# Patient Record
Sex: FEMALE | Race: BLACK OR AFRICAN AMERICAN | NOT HISPANIC OR LATINO | Employment: PART TIME | URBAN - METROPOLITAN AREA
[De-identification: names, ages, dates, MRNs, and addresses within clinical notes are randomized per-mention and may not be internally consistent; named-entity substitution may affect disease eponyms.]

---

## 2017-02-03 ENCOUNTER — HISTORICAL (OUTPATIENT)
Dept: INTERNAL MEDICINE | Facility: CLINIC | Age: 57
End: 2017-02-03

## 2017-02-13 ENCOUNTER — HISTORICAL (OUTPATIENT)
Dept: CARDIOLOGY | Facility: HOSPITAL | Age: 57
End: 2017-02-13

## 2017-02-14 ENCOUNTER — HISTORICAL (OUTPATIENT)
Dept: RADIOLOGY | Facility: HOSPITAL | Age: 57
End: 2017-02-14

## 2017-04-17 ENCOUNTER — HISTORICAL (OUTPATIENT)
Dept: INTERNAL MEDICINE | Facility: CLINIC | Age: 57
End: 2017-04-17

## 2017-12-01 ENCOUNTER — HISTORICAL (OUTPATIENT)
Dept: LAB | Facility: HOSPITAL | Age: 57
End: 2017-12-01

## 2017-12-01 LAB
ABS NEUT (OLG): 1.43 X10(3)/MCL (ref 2.1–9.2)
ALBUMIN SERPL-MCNC: 4 GM/DL (ref 3.4–5)
ALBUMIN/GLOB SERPL: 1 RATIO (ref 1–2)
ALP SERPL-CCNC: 77 UNIT/L (ref 45–117)
ALT SERPL-CCNC: 32 UNIT/L (ref 12–78)
APPEARANCE, UA: CLEAR
AST SERPL-CCNC: 31 UNIT/L (ref 15–37)
BACTERIA #/AREA URNS AUTO: ABNORMAL /[HPF]
BASOPHILS # BLD AUTO: 0.02 X10(3)/MCL
BASOPHILS NFR BLD AUTO: 1 % (ref 0–1)
BILIRUB SERPL-MCNC: 0.3 MG/DL (ref 0.2–1)
BILIRUB UR QL STRIP: NEGATIVE
BILIRUBIN DIRECT+TOT PNL SERPL-MCNC: 0.1 MG/DL
BILIRUBIN DIRECT+TOT PNL SERPL-MCNC: 0.2 MG/DL
BUN SERPL-MCNC: 18 MG/DL (ref 7–18)
CALCIUM SERPL-MCNC: 8.7 MG/DL (ref 8.5–10.1)
CHLORIDE SERPL-SCNC: 106 MMOL/L (ref 98–107)
CHOLEST SERPL-MCNC: 216 MG/DL
CHOLEST/HDLC SERPL: 2 {RATIO} (ref 0–4.4)
CO2 SERPL-SCNC: 26 MMOL/L (ref 21–32)
COLOR UR: ABNORMAL
CREAT SERPL-MCNC: 0.6 MG/DL (ref 0.6–1.3)
EOSINOPHIL # BLD AUTO: 0.1 X10(3)/MCL
EOSINOPHIL NFR BLD AUTO: 3 % (ref 0–5)
ERYTHROCYTE [DISTWIDTH] IN BLOOD BY AUTOMATED COUNT: 13.4 % (ref 11.5–14.5)
EST. AVERAGE GLUCOSE BLD GHB EST-MCNC: 114 MG/DL
GLOBULIN SER-MCNC: 3.8 GM/ML (ref 2.3–3.5)
GLUCOSE (UA): NORMAL
GLUCOSE SERPL-MCNC: 76 MG/DL (ref 74–106)
HAV IGM SERPL QL IA: NONREACTIVE
HBA1C MFR BLD: 5.6 % (ref 4.2–6.3)
HBV CORE IGM SERPL QL IA: NONREACTIVE
HBV SURFACE AG SERPL QL IA: NEGATIVE
HCT VFR BLD AUTO: 34.9 % (ref 35–46)
HCV AB SERPL QL IA: NONREACTIVE
HDLC SERPL-MCNC: 108 MG/DL
HGB BLD-MCNC: 11.4 GM/DL (ref 12–16)
HGB UR QL STRIP: 0.03 MG/DL
HIV 1+2 AB+HIV1 P24 AG SERPL QL IA: NONREACTIVE
HYALINE CASTS #/AREA URNS LPF: ABNORMAL /[LPF]
KETONES UR QL STRIP: NEGATIVE
LDLC SERPL CALC-MCNC: 102 MG/DL (ref 0–130)
LEUKOCYTE ESTERASE UR QL STRIP: NEGATIVE
LYMPHOCYTES # BLD AUTO: 1.04 X10(3)/MCL
LYMPHOCYTES NFR BLD AUTO: 36 % (ref 15–40)
MCH RBC QN AUTO: 29.3 PG (ref 26–34)
MCHC RBC AUTO-ENTMCNC: 32.7 GM/DL (ref 31–37)
MCV RBC AUTO: 89.7 FL (ref 80–100)
MONOCYTES # BLD AUTO: 0.34 X10(3)/MCL
MONOCYTES NFR BLD AUTO: 12 % (ref 4–12)
MUCOUS THREADS URNS QL MICRO: SLIGHT
NEUTROPHILS # BLD AUTO: 1.43 X10(3)/MCL
NEUTROPHILS NFR BLD AUTO: 49 X10(3)/MCL
NITRITE UR QL STRIP: NEGATIVE
PH UR STRIP: 6.5 [PH] (ref 4.5–8)
PLATELET # BLD AUTO: 227 X10(3)/MCL (ref 130–400)
PMV BLD AUTO: 9.4 FL (ref 7.4–10.4)
POTASSIUM SERPL-SCNC: 3.5 MMOL/L (ref 3.5–5.1)
PROT SERPL-MCNC: 7.8 GM/DL (ref 6.4–8.2)
PROT UR QL STRIP: NEGATIVE
RBC # BLD AUTO: 3.89 X10(6)/MCL (ref 4–5.2)
RBC #/AREA URNS AUTO: ABNORMAL /[HPF]
SODIUM SERPL-SCNC: 142 MMOL/L (ref 136–145)
SP GR UR STRIP: 1.02 (ref 1–1.03)
SQUAMOUS #/AREA URNS LPF: >100 /[LPF]
T4 FREE SERPL-MCNC: 0.88 NG/DL (ref 0.76–1.46)
TRIGL SERPL-MCNC: 28 MG/DL
TSH SERPL-ACNC: 3.88 MIU/L (ref 0.36–3.74)
UROBILINOGEN UR STRIP-ACNC: NORMAL MG/DL
VLDLC SERPL CALC-MCNC: 6 MG/DL
WBC # SPEC AUTO: 2.9 X10(3)/MCL (ref 4.5–11)
WBC #/AREA URNS AUTO: ABNORMAL /HPF

## 2017-12-05 ENCOUNTER — HISTORICAL (OUTPATIENT)
Dept: INTERNAL MEDICINE | Facility: CLINIC | Age: 57
End: 2017-12-05

## 2017-12-05 LAB
ABS NEUT (OLG): 1.94 X10(3)/MCL (ref 2.1–9.2)
BASOPHILS # BLD AUTO: 0.02 X10(3)/MCL
BASOPHILS NFR BLD AUTO: 1 % (ref 0–1)
EOSINOPHIL # BLD AUTO: 0.1 X10(3)/MCL
EOSINOPHIL NFR BLD AUTO: 3 % (ref 0–5)
ERYTHROCYTE [DISTWIDTH] IN BLOOD BY AUTOMATED COUNT: 13.6 % (ref 11.5–14.5)
ERYTHROCYTE [SEDIMENTATION RATE] IN BLOOD: 14 MM/HR (ref 0–20)
FERRITIN SERPL-MCNC: 108.3 NG/ML (ref 8–388)
HCT VFR BLD AUTO: 34.7 % (ref 35–46)
HGB BLD-MCNC: 11.2 GM/DL (ref 12–16)
IRON SATN MFR SERPL: 25.4 % (ref 15–50)
IRON SERPL-MCNC: 91 MCG/DL (ref 50–170)
LYMPHOCYTES # BLD AUTO: 0.81 X10(3)/MCL
LYMPHOCYTES NFR BLD AUTO: 26 % (ref 15–40)
MCH RBC QN AUTO: 29 PG (ref 26–34)
MCHC RBC AUTO-ENTMCNC: 32.3 GM/DL (ref 31–37)
MCV RBC AUTO: 89.9 FL (ref 80–100)
MONOCYTES # BLD AUTO: 0.26 X10(3)/MCL
MONOCYTES NFR BLD AUTO: 8 % (ref 4–12)
NEUTROPHILS # BLD AUTO: 1.94 X10(3)/MCL
NEUTROPHILS NFR BLD AUTO: 62 X10(3)/MCL
PLATELET # BLD AUTO: 195 X10(3)/MCL (ref 130–400)
PMV BLD AUTO: 9.2 FL (ref 7.4–10.4)
PROT SERPL-MCNC: 6.8 GM/DL
RBC # BLD AUTO: 3.86 X10(6)/MCL (ref 4–5.2)
TIBC SERPL-MCNC: 358 MCG/DL (ref 250–450)
TRANSFERRIN SERPL-MCNC: 235 MG/DL (ref 200–360)
WBC # SPEC AUTO: 3.1 X10(3)/MCL (ref 4.5–11)

## 2018-04-18 ENCOUNTER — HISTORICAL (OUTPATIENT)
Dept: RADIOLOGY | Facility: HOSPITAL | Age: 58
End: 2018-04-18

## 2018-05-23 ENCOUNTER — HISTORICAL (OUTPATIENT)
Dept: ADMINISTRATIVE | Facility: HOSPITAL | Age: 58
End: 2018-05-23

## 2018-05-23 LAB
ALBUMIN SERPL-MCNC: 3.8 GM/DL (ref 3.4–5)
ALBUMIN/GLOB SERPL: 1 RATIO (ref 1–2)
ALP SERPL-CCNC: 72 UNIT/L (ref 45–117)
ALT SERPL-CCNC: 35 UNIT/L (ref 12–78)
AST SERPL-CCNC: 40 UNIT/L (ref 15–37)
BILIRUB SERPL-MCNC: 0.4 MG/DL (ref 0.2–1)
BUN SERPL-MCNC: 15 MG/DL (ref 7–18)
CALCIUM SERPL-MCNC: 9 MG/DL (ref 8.5–10.1)
CHLORIDE SERPL-SCNC: 106 MMOL/L (ref 98–107)
CO2 SERPL-SCNC: 29 MMOL/L (ref 21–32)
CREAT SERPL-MCNC: 0.7 MG/DL (ref 0.6–1.3)
GLOBULIN SER-MCNC: 3.8 GM/ML (ref 2.3–3.5)
GLUCOSE SERPL-MCNC: 84 MG/DL (ref 74–106)
HIV 1+2 AB+HIV1 P24 AG SERPL QL IA: NONREACTIVE
POTASSIUM SERPL-SCNC: 5.2 MMOL/L (ref 3.5–5.1)
PROT SERPL-MCNC: 7.6 GM/DL (ref 6.4–8.2)
SODIUM SERPL-SCNC: 137 MMOL/L (ref 136–145)
TSH SERPL-ACNC: 2.7 MIU/L (ref 0.36–3.74)

## 2018-08-21 ENCOUNTER — HISTORICAL (OUTPATIENT)
Dept: RADIOLOGY | Facility: HOSPITAL | Age: 58
End: 2018-08-21

## 2018-08-21 LAB
BUN SERPL-MCNC: 17 MG/DL (ref 7–18)
CALCIUM SERPL-MCNC: 8.4 MG/DL (ref 8.5–10.1)
CHLORIDE SERPL-SCNC: 108 MMOL/L (ref 98–107)
CO2 SERPL-SCNC: 27 MMOL/L (ref 21–32)
CREAT SERPL-MCNC: 0.7 MG/DL (ref 0.6–1.3)
CREAT/UREA NIT SERPL: 24
GLUCOSE SERPL-MCNC: 78 MG/DL (ref 74–106)
POTASSIUM SERPL-SCNC: 5.1 MMOL/L (ref 3.5–5.1)
SODIUM SERPL-SCNC: 139 MMOL/L (ref 136–145)

## 2018-08-30 ENCOUNTER — HISTORICAL (OUTPATIENT)
Dept: RADIOLOGY | Facility: HOSPITAL | Age: 58
End: 2018-08-30

## 2019-02-26 ENCOUNTER — HISTORICAL (OUTPATIENT)
Dept: ADMINISTRATIVE | Facility: HOSPITAL | Age: 59
End: 2019-02-26

## 2019-02-26 LAB
ABS NEUT (OLG): 1.76 X10(3)/MCL (ref 2.1–9.2)
ALBUMIN SERPL-MCNC: 4 GM/DL (ref 3.4–5)
ALBUMIN/GLOB SERPL: 1 RATIO (ref 1.1–2)
ALP SERPL-CCNC: 76 UNIT/L (ref 45–117)
ALT SERPL-CCNC: 32 UNIT/L (ref 12–78)
APPEARANCE, UA: CLEAR
AST SERPL-CCNC: 32 UNIT/L (ref 15–37)
BACTERIA #/AREA URNS AUTO: ABNORMAL /[HPF]
BASOPHILS # BLD AUTO: 0.03 X10(3)/MCL
BASOPHILS NFR BLD AUTO: 1 %
BILIRUB SERPL-MCNC: 0.3 MG/DL (ref 0.2–1)
BILIRUB UR QL STRIP: NEGATIVE
BILIRUBIN DIRECT+TOT PNL SERPL-MCNC: 0.1 MG/DL
BILIRUBIN DIRECT+TOT PNL SERPL-MCNC: 0.2 MG/DL
BUN SERPL-MCNC: 13 MG/DL (ref 7–18)
CALCIUM SERPL-MCNC: 9 MG/DL (ref 8.5–10.1)
CHLORIDE SERPL-SCNC: 105 MMOL/L (ref 98–107)
CHOLEST SERPL-MCNC: 203 MG/DL
CHOLEST/HDLC SERPL: 2.1 {RATIO} (ref 0–4.4)
CO2 SERPL-SCNC: 30 MMOL/L (ref 21–32)
COLOR UR: YELLOW
CREAT SERPL-MCNC: 0.6 MG/DL (ref 0.6–1.3)
EOSINOPHIL # BLD AUTO: 0.08 10*3/UL
EOSINOPHIL NFR BLD AUTO: 3 %
ERYTHROCYTE [DISTWIDTH] IN BLOOD BY AUTOMATED COUNT: 13 % (ref 11.5–14.5)
EST. AVERAGE GLUCOSE BLD GHB EST-MCNC: 120 MG/DL
GLOBULIN SER-MCNC: 3.9 GM/ML (ref 2.3–3.5)
GLUCOSE (UA): NORMAL
GLUCOSE SERPL-MCNC: 87 MG/DL (ref 74–106)
HBA1C MFR BLD: 5.8 % (ref 4.2–6.3)
HCT VFR BLD AUTO: 36.4 % (ref 35–46)
HDLC SERPL-MCNC: 99 MG/DL
HGB BLD-MCNC: 11.7 GM/DL (ref 12–16)
HGB UR QL STRIP: 0.06 MG/DL
HYALINE CASTS #/AREA URNS LPF: ABNORMAL /[LPF]
KETONES UR QL STRIP: NEGATIVE
LDLC SERPL CALC-MCNC: 94 MG/DL (ref 0–130)
LEUKOCYTE ESTERASE UR QL STRIP: 25 LEU/UL
LYMPHOCYTES # BLD AUTO: 0.86 X10(3)/MCL
LYMPHOCYTES NFR BLD AUTO: 28 % (ref 13–40)
MCH RBC QN AUTO: 29 PG (ref 26–34)
MCHC RBC AUTO-ENTMCNC: 32.1 GM/DL (ref 31–37)
MCV RBC AUTO: 90.3 FL (ref 80–100)
MONOCYTES # BLD AUTO: 0.3 X10(3)/MCL
MONOCYTES NFR BLD AUTO: 10 % (ref 4–12)
NEUTROPHILS # BLD AUTO: 1.76 X10(3)/MCL
NEUTROPHILS NFR BLD AUTO: 58 X10(3)/MCL
NITRITE UR QL STRIP: NEGATIVE
PH UR STRIP: 5.5 [PH] (ref 4.5–8)
PLATELET # BLD AUTO: 242 X10(3)/MCL (ref 130–400)
PMV BLD AUTO: 8.6 FL (ref 7.4–10.4)
POTASSIUM SERPL-SCNC: 4.3 MMOL/L (ref 3.5–5.1)
PROT SERPL-MCNC: 7.9 GM/DL (ref 6.4–8.2)
PROT UR QL STRIP: NEGATIVE
RBC # BLD AUTO: 4.03 X10(6)/MCL (ref 4–5.2)
RBC #/AREA URNS AUTO: ABNORMAL /[HPF]
SODIUM SERPL-SCNC: 139 MMOL/L (ref 136–145)
SP GR UR STRIP: 1.02 (ref 1–1.03)
SQUAMOUS #/AREA URNS LPF: >100 /[LPF]
T4 FREE SERPL-MCNC: 0.98 NG/DL (ref 0.76–1.46)
TRIGL SERPL-MCNC: 49 MG/DL
TSH SERPL-ACNC: 1.57 MIU/L (ref 0.36–3.74)
UROBILINOGEN UR STRIP-ACNC: NORMAL
VLDLC SERPL CALC-MCNC: 10 MG/DL
WBC # SPEC AUTO: 3 X10(3)/MCL (ref 4.5–11)
WBC #/AREA URNS AUTO: ABNORMAL /HPF

## 2019-04-23 ENCOUNTER — HISTORICAL (OUTPATIENT)
Dept: WOUND CARE | Facility: HOSPITAL | Age: 59
End: 2019-04-23

## 2020-01-28 ENCOUNTER — HISTORICAL (OUTPATIENT)
Dept: ENDOSCOPY | Facility: HOSPITAL | Age: 60
End: 2020-01-28

## 2020-06-04 ENCOUNTER — HISTORICAL (OUTPATIENT)
Dept: LAB | Facility: HOSPITAL | Age: 60
End: 2020-06-04

## 2020-06-04 LAB
BUN SERPL-MCNC: 12 MG/DL (ref 7–18)
CALCIUM SERPL-MCNC: 9.1 MG/DL (ref 8.5–10.1)
CHLORIDE SERPL-SCNC: 109 MMOL/L (ref 98–107)
CO2 SERPL-SCNC: 27 MMOL/L (ref 21–32)
CREAT SERPL-MCNC: 0.7 MG/DL (ref 0.6–1.3)
CREAT/UREA NIT SERPL: 17
GLUCOSE SERPL-MCNC: 84 MG/DL (ref 74–106)
POTASSIUM SERPL-SCNC: 4.1 MMOL/L (ref 3.5–5.1)
SODIUM SERPL-SCNC: 141 MMOL/L (ref 136–145)
T4 FREE SERPL-MCNC: 0.9 NG/DL (ref 0.76–1.46)
TSH SERPL-ACNC: 5.89 MIU/L (ref 0.36–3.74)

## 2020-07-13 ENCOUNTER — HISTORICAL (OUTPATIENT)
Dept: RADIOLOGY | Facility: HOSPITAL | Age: 60
End: 2020-07-13

## 2020-07-13 LAB
ABS NEUT (OLG): 1.52 X10(3)/MCL (ref 2.1–9.2)
ALBUMIN SERPL-MCNC: 3.8 GM/DL (ref 3.4–5)
ALBUMIN/GLOB SERPL: 1 RATIO (ref 1.1–2)
ALP SERPL-CCNC: 82 UNIT/L (ref 45–117)
ALT SERPL-CCNC: 22 UNIT/L (ref 12–78)
APPEARANCE, UA: CLEAR
AST SERPL-CCNC: 21 UNIT/L (ref 15–37)
BACTERIA #/AREA URNS AUTO: ABNORMAL /HPF
BASOPHILS # BLD AUTO: 0 X10(3)/MCL (ref 0–0.2)
BASOPHILS NFR BLD AUTO: 1 %
BILIRUB SERPL-MCNC: 0.5 MG/DL (ref 0.2–1)
BILIRUB UR QL STRIP: NEGATIVE
BILIRUBIN DIRECT+TOT PNL SERPL-MCNC: 0.1 MG/DL (ref 0–0.2)
BILIRUBIN DIRECT+TOT PNL SERPL-MCNC: 0.4 MG/DL
BUN SERPL-MCNC: 13 MG/DL (ref 7–18)
CALCIUM SERPL-MCNC: 8.8 MG/DL (ref 8.5–10.1)
CHLORIDE SERPL-SCNC: 108 MMOL/L (ref 98–107)
CHOLEST SERPL-MCNC: 211 MG/DL
CHOLEST/HDLC SERPL: 2.5 {RATIO} (ref 0–4.4)
CO2 SERPL-SCNC: 28 MMOL/L (ref 21–32)
COLOR UR: ABNORMAL
CREAT SERPL-MCNC: 0.7 MG/DL (ref 0.6–1.3)
EOSINOPHIL # BLD AUTO: 0.1 X10(3)/MCL (ref 0–0.9)
EOSINOPHIL NFR BLD AUTO: 4 %
ERYTHROCYTE [DISTWIDTH] IN BLOOD BY AUTOMATED COUNT: 13.2 % (ref 11.5–14.5)
EST. AVERAGE GLUCOSE BLD GHB EST-MCNC: 123 MG/DL
GLOBULIN SER-MCNC: 3.8 GM/ML (ref 2.3–3.5)
GLUCOSE (UA): NEGATIVE
GLUCOSE SERPL-MCNC: 82 MG/DL (ref 74–106)
HBA1C MFR BLD: 5.9 % (ref 4.2–6.3)
HCT VFR BLD AUTO: 35 % (ref 35–46)
HDLC SERPL-MCNC: 85 MG/DL (ref 40–59)
HGB BLD-MCNC: 11.3 GM/DL (ref 12–16)
HGB UR QL STRIP: 0.06 MG/DL
HIV 1+2 AB+HIV1 P24 AG SERPL QL IA: NONREACTIVE
HYALINE CASTS #/AREA URNS LPF: ABNORMAL /LPF
KETONES UR QL STRIP: NEGATIVE
LDLC SERPL CALC-MCNC: 118 MG/DL
LEUKOCYTE ESTERASE UR QL STRIP: NEGATIVE
LYMPHOCYTES # BLD AUTO: 1.3 X10(3)/MCL (ref 0.6–4.6)
LYMPHOCYTES NFR BLD AUTO: 40 %
MCH RBC QN AUTO: 29 PG (ref 26–34)
MCHC RBC AUTO-ENTMCNC: 32.3 GM/DL (ref 31–37)
MCV RBC AUTO: 89.7 FL (ref 80–100)
MONOCYTES # BLD AUTO: 0.3 X10(3)/MCL (ref 0.1–1.3)
MONOCYTES NFR BLD AUTO: 10 %
NEUTROPHILS # BLD AUTO: 1.52 X10(3)/MCL (ref 2.1–9.2)
NEUTROPHILS NFR BLD AUTO: 46 %
NITRITE UR QL STRIP: NEGATIVE
PH UR STRIP: 5.5 [PH] (ref 4.5–8)
PLATELET # BLD AUTO: 237 X10(3)/MCL (ref 130–400)
PMV BLD AUTO: 9 FL (ref 7.4–10.4)
POTASSIUM SERPL-SCNC: 3.9 MMOL/L (ref 3.5–5.1)
PROT SERPL-MCNC: 7.6 GM/DL (ref 6.4–8.2)
PROT UR QL STRIP: NEGATIVE
RBC # BLD AUTO: 3.9 X10(6)/MCL (ref 4–5.2)
RBC #/AREA URNS AUTO: ABNORMAL /HPF
SODIUM SERPL-SCNC: 140 MMOL/L (ref 136–145)
SP GR UR STRIP: 1.01 (ref 1–1.03)
SQUAMOUS #/AREA URNS LPF: ABNORMAL /LPF
T4 FREE SERPL-MCNC: 0.91 NG/DL (ref 0.76–1.46)
TRIGL SERPL-MCNC: 42 MG/DL
TSH SERPL-ACNC: 4.47 MIU/L (ref 0.36–3.74)
UROBILINOGEN UR STRIP-ACNC: NORMAL
VLDLC SERPL CALC-MCNC: 8 MG/DL
WBC # SPEC AUTO: 3.3 X10(3)/MCL (ref 4.5–11)
WBC #/AREA URNS AUTO: ABNORMAL /HPF

## 2020-11-06 ENCOUNTER — HISTORICAL (OUTPATIENT)
Dept: RADIOLOGY | Facility: HOSPITAL | Age: 60
End: 2020-11-06

## 2020-12-28 ENCOUNTER — HISTORICAL (OUTPATIENT)
Dept: INTERNAL MEDICINE | Facility: CLINIC | Age: 60
End: 2020-12-28

## 2020-12-28 LAB
ABS NEUT (OLG): 1.31 X10(3)/MCL (ref 2.1–9.2)
ALBUMIN SERPL-MCNC: 4.3 GM/DL (ref 3.4–4.8)
ALBUMIN/GLOB SERPL: 1.1 RATIO (ref 1.1–2)
ALP SERPL-CCNC: 66 UNIT/L (ref 40–150)
ALT SERPL-CCNC: 13 UNIT/L (ref 0–55)
AST SERPL-CCNC: 18 UNIT/L (ref 5–34)
BASOPHILS # BLD AUTO: 0 X10(3)/MCL (ref 0–0.2)
BASOPHILS NFR BLD AUTO: 1 %
BILIRUB SERPL-MCNC: 0.4 MG/DL
BILIRUBIN DIRECT+TOT PNL SERPL-MCNC: 0.2 MG/DL (ref 0–0.5)
BILIRUBIN DIRECT+TOT PNL SERPL-MCNC: 0.2 MG/DL (ref 0–0.8)
BUN SERPL-MCNC: 12 MG/DL (ref 9.8–20.1)
CALCIUM SERPL-MCNC: 9.6 MG/DL (ref 8.4–10.2)
CHLORIDE SERPL-SCNC: 106 MMOL/L (ref 98–107)
CO2 SERPL-SCNC: 24 MMOL/L (ref 23–31)
CREAT SERPL-MCNC: 0.68 MG/DL (ref 0.55–1.02)
EOSINOPHIL # BLD AUTO: 0.1 X10(3)/MCL (ref 0–0.9)
EOSINOPHIL NFR BLD AUTO: 4 %
ERYTHROCYTE [DISTWIDTH] IN BLOOD BY AUTOMATED COUNT: 14.1 % (ref 11.5–14.5)
GLOBULIN SER-MCNC: 3.8 GM/DL (ref 2.4–3.5)
GLUCOSE SERPL-MCNC: 81 MG/DL (ref 82–115)
HCT VFR BLD AUTO: 37.1 % (ref 35–46)
HGB BLD-MCNC: 11.7 GM/DL (ref 12–16)
LYMPHOCYTES # BLD AUTO: 1.1 X10(3)/MCL (ref 0.6–4.6)
LYMPHOCYTES NFR BLD AUTO: 39 %
MCH RBC QN AUTO: 29 PG (ref 26–34)
MCHC RBC AUTO-ENTMCNC: 31.5 GM/DL (ref 31–37)
MCV RBC AUTO: 91.8 FL (ref 80–100)
MONOCYTES # BLD AUTO: 0.3 X10(3)/MCL (ref 0.1–1.3)
MONOCYTES NFR BLD AUTO: 11 %
NEUTROPHILS # BLD AUTO: 1.31 X10(3)/MCL (ref 2.1–9.2)
NEUTROPHILS NFR BLD AUTO: 46 %
PLATELET # BLD AUTO: 211 X10(3)/MCL (ref 130–400)
PMV BLD AUTO: 9.1 FL (ref 7.4–10.4)
POTASSIUM SERPL-SCNC: 4.1 MMOL/L (ref 3.5–5.1)
PROT SERPL-MCNC: 8.1 GM/DL (ref 5.8–7.6)
RBC # BLD AUTO: 4.04 X10(6)/MCL (ref 4–5.2)
SODIUM SERPL-SCNC: 141 MMOL/L (ref 136–145)
T4 FREE SERPL-MCNC: 1.21 NG/DL (ref 0.7–1.48)
TSH SERPL-ACNC: 0.08 UIU/ML (ref 0.35–4.94)
WBC # SPEC AUTO: 2.9 X10(3)/MCL (ref 4.5–11)

## 2021-06-25 ENCOUNTER — HISTORICAL (OUTPATIENT)
Dept: INTERNAL MEDICINE | Facility: CLINIC | Age: 61
End: 2021-06-25

## 2021-06-25 LAB
ABS NEUT (OLG): 2.18 X10(3)/MCL (ref 2.1–9.2)
ALBUMIN SERPL-MCNC: 3.9 GM/DL (ref 3.4–4.8)
ALBUMIN/GLOB SERPL: 1.1 RATIO (ref 1.1–2)
ALP SERPL-CCNC: 75 UNIT/L (ref 40–150)
ALT SERPL-CCNC: 21 UNIT/L (ref 0–55)
APPEARANCE, UA: ABNORMAL
AST SERPL-CCNC: 34 UNIT/L (ref 5–34)
BACTERIA #/AREA URNS AUTO: ABNORMAL /HPF
BASOPHILS # BLD AUTO: 0 X10(3)/MCL (ref 0–0.2)
BASOPHILS NFR BLD AUTO: 0 %
BILIRUB SERPL-MCNC: 0.2 MG/DL
BILIRUB UR QL STRIP: NEGATIVE
BILIRUBIN DIRECT+TOT PNL SERPL-MCNC: <0.1 MG/DL (ref 0–0.5)
BILIRUBIN DIRECT+TOT PNL SERPL-MCNC: >0.1 MG/DL (ref 0–0.8)
BUN SERPL-MCNC: 10.6 MG/DL (ref 9.8–20.1)
CALCIUM SERPL-MCNC: 9.5 MG/DL (ref 8.4–10.2)
CHLORIDE SERPL-SCNC: 106 MMOL/L (ref 98–107)
CHOLEST SERPL-MCNC: 229 MG/DL
CHOLEST/HDLC SERPL: 3 {RATIO} (ref 0–5)
CO2 SERPL-SCNC: 26 MMOL/L (ref 23–31)
COLOR UR: YELLOW
CREAT SERPL-MCNC: 0.55 MG/DL (ref 0.55–1.02)
DEPRECATED CALCIDIOL+CALCIFEROL SERPL-MC: 28.2 NG/ML (ref 30–80)
EOSINOPHIL # BLD AUTO: 0.1 X10(3)/MCL (ref 0–0.9)
EOSINOPHIL NFR BLD AUTO: 3 %
ERYTHROCYTE [DISTWIDTH] IN BLOOD BY AUTOMATED COUNT: 14.1 % (ref 11.5–14.5)
EST. AVERAGE GLUCOSE BLD GHB EST-MCNC: 114 MG/DL
GLOBULIN SER-MCNC: 3.6 GM/DL (ref 2.4–3.5)
GLUCOSE (UA): NEGATIVE
GLUCOSE SERPL-MCNC: 84 MG/DL (ref 82–115)
HBA1C MFR BLD: 5.6 %
HCT VFR BLD AUTO: 35.5 % (ref 35–46)
HDLC SERPL-MCNC: 77 MG/DL (ref 35–60)
HGB BLD-MCNC: 11.3 GM/DL (ref 12–16)
HGB UR QL STRIP: 0.1
HYALINE CASTS #/AREA URNS LPF: ABNORMAL /LPF
IMM GRANULOCYTES # BLD AUTO: 0.01 10*3/UL
IMM GRANULOCYTES NFR BLD AUTO: 0 %
KETONES UR QL STRIP: NEGATIVE
LDLC SERPL CALC-MCNC: 143 MG/DL (ref 50–140)
LEUKOCYTE ESTERASE UR QL STRIP: 250 LEU/UL
LYMPHOCYTES # BLD AUTO: 1.1 X10(3)/MCL (ref 0.6–4.6)
LYMPHOCYTES NFR BLD AUTO: 28 %
MCH RBC QN AUTO: 28.7 PG (ref 26–34)
MCHC RBC AUTO-ENTMCNC: 31.8 GM/DL (ref 31–37)
MCV RBC AUTO: 90.1 FL (ref 80–100)
MONOCYTES # BLD AUTO: 0.4 X10(3)/MCL (ref 0.1–1.3)
MONOCYTES NFR BLD AUTO: 10 %
MUCOUS THREADS URNS QL MICRO: ABNORMAL
NEUTROPHILS # BLD AUTO: 2.18 X10(3)/MCL (ref 2.1–9.2)
NEUTROPHILS NFR BLD AUTO: 58 %
NITRITE UR QL STRIP: NEGATIVE
NRBC BLD AUTO-RTO: 0 % (ref 0–0.2)
PH UR STRIP: 5.5 [PH] (ref 4.5–8)
PLATELET # BLD AUTO: 227 X10(3)/MCL (ref 130–400)
PMV BLD AUTO: 8.9 FL (ref 7.4–10.4)
POTASSIUM SERPL-SCNC: 4.1 MMOL/L (ref 3.5–5.1)
PROT SERPL-MCNC: 7.5 GM/DL (ref 5.8–7.6)
PROT UR QL STRIP: 10 MG/DL
RBC # BLD AUTO: 3.94 X10(6)/MCL (ref 4–5.2)
RBC #/AREA URNS AUTO: ABNORMAL /HPF
SODIUM SERPL-SCNC: 140 MMOL/L (ref 136–145)
SP GR UR STRIP: 1.02 (ref 1–1.03)
SQUAMOUS #/AREA URNS LPF: >100 /LPF
TRIGL SERPL-MCNC: 44 MG/DL (ref 37–140)
TSH SERPL-ACNC: 26.24 UIU/ML (ref 0.35–4.94)
UROBILINOGEN UR STRIP-ACNC: NORMAL
VLDLC SERPL CALC-MCNC: 9 MG/DL
WBC # SPEC AUTO: 3.8 X10(3)/MCL (ref 4.5–11)
WBC #/AREA URNS AUTO: ABNORMAL /HPF

## 2021-06-27 LAB — FINAL CULTURE: NORMAL

## 2021-07-19 ENCOUNTER — HISTORICAL (OUTPATIENT)
Dept: RADIOLOGY | Facility: HOSPITAL | Age: 61
End: 2021-07-19

## 2021-12-03 ENCOUNTER — HISTORICAL (OUTPATIENT)
Dept: INTERNAL MEDICINE | Facility: CLINIC | Age: 61
End: 2021-12-03

## 2021-12-03 LAB
ABS NEUT (OLG): 2.06 X10(3)/MCL (ref 2.1–9.2)
ALBUMIN SERPL-MCNC: 3.9 GM/DL (ref 3.4–4.8)
ALBUMIN/GLOB SERPL: 1.1 RATIO (ref 1.1–2)
ALP SERPL-CCNC: 73 UNIT/L (ref 40–150)
ALT SERPL-CCNC: 13 UNIT/L (ref 0–55)
AST SERPL-CCNC: 21 UNIT/L (ref 5–34)
BASOPHILS # BLD AUTO: 0 X10(3)/MCL (ref 0–0.2)
BASOPHILS NFR BLD AUTO: 0 %
BILIRUB SERPL-MCNC: 0.4 MG/DL
BILIRUBIN DIRECT+TOT PNL SERPL-MCNC: 0.2 MG/DL (ref 0–0.5)
BILIRUBIN DIRECT+TOT PNL SERPL-MCNC: 0.2 MG/DL (ref 0–0.8)
BUN SERPL-MCNC: 15.7 MG/DL (ref 9.8–20.1)
CALCIUM SERPL-MCNC: 9.4 MG/DL (ref 8.7–10.5)
CHLORIDE SERPL-SCNC: 108 MMOL/L (ref 98–107)
CHOLEST SERPL-MCNC: 209 MG/DL
CHOLEST/HDLC SERPL: 3 {RATIO} (ref 0–5)
CO2 SERPL-SCNC: 25 MMOL/L (ref 23–31)
CREAT SERPL-MCNC: 0.71 MG/DL (ref 0.55–1.02)
EOSINOPHIL # BLD AUTO: 0.1 X10(3)/MCL (ref 0–0.9)
EOSINOPHIL NFR BLD AUTO: 2 %
ERYTHROCYTE [DISTWIDTH] IN BLOOD BY AUTOMATED COUNT: 13.1 % (ref 11.5–14.5)
GLOBULIN SER-MCNC: 3.7 GM/DL (ref 2.4–3.5)
GLUCOSE SERPL-MCNC: 82 MG/DL (ref 82–115)
HCT VFR BLD AUTO: 36.4 % (ref 35–46)
HDLC SERPL-MCNC: 70 MG/DL (ref 35–60)
HGB BLD-MCNC: 11.8 GM/DL (ref 12–16)
IMM GRANULOCYTES # BLD AUTO: 0.01 10*3/UL
IMM GRANULOCYTES NFR BLD AUTO: 0 %
LDLC SERPL CALC-MCNC: 131 MG/DL (ref 50–140)
LYMPHOCYTES # BLD AUTO: 1.2 X10(3)/MCL (ref 0.6–4.6)
LYMPHOCYTES NFR BLD AUTO: 32 %
MCH RBC QN AUTO: 29.1 PG (ref 26–34)
MCHC RBC AUTO-ENTMCNC: 32.4 GM/DL (ref 31–37)
MCV RBC AUTO: 89.7 FL (ref 80–100)
MONOCYTES # BLD AUTO: 0.4 X10(3)/MCL (ref 0.1–1.3)
MONOCYTES NFR BLD AUTO: 11 %
NEUTROPHILS # BLD AUTO: 2.06 X10(3)/MCL (ref 2.1–9.2)
NEUTROPHILS NFR BLD AUTO: 54 %
NRBC BLD AUTO-RTO: 0 % (ref 0–0.2)
PLATELET # BLD AUTO: 262 X10(3)/MCL (ref 130–400)
PLATELET # BLD EST: ADEQUATE 10*3/UL
PMV BLD AUTO: 8.6 FL (ref 7.4–10.4)
POTASSIUM SERPL-SCNC: 3.8 MMOL/L (ref 3.5–5.1)
PROT SERPL-MCNC: 7.6 GM/DL (ref 5.8–7.6)
RBC # BLD AUTO: 4.06 X10(6)/MCL (ref 4–5.2)
RBC MORPH BLD: NORMAL
SODIUM SERPL-SCNC: 140 MMOL/L (ref 136–145)
TRIGL SERPL-MCNC: 42 MG/DL (ref 37–140)
TSH SERPL-ACNC: 4.56 UIU/ML (ref 0.35–4.94)
VLDLC SERPL CALC-MCNC: 8 MG/DL
WBC # SPEC AUTO: 3.8 X10(3)/MCL (ref 4.5–11)

## 2022-03-11 ENCOUNTER — HISTORICAL (OUTPATIENT)
Dept: RADIOLOGY | Facility: HOSPITAL | Age: 62
End: 2022-03-11

## 2022-03-11 ENCOUNTER — HISTORICAL (OUTPATIENT)
Dept: CARDIOLOGY | Facility: HOSPITAL | Age: 62
End: 2022-03-11

## 2022-03-11 ENCOUNTER — HISTORICAL (OUTPATIENT)
Dept: ADMINISTRATIVE | Facility: HOSPITAL | Age: 62
End: 2022-03-11

## 2022-04-11 ENCOUNTER — HISTORICAL (OUTPATIENT)
Dept: ADMINISTRATIVE | Facility: HOSPITAL | Age: 62
End: 2022-04-11
Payer: MEDICARE

## 2022-04-26 VITALS
DIASTOLIC BLOOD PRESSURE: 68 MMHG | BODY MASS INDEX: 20.98 KG/M2 | OXYGEN SATURATION: 100 % | HEIGHT: 68 IN | SYSTOLIC BLOOD PRESSURE: 128 MMHG | WEIGHT: 138.44 LBS

## 2022-04-30 NOTE — OP NOTE
DATE OF SURGERY:    01/28/2020    SURGEON:  Mervin Goodman MD    attending physician:  Dr. Mervin Goodman MD    PREOPERATIVE DIAGNOSIS:  Microscopic hematuria.    POSTOPERATIVE DIAGNOSIS:  Microscopic hematuria.    PROCEDURE:  Cystoscopy.    COMPLICATIONS:  None.    BLOOD LOSS:  None.    FINDINGS:  Mild trabeculation, essential hematuria.    INDICATIONS:  A 59-year-old female with persistent microscopic hematuria.  Smoked previously.  She comes in today to complete her workup with cystoscopy.  CT scan was unremarkable.    DESCRIPTION OF PROCEDURE:  She was taken the cysto suite, placed in dorsal lithotomy, prepped with Betadine.  Viscous lidocaine instilled into the urethra.  Flexible scope was inserted.  Urethra was unremarkable.  She did have some senile atrophy of the vaginal mucosa.  Looking in the bladder, ureteral orifices were normal size, shape, position, clear efflux bilaterally.  No tumor, stones, foreign bodies seen.  Dome and bladder neck reviewed and free of lesions.  She did have a grade 1 trabeculation, but otherwise unremarkable.    ASSESSMENT:  Essential hematuria.       She will follow up [facility] in 6 months, sooner for problems.      ______________________________  Mervin Goodman MD    TAB/MODL  DD:  01/28/2020  Time:  12:02PM  DT:  01/28/2020  Time:  12:34PM  Job #:  [id number]

## 2022-08-16 ENCOUNTER — LAB VISIT (OUTPATIENT)
Dept: LAB | Facility: HOSPITAL | Age: 62
End: 2022-08-16
Attending: NURSE PRACTITIONER
Payer: MEDICARE

## 2022-08-16 DIAGNOSIS — E55.9 VITAMIN D DEFICIENCY: ICD-10-CM

## 2022-08-16 DIAGNOSIS — I10 HYPERTENSION, UNSPECIFIED TYPE: ICD-10-CM

## 2022-08-16 DIAGNOSIS — E78.00 HIGH CHOLESTEROL: ICD-10-CM

## 2022-08-16 DIAGNOSIS — R00.1 BRADYCARDIA: ICD-10-CM

## 2022-08-16 DIAGNOSIS — D72.819 LEUKOPENIA, UNSPECIFIED TYPE: ICD-10-CM

## 2022-08-16 DIAGNOSIS — Z12.11 SCREENING FOR COLON CANCER: Primary | ICD-10-CM

## 2022-08-16 DIAGNOSIS — E04.1 THYROID NODULE: ICD-10-CM

## 2022-08-16 DIAGNOSIS — E03.9 HYPOTHYROIDISM, UNSPECIFIED TYPE: ICD-10-CM

## 2022-08-16 PROBLEM — Z00.00 WELLNESS EXAMINATION: Status: ACTIVE | Noted: 2022-08-16

## 2022-08-16 PROBLEM — D70.8 OTHER NEUTROPENIA: Status: ACTIVE | Noted: 2022-08-16

## 2022-08-16 PROBLEM — R31.29 MICROSCOPIC HEMATURIA: Status: ACTIVE | Noted: 2022-08-16

## 2022-08-16 LAB
ALBUMIN SERPL-MCNC: 3.7 GM/DL (ref 3.4–4.8)
ALBUMIN/GLOB SERPL: 1.2 RATIO (ref 1.1–2)
ALP SERPL-CCNC: 69 UNIT/L (ref 40–150)
ALT SERPL-CCNC: 22 UNIT/L (ref 0–55)
APPEARANCE UR: CLEAR
AST SERPL-CCNC: 28 UNIT/L (ref 5–34)
BACTERIA #/AREA URNS AUTO: ABNORMAL /HPF
BASOPHILS # BLD AUTO: 0.02 X10(3)/MCL (ref 0–0.2)
BASOPHILS NFR BLD AUTO: 0.6 %
BILIRUB UR QL STRIP.AUTO: NEGATIVE MG/DL
BILIRUBIN DIRECT+TOT PNL SERPL-MCNC: 0.4 MG/DL
BUN SERPL-MCNC: 13.7 MG/DL (ref 9.8–20.1)
CALCIUM SERPL-MCNC: 9.2 MG/DL (ref 8.4–10.2)
CHLORIDE SERPL-SCNC: 109 MMOL/L (ref 98–107)
CHOLEST SERPL-MCNC: 204 MG/DL
CHOLEST/HDLC SERPL: 3 {RATIO} (ref 0–5)
CO2 SERPL-SCNC: 26 MMOL/L (ref 23–31)
COLOR UR AUTO: ABNORMAL
CREAT SERPL-MCNC: 0.72 MG/DL (ref 0.55–1.02)
DEPRECATED CALCIDIOL+CALCIFEROL SERPL-MC: 23.8 NG/ML (ref 30–80)
EOSINOPHIL # BLD AUTO: 0.18 X10(3)/MCL (ref 0–0.9)
EOSINOPHIL NFR BLD AUTO: 5.6 %
ERYTHROCYTE [DISTWIDTH] IN BLOOD BY AUTOMATED COUNT: 13.7 % (ref 11.5–17)
GFR SERPLBLD CREATININE-BSD FMLA CKD-EPI: >60 MLS/MIN/1.73/M2
GLOBULIN SER-MCNC: 3.2 GM/DL (ref 2.4–3.5)
GLUCOSE SERPL-MCNC: 80 MG/DL (ref 82–115)
GLUCOSE UR QL STRIP.AUTO: NORMAL MG/DL
HCT VFR BLD AUTO: 35.7 % (ref 37–47)
HDLC SERPL-MCNC: 71 MG/DL (ref 35–60)
HGB BLD-MCNC: 11.3 GM/DL (ref 12–16)
HYALINE CASTS #/AREA URNS LPF: ABNORMAL /LPF
IMM GRANULOCYTES # BLD AUTO: 0 X10(3)/MCL (ref 0–0.04)
IMM GRANULOCYTES NFR BLD AUTO: 0 %
KETONES UR QL STRIP.AUTO: NEGATIVE MG/DL
LDLC SERPL CALC-MCNC: 124 MG/DL (ref 50–140)
LEUKOCYTE ESTERASE UR QL STRIP.AUTO: NEGATIVE UNIT/L
LYMPHOCYTES # BLD AUTO: 1.02 X10(3)/MCL (ref 0.6–4.6)
LYMPHOCYTES NFR BLD AUTO: 31.8 %
MCH RBC QN AUTO: 28.5 PG (ref 27–31)
MCHC RBC AUTO-ENTMCNC: 31.7 MG/DL (ref 33–36)
MCV RBC AUTO: 89.9 FL (ref 80–94)
MONOCYTES # BLD AUTO: 0.29 X10(3)/MCL (ref 0.1–1.3)
MONOCYTES NFR BLD AUTO: 9 %
MUCOUS THREADS URNS QL MICRO: ABNORMAL /LPF
NEUTROPHILS # BLD AUTO: 1.7 X10(3)/MCL (ref 2.1–9.2)
NEUTROPHILS NFR BLD AUTO: 53 %
NITRITE UR QL STRIP.AUTO: NEGATIVE
NRBC BLD AUTO-RTO: 0 %
PH UR STRIP.AUTO: 6.5 [PH]
PLATELET # BLD AUTO: 216 X10(3)/MCL (ref 130–400)
PMV BLD AUTO: 9.3 FL (ref 7.4–10.4)
POTASSIUM SERPL-SCNC: 3.8 MMOL/L (ref 3.5–5.1)
PROT SERPL-MCNC: 6.9 GM/DL (ref 5.8–7.6)
PROT UR QL STRIP.AUTO: NEGATIVE MG/DL
RBC # BLD AUTO: 3.97 X10(6)/MCL (ref 4.2–5.4)
RBC #/AREA URNS AUTO: ABNORMAL /HPF
RBC UR QL AUTO: ABNORMAL UNIT/L
SODIUM SERPL-SCNC: 142 MMOL/L (ref 136–145)
SP GR UR STRIP.AUTO: 1.02
SQUAMOUS #/AREA URNS LPF: ABNORMAL /HPF
TRIGL SERPL-MCNC: 43 MG/DL (ref 37–140)
TSH SERPL-ACNC: 4.76 UIU/ML (ref 0.35–4.94)
UROBILINOGEN UR STRIP-ACNC: NORMAL MG/DL
VLDLC SERPL CALC-MCNC: 9 MG/DL
WBC # SPEC AUTO: 3.2 X10(3)/MCL (ref 4.5–11.5)
WBC #/AREA URNS AUTO: ABNORMAL /HPF

## 2022-08-16 PROCEDURE — 85025 COMPLETE CBC W/AUTO DIFF WBC: CPT

## 2022-08-16 PROCEDURE — 36415 COLL VENOUS BLD VENIPUNCTURE: CPT

## 2022-08-16 PROCEDURE — 80053 COMPREHEN METABOLIC PANEL: CPT

## 2022-08-16 PROCEDURE — 80061 LIPID PANEL: CPT

## 2022-08-16 PROCEDURE — 82306 VITAMIN D 25 HYDROXY: CPT

## 2022-08-16 PROCEDURE — 84443 ASSAY THYROID STIM HORMONE: CPT

## 2022-08-16 PROCEDURE — 81001 URINALYSIS AUTO W/SCOPE: CPT

## 2022-08-16 NOTE — PROGRESS NOTES
BERTHA Quispe   [facility]  [address] [address]      PATIENT NAME: Julianne Parker  : 1960  DATE: 22  MRN: [id number]      Billing Provider: BERTHA Quispe  Level of Service:   Patient PCP Information     Provider PCP Type    BERTHA Quispe General          Reason for Visit / Chief Complaint: Follow-up (Lab review/)       History of Present Illness / Problem Focused Workflow     Julianne Parker presents to the clinic with Follow-up (Lab review/)     Initial Visit 18: 58 y.o. AAF presenting to the clinic to re-establish primary care. Previous PCP SOLIS Baez NP. Last OV 2018. PMHx significant for HTN, Hypothyroidism, and Sinus Bradycardia. Bp at goal today. Currently taking Amlodipine 10 mg po daily and Lisinopril 5 mg po daily. TSH 2.700 (2018). Currently taking Levothyroxine 25 mcg po daily. Denies s/s of hypo/hyperthyroidism. Seen by Cardio 2018. Work-up mostly negative for bradycardia. Pt remains asymptomatic. She is to f/u with Cardio PRN. Following Urology for microscopic hematuria. Plan is for cystoscopy in the future. CT Abd/Pelvis was unremarkable. Pt presented to ED 2018 w/ c/o right breast lump. Diagnostic MMG completed. Recommendations were to f/u with right breast US. US needed. She was a NS for US in September. She is requesting appt be rescheduled. Pt does states that she can no longer feel the lump. Denies fever, chills, night sweats, CP, SOB, Abd pain, or any other concerns today.    4/3/19: 58 y.o. AAF with PMHx significant for HTN, Hypothyroidism, and Sinus Bradycardia, presenting for f/u. Bp at goal today. Currently taking Amlodipine 10 mg po daily and Lisinopril 5 mg po daily. TSH 1.570 (WNL). Currently taking Levothyroxine 25 mcg po daily. Denies s/s of hypo/hyperthyroidism. Seen by Cardio 19. HR stable. Remains asymptomatic. F/u with Cardio PRN. Pt was  "contacted by GI lab to scheduled cysto in 12/2018 but she never followed-up. Number to lab provided. Pt states that she will call today to try and schedule procedure. Reports that she had a Sleep Study conducted in 2015. Reviewed results with pt which revealed primary snoring. She did not meet criteria for ILENE. Pt replied "I'm good," and expressed that she's not interested in doing any repeat studies at this time as her symptoms have not changed/worsened. MMG scheduled Tuesday, April 23rd, 2019. Reports that she will return FIT as soon as she can. Denies fever, chills, HA, CP, SOB, abd pain, peripheral swelling, B/B dysfunction, or any other concerns today.    (10/3/19): Pt presenting for 6-mth f/u, Hypothyroidism, and Sinus Bradycardia. Bp at goal today. Currently taking Amlodipine 10 mg po daily and Lisinopril 5 mg po daily. TSH 1.570 (WNL) 2/26/19. Currently taking Levothyroxine 25 mcg po daily. Denies s/s of hypo/hyperthyroidism. New TSH level pending. Pt did not complete labs. She is not fasting so she plans to come to lab on Monday. She is amenable to returning FIT (has refused in the past). Doesn't appear that she's called to schedule cysto for hx of MSH. She was a NS for procedure 7/2019. Will repeat urine studies. She is following Dr. Deleon for hx of chronic back pain. States that she needs an MRI but insurance won't approve it. Wondering if MRI can be ordered here. Informed pt that specific details must be completed/documented before insurance would approve MRI. Will need to request medical records. Denies fever, chills, HA, weakness, dizziness, CP, SOB, abd pain, peripheral swelling, B/B dysfunction, or any other concerns today.     Mammo 4/2019 benign    (6/12/2020): 60 y.o. AAF with a PmHx of HTN, Hypothyroidism, MSH (following Urology), back pain, and Sinus Bradycardia, presenting for routine f/u. Bp at goal today. Currently taking Amlodipine 10 mg po daily and Lisinopril 5 mg po daily. TSH 5.890. " "Currently taking Levothyroxine 25 mcg po daily. Pt endorses cold intolerance (wearing a knit jacket today) and occasional fatigue. Cysto 1/2020 revealed essential hematuria. Discharged from [facility] 2/2019 due to unremarkable work-up. Bradycardia remains but pt is asymptomatic. She denies weakness, dizziness, vision changes, or syncope. No other problems stated.    Health Maintenance:   Colon Ca Screening-FIT negative, 6/3/2020   Breast Ca Screening-Mammo negative, 4/2019  Cervical Ca Screening-5/24/18-  NEGATIVE FOR INTRAEPITHELIAL LESION OR MALIGNANCY. NIL    (12/30/2020): 60 y.o. AAF with a PmHx of HTN, Hypothyroidism, right-sided thyroid nodules, MSH (following Urology), back pain, and Sinus Bradycardia, presenting for routine f/u. Bp at goal today. Currently taking Amlodipine 10 mg po daily and Lisinopril 5 mg po daily. HR remains in 50s. Asymptomatic. Pt referred to ENT earlier this year for thyroid nodules. She was seen in clinic and FNA was planned. After re-review, it was concluded that nodules were likely subcm and plans are to follow with annual US, due 7/2021. She underwent mammo 11/6/2020 that was negative. FIT negative 6/2020. Denies any bloody stools or diarrhea. States bowels move well with drinking coffee. If not drinking coffee, states bowels move "a little bit." This is not new. Asks, "What can I take for that?"     Lab review:   COVID + 11/27/2020   Total WBC count and abs neutrophils decreased. Past peripheral smear revealed WBC morphology WNL.   Hgb slightly decreased   TSH decreased 0.0834, Free T4 upper normal. Denies overt s/s of hyperthyroidism.     She is amenable to receiving the Tdap but not sure about flu or shingles. States will call if she changes her mind.   No other problems stated.    (6/30/2021): 61 y.o. AAF with a PmHx of HTN, Hypothyroidism, right-sided thyroid nodules, MSH (following Urology), back pain, and Sinus Bradycardia, presenting for routine f/u. Bp at goal today. " Currently taking Amlodipine 10 mg po daily and Lisinopril 5 mg po daily. HR 50s. Remains asymptomatic. Previously evaluated by Cards. She is following ENT for multinodular thyroid. Repeat US 7/19/21. She has an appt with ENT 7/8, but plans to re-schedule to after US as she won't be available. Mammo scheduled 11/2021.  Lab review:   Total WBC count decreased. Past peripheral smear revealed WBC morphology WNL.   Total RBC count and Hgb slightly decreased   Vitamin D 28.2   Tota cholesterol 229,    TSH 26.2402; previously 0.0834   HgA1c 5.6%   Abnl UA. No significant growth on urine culture. Previously evaluated by Uro for MSH. S/p Cysto 1/2020.   She has no acute concerns.    (2/17/2022): 62 y.o. AAF with a PmHx of HTN, Hypothyroidism, right-sided thyroid nodules, MSH (following Urology), back pain, and Sinus Bradycardia, presenting for routine f/u. HR remains decreased; currently 40s. Pt denies dizziness, weakness, fatigue, syncope. She was evaluated by Cards in 2019. She had a negative work-up. Was told to f/u PRN. Today, patient has no concerns. Chol 209 (improved from 229 in 6/21) and  (improved from 143 in 6/21). TSH 4.56 (improved from previous 26 in 6/21). Reports compliance with medications. No other concerns today.    Health Maintenance:   Colon Ca Screening-FIT negative, 6/3/2020   Breast Ca Screening-Mammo negative, 11/6/2020   Cervical Ca Screening-1/25/21-  NEGATIVE FOR INTRAEPITHELIAL LESION OR MALIGNANCY. NIL; high risk HPV negative    Today's Visit (8/17/2022): 62 y.o. AAF with a PmHx of HTN, high cholesterol, Hypothyroidism, right-sided thyroid nodules, MSH (following Urology), back pain, and Sinus Bradycardia, presenting for routine f/u. Previously referred to cards for bradycardia. Now following Dr. Almaraz with [facility]. Reports she was taken off of Amlodipine and Lisinopril was titrated to 20 mg po daily. Bp at goal. States scheduled for cardiac testing next month and will f/u with   Sung one week later for results. Labs reviewed: Total WBC count and abs neutrophil remain chronically subnormal. Last peripheral smear normal. Patho cancelled recently ordered peripheral smear due to not meeting criteria; TSH WNL; Vitamin D slightly decreased. Patient is due for colon cancer screening. She denies any acute concerns.      Review of Systems     Review of Systems   Constitutional: Negative.  Negative for chills, diaphoresis, fatigue, fever and unexpected weight change.   HENT: Negative.    Eyes: Negative.    Respiratory: Negative.    Cardiovascular: Negative.    Gastrointestinal: Negative.    Endocrine: Negative.    Genitourinary: Negative.    Musculoskeletal: Negative.    Skin: Negative.    Allergic/Immunologic: Negative.    Neurological: Negative.    Hematological: Negative.    Psychiatric/Behavioral: Negative.        Medical / Social / Family History     Past Medical History:   Diagnosis Date    Bradycardia     Chronic back pain     Essential (primary) hypertension     Hypothyroidism, unspecified     Menopause     Microscopic hematuria     Nontoxic single thyroid nodule     Sleep disorder not due to a substance or known physiological condition, unspecified        History reviewed. No pertinent surgical history.    Social History  Ms.  reports that she has never smoked. She has never used smokeless tobacco. She reports that she does not drink alcohol and does not use drugs.    Family History  Ms.'s family history includes Diabetes type II in her mother; Hypertension in her father and mother.    Medications and Allergies     Medications  Medication List with Changes/Refills   New Medications    ERGOCALCIFEROL (ERGOCALCIFEROL) 50,000 UNIT CAP    Take 1 capsule (50,000 Units total) by mouth every 7 days.   Current Medications    LISINOPRIL (PRINIVIL,ZESTRIL) 20 MG TABLET    Take 20 mg by mouth once daily.   Changed and/or Refilled Medications    Modified Medication Previous Medication     LEVOTHYROXINE (SYNTHROID) 75 MCG TABLET levothyroxine (SYNTHROID) 75 MCG tablet       Take 1 tablet (75 mcg total) by mouth before breakfast.    75 mcg.       Allergies  Review of patient's allergies indicates:   Allergen Reactions    Hydrochlorothiazide      Other reaction(s): rash  Rash    Hydrobenzthiazide Rash    Sulfa (sulfonamide antibiotics) Rash    Sulfur Rash       Physical Examination     Vitals:    08/17/22 0722   BP: 132/71   Pulse: (!) 46   Resp: 20   Temp: 98.2 °F (36.8 °C)     Physical Exam  Constitutional:       Appearance: Normal appearance.   HENT:      Head: Normocephalic and atraumatic.      Right Ear: Tympanic membrane, ear canal and external ear normal.      Left Ear: Tympanic membrane, ear canal and external ear normal.      Nose: Nose normal.      Mouth/Throat:      Mouth: Mucous membranes are moist.      Pharynx: Oropharynx is clear.   Eyes:      Extraocular Movements: Extraocular movements intact.      Conjunctiva/sclera: Conjunctivae normal.      Pupils: Pupils are equal, round, and reactive to light.   Cardiovascular:      Rate and Rhythm: Normal rate and regular rhythm.      Pulses: Normal pulses.      Heart sounds: Normal heart sounds.   Pulmonary:      Effort: Pulmonary effort is normal.      Breath sounds: Normal breath sounds.   Abdominal:      General: Abdomen is flat. Bowel sounds are normal.      Palpations: Abdomen is soft.   Musculoskeletal:         General: Normal range of motion.      Cervical back: Normal range of motion.   Skin:     General: Skin is warm and dry.   Neurological:      General: No focal deficit present.      Mental Status: She is alert and oriented to person, place, and time.   Psychiatric:         Mood and Affect: Mood normal.         Behavior: Behavior normal.         Thought Content: Thought content normal.         Judgment: Judgment normal.           Results     Lab Results   Component Value Date    WBC 3.2 (L) 08/16/2022    RBC 3.97 (L) 08/16/2022     HGB 11.3 (L) 08/16/2022    HCT 35.7 (L) 08/16/2022    MCV 89.9 08/16/2022    MCH 28.5 08/16/2022    MCHC 31.7 (L) 08/16/2022    RDW 13.7 08/16/2022     08/16/2022    MPV 9.3 08/16/2022     CMP  Sodium Level   Date Value Ref Range Status   08/16/2022 142 136 - 145 mmol/L Final     Potassium Level   Date Value Ref Range Status   08/16/2022 3.8 3.5 - 5.1 mmol/L Final     Carbon Dioxide   Date Value Ref Range Status   08/16/2022 26 23 - 31 mmol/L Final     Blood Urea Nitrogen   Date Value Ref Range Status   08/16/2022 13.7 9.8 - 20.1 mg/dL Final     Creatinine   Date Value Ref Range Status   08/16/2022 0.72 0.55 - 1.02 mg/dL Final     Calcium Level Total   Date Value Ref Range Status   08/16/2022 9.2 8.4 - 10.2 mg/dL Final     Albumin Level   Date Value Ref Range Status   08/16/2022 3.7 3.4 - 4.8 gm/dL Final     Bilirubin Total   Date Value Ref Range Status   08/16/2022 0.4 <=1.5 mg/dL Final     Alkaline Phosphatase   Date Value Ref Range Status   08/16/2022 69 40 - 150 unit/L Final     Aspartate Aminotransferase   Date Value Ref Range Status   08/16/2022 28 5 - 34 unit/L Final     Alanine Aminotransferase   Date Value Ref Range Status   08/16/2022 22 0 - 55 unit/L Final     Estimated GFR-Non    Date Value Ref Range Status   12/03/2021 88 (L) >>=90 mL/min/1.73 m2 Final     Lab Results   Component Value Date    CHOL 204 (H) 08/16/2022     Lab Results   Component Value Date    HDL 71 (H) 08/16/2022     No results found for: LDLCALC  Lab Results   Component Value Date    TRIG 43 08/16/2022     No results found for: CHOLHDL  Lab Results   Component Value Date    TSH 4.7580 08/16/2022     Lab Results   Component Value Date    PHUR 5.5 06/25/2021    PROTEINUA Negative 08/16/2022    GLUCUA Negative 06/25/2021    KETONESU Negative 06/25/2021    OCCULTUA 0.1 06/25/2021    NITRITE Negative 06/25/2021    LEUKOCYTESUR Negative 08/16/2022           Assessment and Plan (including Health Maintenance)     Plan:          Health Maintenance Due   Topic Date Due    TETANUS VACCINE  Never done    Shingles Vaccine (1 of 2) Never done    Colorectal Cancer Screening  Never done    COVID-19 Vaccine (4 - Booster for Moderna series) 03/17/2022       Problem List Items Addressed This Visit        Cardiac/Vascular    Hypertension    Current Assessment & Plan     At goal  Continue Lisinopril 20 mg po daily  Educated on aerobic exercise (3-5 days/week) and a low-fat, low-sodium diet  Avoid excess ETOH consumption. Smoking cessation if applicable  ED precautions (s/s of CVA, etc)               Relevant Orders    CBC Auto Differential    Comprehensive Metabolic Panel    Urinalysis, Reflex to Urine Culture Urine, Clean Catch    High cholesterol    Current Assessment & Plan     Lipid panel improved from previous  Educated on a low-fat, low-cholesterol diet and aerobic exercise (20-30 mins/day x 5 days a week). Encouraged healthy fruits and veggie intake. Increase water intake. Avoid excess ETOH intake and smoking             Relevant Orders    Lipid Panel    Bradycardia    Current Assessment & Plan     Asymptomatic  F/u with Cards              Renal/    Microscopic hematuria    Overview     Cysto 1/2020 revealed essential hematuria              Oncology    Other neutropenia    Overview     * Final Report *  RBC, WBC and platelet morphology within normal limits.  Tri Gonzalez MD.  Result type: Perif Smear Eval  Result date: December 28, 2020 8:50 CST  Result status: Auth (Verified)  Performed by: Concha Isaac on January 01, 2021 17:15 CST  Verified by: Tri Gonzalez MD on January 04, 2021 10:58 CST  Encounter info: [id number], [facility], Outpatient, 12/28/2020 - 12/28/2020             Current Assessment & Plan     CBC stable, peripheral smear cancelled by pathologist 8/16/22 d/t not meeting criteria              Endocrine    Hypothyroidism - Primary    Current Assessment & Plan     TSH WNL  Continue LT4 75  mcg po daily           Relevant Medications    levothyroxine (SYNTHROID) 75 MCG tablet    Other Relevant Orders    TSH    T4, Free    Thyroid nodule    Overview     Last seen in ENT 7/2021. US reported stable. [place] [facility] [place]  [place] 7/19/21:  IMPRESSION: 2 subcentimeter thyroid nodules show no significant change  since 2020. No new findings appreciated.               Current Assessment & Plan     Thyroid US           Relevant Orders    US Thyroid    Vitamin D deficiency    Current Assessment & Plan     Vitamin D is low. I am sending a prescription of Vitamin D (Ergocalciferol) to the pharmacy). Pt is to take as prescribed. Once the prescription is completed, pt should obtain Vitamin D3 1,000 IU once daily.             Relevant Medications    ergocalciferol (ERGOCALCIFEROL) 50,000 unit Cap       GI    Screening for colon cancer    Relevant Orders    OCCULT BLOOD FECAL IMMUNOASSAY       Other    Wellness examination    Overview     Colon cancer screening: FIT negative, 6/3/2020  Breast cancer screening: Mammogram BI-RADS: 1 Negative, 3/11/22  Cervical Ca Screening-5/24/18- NEGATIVE FOR INTRAEPITHELIAL LESION OR MALIGNANCY. NIL, 1/25/21 NIL, high-risk HPV negative               Other Visit Diagnoses     Screening for diabetes mellitus              Health Maintenance Topics with due status: Not Due       Topic Last Completion Date    Mammogram 03/11/2022    Lipid Panel 08/16/2022    Influenza Vaccine Not Due    Cervical Cancer Screening Not Due       Future Appointments   Date Time Provider Department Center   2/17/2023  7:15 AM BERTHA Quispe [facility]            Signature:  BERTHA Quispe  [facility]  [address] [id number]    Date of encounter: 8/17/22

## 2022-08-17 ENCOUNTER — OFFICE VISIT (OUTPATIENT)
Dept: INTERNAL MEDICINE | Facility: CLINIC | Age: 62
End: 2022-08-17
Payer: MEDICARE

## 2022-08-17 VITALS
HEART RATE: 46 BPM | SYSTOLIC BLOOD PRESSURE: 132 MMHG | RESPIRATION RATE: 20 BRPM | HEIGHT: 69 IN | BODY MASS INDEX: 20.71 KG/M2 | TEMPERATURE: 98 F | WEIGHT: 139.81 LBS | DIASTOLIC BLOOD PRESSURE: 71 MMHG

## 2022-08-17 DIAGNOSIS — R31.29 MICROSCOPIC HEMATURIA: ICD-10-CM

## 2022-08-17 DIAGNOSIS — Z12.11 SCREENING FOR COLON CANCER: ICD-10-CM

## 2022-08-17 DIAGNOSIS — E55.9 VITAMIN D DEFICIENCY: ICD-10-CM

## 2022-08-17 DIAGNOSIS — E03.9 HYPOTHYROIDISM, UNSPECIFIED TYPE: Primary | ICD-10-CM

## 2022-08-17 DIAGNOSIS — E04.1 THYROID NODULE: ICD-10-CM

## 2022-08-17 DIAGNOSIS — D70.8 OTHER NEUTROPENIA: ICD-10-CM

## 2022-08-17 DIAGNOSIS — Z13.1 SCREENING FOR DIABETES MELLITUS: ICD-10-CM

## 2022-08-17 DIAGNOSIS — E78.00 HIGH CHOLESTEROL: ICD-10-CM

## 2022-08-17 DIAGNOSIS — R00.1 BRADYCARDIA: ICD-10-CM

## 2022-08-17 DIAGNOSIS — Z00.00 WELLNESS EXAMINATION: ICD-10-CM

## 2022-08-17 DIAGNOSIS — I10 HYPERTENSION, UNSPECIFIED TYPE: ICD-10-CM

## 2022-08-17 PROCEDURE — 3075F PR MOST RECENT SYSTOLIC BLOOD PRESS GE 130-139MM HG: ICD-10-PCS | Mod: CPTII,,, | Performed by: NURSE PRACTITIONER

## 2022-08-17 PROCEDURE — 3008F PR BODY MASS INDEX (BMI) DOCUMENTED: ICD-10-PCS | Mod: CPTII,,, | Performed by: NURSE PRACTITIONER

## 2022-08-17 PROCEDURE — 3075F SYST BP GE 130 - 139MM HG: CPT | Mod: CPTII,,, | Performed by: NURSE PRACTITIONER

## 2022-08-17 PROCEDURE — 99214 PR OFFICE/OUTPT VISIT, EST, LEVL IV, 30-39 MIN: ICD-10-PCS | Mod: S$PBB,,, | Performed by: NURSE PRACTITIONER

## 2022-08-17 PROCEDURE — 3078F PR MOST RECENT DIASTOLIC BLOOD PRESSURE < 80 MM HG: ICD-10-PCS | Mod: CPTII,,, | Performed by: NURSE PRACTITIONER

## 2022-08-17 PROCEDURE — 99214 OFFICE O/P EST MOD 30 MIN: CPT | Mod: PBBFAC | Performed by: NURSE PRACTITIONER

## 2022-08-17 PROCEDURE — 3078F DIAST BP <80 MM HG: CPT | Mod: CPTII,,, | Performed by: NURSE PRACTITIONER

## 2022-08-17 PROCEDURE — 4010F PR ACE/ARB THEARPY RXD/TAKEN: ICD-10-PCS | Mod: CPTII,,, | Performed by: NURSE PRACTITIONER

## 2022-08-17 PROCEDURE — 99214 OFFICE O/P EST MOD 30 MIN: CPT | Mod: S$PBB,,, | Performed by: NURSE PRACTITIONER

## 2022-08-17 PROCEDURE — 4010F ACE/ARB THERAPY RXD/TAKEN: CPT | Mod: CPTII,,, | Performed by: NURSE PRACTITIONER

## 2022-08-17 PROCEDURE — 1159F MED LIST DOCD IN RCRD: CPT | Mod: CPTII,,, | Performed by: NURSE PRACTITIONER

## 2022-08-17 PROCEDURE — 1160F RVW MEDS BY RX/DR IN RCRD: CPT | Mod: CPTII,,, | Performed by: NURSE PRACTITIONER

## 2022-08-17 PROCEDURE — 3008F BODY MASS INDEX DOCD: CPT | Mod: CPTII,,, | Performed by: NURSE PRACTITIONER

## 2022-08-17 PROCEDURE — 1159F PR MEDICATION LIST DOCUMENTED IN MEDICAL RECORD: ICD-10-PCS | Mod: CPTII,,, | Performed by: NURSE PRACTITIONER

## 2022-08-17 PROCEDURE — 1160F PR REVIEW ALL MEDS BY PRESCRIBER/CLIN PHARMACIST DOCUMENTED: ICD-10-PCS | Mod: CPTII,,, | Performed by: NURSE PRACTITIONER

## 2022-08-17 RX ORDER — ERGOCALCIFEROL 1.25 MG/1
50000 CAPSULE ORAL
Qty: 4 CAPSULE | Refills: 0 | Status: SHIPPED | OUTPATIENT
Start: 2022-08-17 | End: 2022-09-16

## 2022-08-17 RX ORDER — LEVOTHYROXINE SODIUM 75 UG/1
75 TABLET ORAL
Qty: 90 TABLET | Refills: 1 | Status: SHIPPED | OUTPATIENT
Start: 2022-08-17 | End: 2023-02-08 | Stop reason: SDUPTHER

## 2022-08-17 RX ORDER — LEVOTHYROXINE SODIUM 75 UG/1
75 TABLET ORAL
COMMUNITY
Start: 2022-08-05 | End: 2022-08-17 | Stop reason: SDUPTHER

## 2022-08-17 RX ORDER — LISINOPRIL 20 MG/1
20 TABLET ORAL DAILY
COMMUNITY
Start: 2022-08-08 | End: 2023-02-08 | Stop reason: SDUPTHER

## 2022-08-17 NOTE — ASSESSMENT & PLAN NOTE
Vitamin D is low. I am sending a prescription of Vitamin D (Ergocalciferol) to the pharmacy). Pt is to take as prescribed. Once the prescription is completed, pt should obtain Vitamin D3 1,000 IU once daily.

## 2022-08-17 NOTE — ASSESSMENT & PLAN NOTE
At goal  Continue Lisinopril 20 mg po daily  Educated on aerobic exercise (3-5 days/week) and a low-fat, low-sodium diet  Avoid excess ETOH consumption. Smoking cessation if applicable  ED precautions (s/s of CVA, etc)

## 2022-08-17 NOTE — ASSESSMENT & PLAN NOTE
Lipid panel improved from previous  Educated on a low-fat, low-cholesterol diet and aerobic exercise (20-30 mins/day x 5 days a week). Encouraged healthy fruits and veggie intake. Increase water intake. Avoid excess ETOH intake and smoking

## 2022-09-02 ENCOUNTER — HOSPITAL ENCOUNTER (EMERGENCY)
Facility: HOSPITAL | Age: 62
Discharge: HOME | End: 2022-09-02
Attending: EMERGENCY MEDICINE
Payer: MEDICARE

## 2022-09-02 VITALS
RESPIRATION RATE: 18 BRPM | WEIGHT: 138.88 LBS | SYSTOLIC BLOOD PRESSURE: 144 MMHG | TEMPERATURE: 98 F | BODY MASS INDEX: 21.05 KG/M2 | OXYGEN SATURATION: 100 % | HEART RATE: 65 BPM | DIASTOLIC BLOOD PRESSURE: 57 MMHG | HEIGHT: 68 IN

## 2022-09-02 DIAGNOSIS — M54.50 LOW BACK PAIN, UNSPECIFIED BACK PAIN LATERALITY, UNSPECIFIED CHRONICITY, UNSPECIFIED WHETHER SCIATICA PRESENT: Primary | ICD-10-CM

## 2022-09-02 DIAGNOSIS — R51.9 GENERALIZED HEADACHE: ICD-10-CM

## 2022-09-02 LAB
APPEARANCE UR: CLEAR
BACTERIA #/AREA URNS AUTO: ABNORMAL /HPF
BILIRUB UR QL STRIP.AUTO: NEGATIVE MG/DL
COLOR UR AUTO: YELLOW
GLUCOSE UR QL STRIP.AUTO: NORMAL MG/DL
HYALINE CASTS #/AREA URNS LPF: ABNORMAL /LPF
KETONES UR QL STRIP.AUTO: NEGATIVE MG/DL
LEUKOCYTE ESTERASE UR QL STRIP.AUTO: 75 UNIT/L
MUCOUS THREADS URNS QL MICRO: ABNORMAL /LPF
NITRITE UR QL STRIP.AUTO: NEGATIVE
PH UR STRIP.AUTO: 5 [PH]
PROT UR QL STRIP.AUTO: NEGATIVE MG/DL
RBC #/AREA URNS AUTO: ABNORMAL /HPF
RBC UR QL AUTO: ABNORMAL UNIT/L
SARS-COV-2 RDRP RESP QL NAA+PROBE: NEGATIVE
SP GR UR STRIP.AUTO: 1.03
SQUAMOUS #/AREA URNS LPF: ABNORMAL /HPF
UROBILINOGEN UR STRIP-ACNC: NORMAL MG/DL
WBC #/AREA URNS AUTO: ABNORMAL /HPF

## 2022-09-02 PROCEDURE — 99284 EMERGENCY DEPT VISIT MOD MDM: CPT | Mod: 25

## 2022-09-02 PROCEDURE — 63600175 PHARM REV CODE 636 W HCPCS: Performed by: PHYSICIAN ASSISTANT

## 2022-09-02 PROCEDURE — 81001 URINALYSIS AUTO W/SCOPE: CPT | Performed by: PHYSICIAN ASSISTANT

## 2022-09-02 PROCEDURE — 96372 THER/PROPH/DIAG INJ SC/IM: CPT | Performed by: PHYSICIAN ASSISTANT

## 2022-09-02 PROCEDURE — 87635 SARS-COV-2 COVID-19 AMP PRB: CPT | Performed by: PHYSICIAN ASSISTANT

## 2022-09-02 RX ORDER — BUTALBITAL, ACETAMINOPHEN AND CAFFEINE 50; 325; 40 MG/1; MG/1; MG/1
1 TABLET ORAL EVERY 6 HOURS PRN
Qty: 30 TABLET | Refills: 0 | Status: SHIPPED | OUTPATIENT
Start: 2022-09-02 | End: 2023-02-08

## 2022-09-02 RX ORDER — METHOCARBAMOL 100 MG/ML
500 INJECTION, SOLUTION INTRAMUSCULAR; INTRAVENOUS
Status: COMPLETED | OUTPATIENT
Start: 2022-09-02 | End: 2022-09-02

## 2022-09-02 RX ORDER — IBUPROFEN 800 MG/1
800 TABLET ORAL EVERY 8 HOURS PRN
Qty: 21 TABLET | Refills: 0 | Status: SHIPPED | OUTPATIENT
Start: 2022-09-02 | End: 2022-09-02 | Stop reason: SDUPTHER

## 2022-09-02 RX ORDER — KETOROLAC TROMETHAMINE 30 MG/ML
15 INJECTION, SOLUTION INTRAMUSCULAR; INTRAVENOUS
Status: COMPLETED | OUTPATIENT
Start: 2022-09-02 | End: 2022-09-02

## 2022-09-02 RX ORDER — TIZANIDINE 4 MG/1
4 TABLET ORAL EVERY 8 HOURS PRN
Qty: 21 TABLET | Refills: 0 | Status: SHIPPED | OUTPATIENT
Start: 2022-09-02 | End: 2022-09-14

## 2022-09-02 RX ORDER — IBUPROFEN 800 MG/1
800 TABLET ORAL EVERY 8 HOURS PRN
Qty: 21 TABLET | Refills: 0 | Status: SHIPPED | OUTPATIENT
Start: 2022-09-02 | End: 2022-09-14

## 2022-09-02 RX ORDER — BUTALBITAL, ACETAMINOPHEN AND CAFFEINE 50; 325; 40 MG/1; MG/1; MG/1
1 TABLET ORAL EVERY 6 HOURS PRN
Qty: 30 TABLET | Refills: 0 | Status: SHIPPED | OUTPATIENT
Start: 2022-09-02 | End: 2022-09-02 | Stop reason: SDUPTHER

## 2022-09-02 RX ADMIN — METHOCARBAMOL 500 MG: 100 INJECTION, SOLUTION INTRAMUSCULAR; INTRAVENOUS at 06:09

## 2022-09-02 RX ADMIN — KETOROLAC TROMETHAMINE 15 MG: 30 INJECTION, SOLUTION INTRAMUSCULAR; INTRAVENOUS at 06:09

## 2022-09-02 NOTE — ED PROVIDER NOTES
Encounter Date: 9/2/2022       History     Chief Complaint   Patient presents with    Headache     States her pc took her off one of her b/p meds and has been having freq ha 's , also co of low back pain , nad      Patient with pmhx of HTN presents today c/o almost daily generalized headaches for 3 weeks. She denies any other associated symptoms. No exacerbating or relieving factors. She has tried taking tylenol with no improvement. She says she recently saw a cardiologist; he took her off of amlodipine and increased her dose of lisinopril so she is wondering if that has something to do with her frequent headaches. She also reports low back pain that started yesterday. Pain is worse with movement. Denies head injury or trauma, nausea, vomiting, dizziness, vision changes, focal weakness, paresthesia, neck pain, syncope, abdominal pain, cp, sob, back injury or trauma, dysuria, hematuria.     The history is provided by the patient. No  was used.   Review of patient's allergies indicates:   Allergen Reactions    Hydrochlorothiazide      Other reaction(s): rash  Rash    Hydrobenzthiazide Rash    Sulfa (sulfonamide antibiotics) Rash    Sulfur Rash     Past Medical History:   Diagnosis Date    Bradycardia     Chronic back pain     Essential (primary) hypertension     Hypothyroidism, unspecified     Menopause     Microscopic hematuria     Nontoxic single thyroid nodule     Sleep disorder not due to a substance or known physiological condition, unspecified      No past surgical history on file.  Family History   Problem Relation Age of Onset    Diabetes type II Mother     Hypertension Mother     Hypertension Father      Social History     Tobacco Use    Smoking status: Never    Smokeless tobacco: Never   Substance Use Topics    Alcohol use: Never    Drug use: Never     Review of Systems   Constitutional:  Negative for chills and fever.   HENT:  Negative for congestion, ear pain, postnasal drip,  rhinorrhea, sinus pressure, sinus pain and sore throat.    Eyes:  Negative for photophobia, pain and visual disturbance.   Respiratory:  Negative for cough, chest tightness and shortness of breath.    Cardiovascular:  Negative for chest pain, palpitations and leg swelling.   Gastrointestinal:  Negative for abdominal pain, constipation, diarrhea, nausea and vomiting.   Genitourinary:  Negative for dysuria, flank pain and hematuria.   Musculoskeletal:  Positive for back pain. Negative for arthralgias, gait problem, joint swelling, myalgias, neck pain and neck stiffness.   Neurological:  Positive for headaches. Negative for dizziness, tremors, seizures, syncope, facial asymmetry, speech difficulty, weakness, light-headedness and numbness.     Physical Exam     Initial Vitals [09/02/22 1630]   BP Pulse Resp Temp SpO2   (!) 144/57 65 18 98.1 °F (36.7 °C) 100 %      MAP       --         Physical Exam    Vitals reviewed.  Constitutional: She appears well-developed and well-nourished. She is not diaphoretic. No distress.   HENT:   Head: Normocephalic and atraumatic.   Mouth/Throat: Oropharynx is clear and moist. No oropharyngeal exudate.   Eyes: Conjunctivae and EOM are normal. Pupils are equal, round, and reactive to light. No scleral icterus.   Neck: Neck supple.   Normal range of motion.  Cardiovascular:  Normal rate, regular rhythm, normal heart sounds and intact distal pulses.           Pulmonary/Chest: Breath sounds normal. No respiratory distress.   Abdominal: Abdomen is soft. Bowel sounds are normal. She exhibits no distension. There is no abdominal tenderness. There is no rebound and no guarding.   Musculoskeletal:         General: No edema.      Cervical back: Normal range of motion and neck supple.      Comments: Mild ttp over bilateral lumbar paraspinal muscles. No VPT. No step offs. Full ROM of lumbar spine, but increased pain with movement.      Neurological: She is alert and oriented to person, place, and  time. She has normal strength. No sensory deficit. GCS score is 15. GCS eye subscore is 4. GCS verbal subscore is 5. GCS motor subscore is 6.   Skin: Skin is warm and dry. Capillary refill takes less than 2 seconds.   Psychiatric: She has a normal mood and affect.       ED Course   Procedures  Labs Reviewed   URINALYSIS, REFLEX TO URINE CULTURE - Abnormal; Notable for the following components:       Result Value    Blood, UA 2+ (*)     Squamous Epithelial Cells, UA Few (*)     Mucous, UA Occ (*)     All other components within normal limits   SARS-COV-2 RNA AMPLIFICATION, QUAL - Normal          Imaging Results    None          Medications   ketorolac injection 15 mg (15 mg Intramuscular Given 9/2/22 1833)   methocarbamoL injection 500 mg (500 mg Intramuscular Given 9/2/22 1833)                        Clinical Impression:   Final diagnoses:  [M54.50] Low back pain, unspecified back pain laterality, unspecified chronicity, unspecified whether sciatica present (Primary)  [R51.9] Generalized headache      ED Disposition Condition    Discharge Stable          ED Prescriptions       Medication Sig Dispense Start Date End Date Auth. Provider    ibuprofen (ADVIL,MOTRIN) 800 MG tablet  (Status: Discontinued) Take 1 tablet (800 mg total) by mouth every 8 (eight) hours as needed for Pain. 21 tablet 9/2/2022 9/2/2022 BALDEMAR Garces    butalbital-acetaminophen-caffeine -40 mg (FIORICET, ESGIC) -40 mg per tablet  (Status: Discontinued) Take 1 tablet by mouth every 6 (six) hours as needed for Headaches. 30 tablet 9/2/2022 9/2/2022 BALDEMAR Garces    tiZANidine (ZANAFLEX) 4 MG tablet Take 1 tablet (4 mg total) by mouth every 8 (eight) hours as needed (for pain or muscle spasm). 21 tablet 9/2/2022 -- BALDEMAR Garces    butalbital-acetaminophen-caffeine -40 mg (FIORICET, ESGIC) -40 mg per tablet Take 1 tablet by mouth every 6 (six) hours as needed for Headaches. 30 tablet 9/2/2022  -- BALDEMAR Garces    ibuprofen (ADVIL,MOTRIN) 800 MG tablet Take 1 tablet (800 mg total) by mouth every 8 (eight) hours as needed for Pain. 21 tablet 9/2/2022 -- BALDEMAR Garces          Follow-up Information       Follow up With Specialties Details Why Contact Info    [facility] Emergency Medicine  If symptoms worsen return to ED immediately [address] [facility] [address] [phone]    Primary Care Provider within 1-2 days  Go in 1 day               BALDEMAR Garces  09/02/22 4854

## 2022-09-02 NOTE — Clinical Note
"Julianne Huddlestonchapin Parker was seen and treated in our emergency department on 9/2/2022.  She may return to work on 09/05/2022.       If you have any questions or concerns, please don't hesitate to call.      BALDEMAR Garces"

## 2022-09-06 ENCOUNTER — TELEPHONE (OUTPATIENT)
Dept: INTERNAL MEDICINE | Facility: CLINIC | Age: 62
End: 2022-09-06

## 2022-09-06 NOTE — TELEPHONE ENCOUNTER
Pt called stating that she went to the ER on Friday she had been having headaches and was taken off her BP medication and thinking that maybe the cause of the headaches. Pt is requesting a call from you to discuss

## 2022-09-12 ENCOUNTER — TELEPHONE (OUTPATIENT)
Dept: ADMINISTRATIVE | Facility: HOSPITAL | Age: 62
End: 2022-09-12
Payer: MEDICARE

## 2022-09-14 ENCOUNTER — OFFICE VISIT (OUTPATIENT)
Dept: INTERNAL MEDICINE | Facility: CLINIC | Age: 62
End: 2022-09-14
Payer: MEDICARE

## 2022-09-14 DIAGNOSIS — M54.50 ACUTE BILATERAL LOW BACK PAIN WITHOUT SCIATICA: ICD-10-CM

## 2022-09-14 DIAGNOSIS — R51.9 NONINTRACTABLE HEADACHE, UNSPECIFIED CHRONICITY PATTERN, UNSPECIFIED HEADACHE TYPE: ICD-10-CM

## 2022-09-14 DIAGNOSIS — I10 HYPERTENSION, UNSPECIFIED TYPE: Primary | ICD-10-CM

## 2022-09-14 PROCEDURE — 1160F PR REVIEW ALL MEDS BY PRESCRIBER/CLIN PHARMACIST DOCUMENTED: ICD-10-PCS | Mod: CPTII,95,, | Performed by: NURSE PRACTITIONER

## 2022-09-14 PROCEDURE — 99442 PR PHYSICIAN TELEPHONE EVALUATION 11-20 MIN: ICD-10-PCS | Mod: 95,,, | Performed by: NURSE PRACTITIONER

## 2022-09-14 PROCEDURE — 1159F PR MEDICATION LIST DOCUMENTED IN MEDICAL RECORD: ICD-10-PCS | Mod: CPTII,95,, | Performed by: NURSE PRACTITIONER

## 2022-09-14 PROCEDURE — 99442 PR PHYSICIAN TELEPHONE EVALUATION 11-20 MIN: CPT | Mod: 95,,, | Performed by: NURSE PRACTITIONER

## 2022-09-14 PROCEDURE — 1160F RVW MEDS BY RX/DR IN RCRD: CPT | Mod: CPTII,95,, | Performed by: NURSE PRACTITIONER

## 2022-09-14 PROCEDURE — 4010F ACE/ARB THERAPY RXD/TAKEN: CPT | Mod: CPTII,95,, | Performed by: NURSE PRACTITIONER

## 2022-09-14 PROCEDURE — 1159F MED LIST DOCD IN RCRD: CPT | Mod: CPTII,95,, | Performed by: NURSE PRACTITIONER

## 2022-09-14 PROCEDURE — 4010F PR ACE/ARB THEARPY RXD/TAKEN: ICD-10-PCS | Mod: CPTII,95,, | Performed by: NURSE PRACTITIONER

## 2022-09-14 RX ORDER — ACETAMINOPHEN 500 MG
TABLET ORAL
Qty: 1 EACH | Refills: 0 | OUTPATIENT
Start: 2022-09-14

## 2022-09-14 RX ORDER — ACETAMINOPHEN 500 MG
TABLET ORAL
Qty: 1 EACH | Refills: 0 | OUTPATIENT
Start: 2022-09-14 | End: 2022-09-14 | Stop reason: SDUPTHER

## 2022-09-14 NOTE — PROGRESS NOTES
BERTHA Quispe   [facility]  [address] [facility] [address]      PATIENT NAME: Julianne Parker  : 1960  DATE: 22  MRN: [id number]      Billing Provider: BERTHA Quispe  Level of Service:   Patient PCP Information       Provider PCP Type    BERTHA Quispe General            Reason for Visit / Chief Complaint: Follow-up and Headache       History of Present Illness / Problem Focused Workflow     Julianne Parker presents to the clinic with Follow-up and Headache     Initial Visit 18: 58 y.o. AAF presenting to the clinic to re-establish primary care. Previous PCP SOLIS Baez NP. Last OV 2018. PMHx significant for HTN, Hypothyroidism, and Sinus Bradycardia. Bp at goal today. Currently taking Amlodipine 10 mg po daily and Lisinopril 5 mg po daily. TSH 2.700 (2018). Currently taking Levothyroxine 25 mcg po daily. Denies s/s of hypo/hyperthyroidism. Seen by Cardio 2018. Work-up mostly negative for bradycardia. Pt remains asymptomatic. She is to f/u with Cardio PRN. Following Urology for microscopic hematuria. Plan is for cystoscopy in the future. CT Abd/Pelvis was unremarkable. Pt presented to ED 2018 w/ c/o right breast lump. Diagnostic MMG completed. Recommendations were to f/u with right breast US. US needed. She was a NS for US in September. She is requesting appt be rescheduled. Pt does states that she can no longer feel the lump. Denies fever, chills, night sweats, CP, SOB, Abd pain, or any other concerns today.    4/3/19: 58 y.o. AAF with PMHx significant for HTN, Hypothyroidism, and Sinus Bradycardia, presenting for f/u. Bp at goal today. Currently taking Amlodipine 10 mg po daily and Lisinopril 5 mg po daily. TSH 1.570 (WNL). Currently taking Levothyroxine 25 mcg po daily. Denies s/s of hypo/hyperthyroidism. Seen by Cardio 19. HR stable. Remains asymptomatic. F/u with Cardio PRN. Pt was  "contacted by GI lab to scheduled cysto in 12/2018 but she never followed-up. Number to lab provided. Pt states that she will call today to try and schedule procedure. Reports that she had a Sleep Study conducted in 2015. Reviewed results with pt which revealed primary snoring. She did not meet criteria for ILENE. Pt replied "I'm good," and expressed that she's not interested in doing any repeat studies at this time as her symptoms have not changed/worsened. MMG scheduled Tuesday, April 23rd, 2019. Reports that she will return FIT as soon as she can. Denies fever, chills, HA, CP, SOB, abd pain, peripheral swelling, B/B dysfunction, or any other concerns today.    (10/3/19): Pt presenting for 6-mth f/u, Hypothyroidism, and Sinus Bradycardia. Bp at goal today. Currently taking Amlodipine 10 mg po daily and Lisinopril 5 mg po daily. TSH 1.570 (WNL) 2/26/19. Currently taking Levothyroxine 25 mcg po daily. Denies s/s of hypo/hyperthyroidism. New TSH level pending. Pt did not complete labs. She is not fasting so she plans to come to lab on Monday. She is amenable to returning FIT (has refused in the past). Doesn't appear that she's called to schedule cysto for hx of MSH. She was a NS for procedure 7/2019. Will repeat urine studies. She is following Dr. Deleon for hx of chronic back pain. States that she needs an MRI but insurance won't approve it. Wondering if MRI can be ordered here. Informed pt that specific details must be completed/documented before insurance would approve MRI. Will need to request medical records. Denies fever, chills, HA, weakness, dizziness, CP, SOB, abd pain, peripheral swelling, B/B dysfunction, or any other concerns today.     Mammo 4/2019 benign    (6/12/2020): 60 y.o. AAF with a PmHx of HTN, Hypothyroidism, MSH (following Urology), back pain, and Sinus Bradycardia, presenting for routine f/u. Bp at goal today. Currently taking Amlodipine 10 mg po daily and Lisinopril 5 mg po daily. TSH 5.890. " "Currently taking Levothyroxine 25 mcg po daily. Pt endorses cold intolerance (wearing a knit jacket today) and occasional fatigue. Cysto 1/2020 revealed essential hematuria. Discharged from [facility] 2/2019 due to unremarkable work-up. Bradycardia remains but pt is asymptomatic. She denies weakness, dizziness, vision changes, or syncope. No other problems stated.    Health Maintenance:   Colon Ca Screening-FIT negative, 6/3/2020   Breast Ca Screening-Mammo negative, 4/2019  Cervical Ca Screening-5/24/18-  NEGATIVE FOR INTRAEPITHELIAL LESION OR MALIGNANCY. NIL    (12/30/2020): 60 y.o. AAF with a PmHx of HTN, Hypothyroidism, right-sided thyroid nodules, MSH (following Urology), back pain, and Sinus Bradycardia, presenting for routine f/u. Bp at goal today. Currently taking Amlodipine 10 mg po daily and Lisinopril 5 mg po daily. HR remains in 50s. Asymptomatic. Pt referred to ENT earlier this year for thyroid nodules. She was seen in clinic and FNA was planned. After re-review, it was concluded that nodules were likely subcm and plans are to follow with annual US, due 7/2021. She underwent mammo 11/6/2020 that was negative. FIT negative 6/2020. Denies any bloody stools or diarrhea. States bowels move well with drinking coffee. If not drinking coffee, states bowels move "a little bit." This is not new. Asks, "What can I take for that?"     Lab review:   COVID + 11/27/2020   Total WBC count and abs neutrophils decreased. Past peripheral smear revealed WBC morphology WNL.   Hgb slightly decreased   TSH decreased 0.0834, Free T4 upper normal. Denies overt s/s of hyperthyroidism.     She is amenable to receiving the Tdap but not sure about flu or shingles. States will call if she changes her mind.   No other problems stated.    (6/30/2021): 61 y.o. AAF with a PmHx of HTN, Hypothyroidism, right-sided thyroid nodules, MSH (following Urology), back pain, and Sinus Bradycardia, presenting for routine f/u. Bp at goal today. " Currently taking Amlodipine 10 mg po daily and Lisinopril 5 mg po daily. HR 50s. Remains asymptomatic. Previously evaluated by Cards. She is following ENT for multinodular thyroid. Repeat US 7/19/21. She has an appt with ENT 7/8, but plans to re-schedule to after US as she won't be available. Mammo scheduled 11/2021.  Lab review:   Total WBC count decreased. Past peripheral smear revealed WBC morphology WNL.   Total RBC count and Hgb slightly decreased   Vitamin D 28.2   Tota cholesterol 229,    TSH 26.2402; previously 0.0834   HgA1c 5.6%   Abnl UA. No significant growth on urine culture. Previously evaluated by Uro for MSH. S/p Cysto 1/2020.   She has no acute concerns.    (2/17/2022): 62 y.o. AAF with a PmHx of HTN, Hypothyroidism, right-sided thyroid nodules, MSH (following Urology), back pain, and Sinus Bradycardia, presenting for routine f/u. HR remains decreased; currently 40s. Pt denies dizziness, weakness, fatigue, syncope. She was evaluated by Cards in 2019. She had a negative work-up. Was told to f/u PRN. Today, patient has no concerns. Chol 209 (improved from 229 in 6/21) and  (improved from 143 in 6/21). TSH 4.56 (improved from previous 26 in 6/21). Reports compliance with medications. No other concerns today.    Health Maintenance:   Colon Ca Screening-FIT negative, 6/3/2020   Breast Ca Screening-Mammo negative, 11/6/2020   Cervical Ca Screening-1/25/21-  NEGATIVE FOR INTRAEPITHELIAL LESION OR MALIGNANCY. NIL; high risk HPV negative    (8/17/2022): 62 y.o. AAF with a PmHx of HTN, high cholesterol, Hypothyroidism, right-sided thyroid nodules, MSH (following Urology), back pain, and Sinus Bradycardia, presenting for routine f/u. Previously referred to cards for bradycardia. Now following Dr. Almaraz with [facility]. Reports she was taken off of Amlodipine and Lisinopril was titrated to 20 mg po daily. Bp at goal. States scheduled for cardiac testing next month and will f/u with Dr. Almaraz one week  "later for results. Labs reviewed: Total WBC count and abs neutrophil remain chronically subnormal. Last peripheral smear normal. Patho cancelled recently ordered peripheral smear due to not meeting criteria; TSH WNL; Vitamin D slightly decreased. Patient is due for colon cancer screening. She denies any acute concerns.    Today's Visit (9/14/2022): 62 y.o. AAF with a PmHx of HTN, high cholesterol, Hypothyroidism, right-sided thyroid nodules, MSH (following Urology), back pain, and Sinus Bradycardia, presenting for routine ED f/u. She presented to ED on 9/2/22 with c/o generalized headaches and lower back pain. She'd mentioned recently being taken off of Amlodipine per Cards, thinking it was likely contributing. Bp 144/57 in ED. She is taking Lisinopril 30 mg po daily, and Bp was at goal at August appt. She was given Toradol and Methocarbamol in ED. Discharged with Rx for IBU, Fioricet, and Zanaflex. Patient admits that she didn't fill any prescriptions because she's feeling "a lot better." States Toradol was helpful in ED. Back pain is resolved and Headache much improved.     Requesting letter to join aerobics class.    No other concerns.      Review of Systems     Review of Systems   Constitutional: Negative.  Negative for chills, diaphoresis, fatigue, fever and unexpected weight change.   HENT: Negative.     Eyes: Negative.    Respiratory: Negative.     Cardiovascular: Negative.    Gastrointestinal: Negative.    Endocrine: Negative.    Genitourinary: Negative.    Musculoskeletal: Negative.    Skin: Negative.    Allergic/Immunologic: Negative.    Neurological:  Negative for dizziness and numbness.   Hematological: Negative.    Psychiatric/Behavioral: Negative.       Medical / Social / Family History     Past Medical History:   Diagnosis Date    Bradycardia     Chronic back pain     Essential (primary) hypertension     Hypothyroidism, unspecified     Menopause     Microscopic hematuria     Nontoxic single thyroid " nodule     Sleep disorder not due to a substance or known physiological condition, unspecified        History reviewed. No pertinent surgical history.    Social History  Ms.  reports that she has never smoked. She has never used smokeless tobacco. She reports that she does not drink alcohol and does not use drugs.    Family History  Ms.'s family history includes Diabetes type II in her mother; Hypertension in her father and mother.    Medications and Allergies     Medications  Medication List with Changes/Refills   Current Medications    BUTALBITAL-ACETAMINOPHEN-CAFFEINE -40 MG (FIORICET, ESGIC) -40 MG PER TABLET    Take 1 tablet by mouth every 6 (six) hours as needed for Headaches.    ERGOCALCIFEROL (ERGOCALCIFEROL) 50,000 UNIT CAP    Take 1 capsule (50,000 Units total) by mouth every 7 days.    IBUPROFEN (ADVIL,MOTRIN) 800 MG TABLET    Take 1 tablet (800 mg total) by mouth every 8 (eight) hours as needed for Pain.    LEVOTHYROXINE (SYNTHROID) 75 MCG TABLET    Take 1 tablet (75 mcg total) by mouth before breakfast.    LISINOPRIL (PRINIVIL,ZESTRIL) 20 MG TABLET    Take 20 mg by mouth once daily.    TIZANIDINE (ZANAFLEX) 4 MG TABLET    Take 1 tablet (4 mg total) by mouth every 8 (eight) hours as needed (for pain or muscle spasm).       Allergies  Review of patient's allergies indicates:   Allergen Reactions    Hydrochlorothiazide      Other reaction(s): rash  Rash    Hydrobenzthiazide Rash    Sulfa (sulfonamide antibiotics) Rash    Sulfur Rash       Physical Examination     There were no vitals filed for this visit.    Physical Exam  Neurological:      Mental Status: She is alert and oriented to person, place, and time.   Psychiatric:         Mood and Affect: Mood normal.         Behavior: Behavior normal.         Thought Content: Thought content normal.         Judgment: Judgment normal.         Results     Lab Results   Component Value Date    WBC 3.2 (L) 08/16/2022    RBC 3.97 (L) 08/16/2022    HGB  11.3 (L) 08/16/2022    HCT 35.7 (L) 08/16/2022    MCV 89.9 08/16/2022    MCH 28.5 08/16/2022    MCHC 31.7 (L) 08/16/2022    RDW 13.7 08/16/2022     08/16/2022    MPV 9.3 08/16/2022     CMP  Sodium Level   Date Value Ref Range Status   08/16/2022 142 136 - 145 mmol/L Final     Potassium Level   Date Value Ref Range Status   08/16/2022 3.8 3.5 - 5.1 mmol/L Final     Carbon Dioxide   Date Value Ref Range Status   08/16/2022 26 23 - 31 mmol/L Final     Blood Urea Nitrogen   Date Value Ref Range Status   08/16/2022 13.7 9.8 - 20.1 mg/dL Final     Creatinine   Date Value Ref Range Status   08/16/2022 0.72 0.55 - 1.02 mg/dL Final     Calcium Level Total   Date Value Ref Range Status   08/16/2022 9.2 8.4 - 10.2 mg/dL Final     Albumin Level   Date Value Ref Range Status   08/16/2022 3.7 3.4 - 4.8 gm/dL Final     Bilirubin Total   Date Value Ref Range Status   08/16/2022 0.4 <=1.5 mg/dL Final     Alkaline Phosphatase   Date Value Ref Range Status   08/16/2022 69 40 - 150 unit/L Final     Aspartate Aminotransferase   Date Value Ref Range Status   08/16/2022 28 5 - 34 unit/L Final     Alanine Aminotransferase   Date Value Ref Range Status   08/16/2022 22 0 - 55 unit/L Final     Estimated GFR-Non    Date Value Ref Range Status   12/03/2021 88 (L) >>=90 mL/min/1.73 m2 Final     Lab Results   Component Value Date    CHOL 204 (H) 08/16/2022     Lab Results   Component Value Date    HDL 71 (H) 08/16/2022     No results found for: LDLCALC  Lab Results   Component Value Date    TRIG 43 08/16/2022     No results found for: CHOLHDL  Lab Results   Component Value Date    TSH 4.7580 08/16/2022     Lab Results   Component Value Date    PHUR 5.5 06/25/2021    PROTEINUA Negative 09/02/2022    GLUCUA Negative 06/25/2021    KETONESU Negative 06/25/2021    OCCULTUA 0.1 06/25/2021    NITRITE Negative 06/25/2021    LEUKOCYTESUR 75 09/02/2022           Assessment and Plan (including Health Maintenance)     Plan:          Health Maintenance Due   Topic Date Due    TETANUS VACCINE  Never done    Shingles Vaccine (1 of 2) Never done    Colorectal Cancer Screening  Never done    COVID-19 Vaccine (4 - Booster for Moderna series) 03/17/2022    Influenza Vaccine (1) Never done       Problem List Items Addressed This Visit          Neuro    Headache    Current Assessment & Plan     Feeling better. States never filled Fioricet d/t HA improved  Monitor BP            Cardiac/Vascular    Hypertension - Primary    Current Assessment & Plan     Previously at goal, but mildly elevated during recent ED visit. Discussed titrating Lisinopril to 30 mg po daily, but she is hesitant to increase noting feeling better  RX for BP monitor sent. Enc to check BP check daily and report any readings > SBP > 140 or DBP > 90            Orthopedic    Acute bilateral low back pain without sciatica    Current Assessment & Plan     Resolved            Health Maintenance Topics with due status: Not Due       Topic Last Completion Date    Mammogram 03/11/2022    Lipid Panel 08/16/2022    Cervical Cancer Screening Not Due       Future Appointments   Date Time Provider Department Center   2/17/2023  7:15 AM BERTHA Quispe [facility]      Audio Only Telehealth Visit     The patient location is: home  The chief complaint leading to consultation is: ED f/u  Visit type: Virtual visit with audio only (telephone)  Total time spent with patient: 13 minutes     The reason for the audio only service rather than synchronous audio and video virtual visit was related to technical difficulties or patient preference/necessity.     Each patient to whom I provide medical services by telemedicine is:  (1) informed of the relationship between the physician and patient and the respective role of any other health care provider with respect to management of the patient; and (2) notified that they may decline to receive medical services by telemedicine and may  withdraw from such care at any time. Patient verbally consented to receive this service via voice-only telephone call.     This service was not originating from a related E/M service provided within the previous 7 days nor will  to an E/M service or procedure within the next 24 hours or my soonest available appointment.  Prevailing standard of care was able to be met in this audio-only visit.           Signature:  BERTHA Quispe  [facility]  [address] [facility] [address]    Date of encounter: 9/14/22

## 2022-09-14 NOTE — ASSESSMENT & PLAN NOTE
Previously at goal, but mildly elevated during recent ED visit. Discussed titrating Lisinopril to 30 mg po daily, but she is hesitant to increase noting feeling better  RX for BP monitor sent. Enc to check BP check daily and report any readings > SBP > 140 or DBP > 90  F/u 1 mth for HTN

## 2022-09-14 NOTE — LETTER
September 14, 2022      [facility]  [facility] [facility] [address]  Phone: [phone]       Patient: Julianne Parker   YOB: 1960  Date of Visit: 09/14/2022    To Whom It May Concern:    Tashia Parker  was at [facility] on 09/14/2022. The patient may participate in light to moderate aerobic activity with no restrictions. If you have any questions or concerns, or if I can be of further assistance, please do not hesitate to contact me.    Sincerely,       BERTHA Quispe

## 2022-10-31 ENCOUNTER — TELEPHONE (OUTPATIENT)
Dept: ADMINISTRATIVE | Facility: HOSPITAL | Age: 62
End: 2022-10-31
Payer: MEDICARE

## 2022-10-31 ENCOUNTER — HOSPITAL ENCOUNTER (OUTPATIENT)
Dept: RADIOLOGY | Facility: HOSPITAL | Age: 62
Discharge: HOME | End: 2022-10-31
Attending: NURSE PRACTITIONER
Payer: MEDICARE

## 2022-10-31 DIAGNOSIS — E04.1 THYROID NODULE: ICD-10-CM

## 2022-10-31 PROCEDURE — 76536 US EXAM OF HEAD AND NECK: CPT | Mod: TC

## 2022-11-01 NOTE — TELEPHONE ENCOUNTER
Please inform patient that thyroid US revealed small nodules on either side; none of these nodules meet criteria for biopsy or surveillance.

## 2022-11-21 PROBLEM — Z00.00 WELLNESS EXAMINATION: Status: RESOLVED | Noted: 2022-08-16 | Resolved: 2022-11-21

## 2022-12-10 ENCOUNTER — HOSPITAL ENCOUNTER (EMERGENCY)
Facility: HOSPITAL | Age: 62
Discharge: HOME | End: 2022-12-10
Attending: EMERGENCY MEDICINE
Payer: MEDICARE

## 2022-12-10 VITALS
RESPIRATION RATE: 16 BRPM | HEIGHT: 67 IN | WEIGHT: 137.88 LBS | BODY MASS INDEX: 21.64 KG/M2 | DIASTOLIC BLOOD PRESSURE: 76 MMHG | TEMPERATURE: 100 F | HEART RATE: 86 BPM | SYSTOLIC BLOOD PRESSURE: 116 MMHG | OXYGEN SATURATION: 99 %

## 2022-12-10 DIAGNOSIS — J10.1 INFLUENZA A: Primary | ICD-10-CM

## 2022-12-10 LAB
FLUAV AG UPPER RESP QL IA.RAPID: DETECTED
FLUBV AG UPPER RESP QL IA.RAPID: NOT DETECTED
SARS-COV-2 RNA RESP QL NAA+PROBE: NOT DETECTED

## 2022-12-10 PROCEDURE — 99284 EMERGENCY DEPT VISIT MOD MDM: CPT

## 2022-12-10 PROCEDURE — 25000003 PHARM REV CODE 250: Performed by: EMERGENCY MEDICINE

## 2022-12-10 PROCEDURE — 0240U COVID/FLU A&B PCR: CPT | Performed by: EMERGENCY MEDICINE

## 2022-12-10 RX ORDER — OSELTAMIVIR PHOSPHATE 75 MG/1
75 CAPSULE ORAL 2 TIMES DAILY
Qty: 9 CAPSULE | Refills: 0 | Status: SHIPPED | OUTPATIENT
Start: 2022-12-10 | End: 2022-12-15

## 2022-12-10 RX ORDER — OSELTAMIVIR PHOSPHATE 75 MG/1
75 CAPSULE ORAL
Status: COMPLETED | OUTPATIENT
Start: 2022-12-10 | End: 2022-12-10

## 2022-12-10 RX ORDER — IBUPROFEN 600 MG/1
600 TABLET ORAL
Status: COMPLETED | OUTPATIENT
Start: 2022-12-10 | End: 2022-12-10

## 2022-12-10 RX ORDER — OSELTAMIVIR PHOSPHATE 75 MG/1
75 CAPSULE ORAL 2 TIMES DAILY
Qty: 9 CAPSULE | Refills: 0 | Status: SHIPPED | OUTPATIENT
Start: 2022-12-10 | End: 2022-12-10 | Stop reason: SDUPTHER

## 2022-12-10 RX ORDER — PROMETHAZINE HYDROCHLORIDE AND DEXTROMETHORPHAN HYDROBROMIDE 6.25; 15 MG/5ML; MG/5ML
5 SYRUP ORAL 4 TIMES DAILY PRN
Qty: 120 ML | Refills: 1 | Status: SHIPPED | OUTPATIENT
Start: 2022-12-10 | End: 2022-12-20

## 2022-12-10 RX ORDER — ACETAMINOPHEN 500 MG
1000 TABLET ORAL
Status: COMPLETED | OUTPATIENT
Start: 2022-12-10 | End: 2022-12-10

## 2022-12-10 RX ORDER — PROMETHAZINE HYDROCHLORIDE AND DEXTROMETHORPHAN HYDROBROMIDE 6.25; 15 MG/5ML; MG/5ML
5 SYRUP ORAL 4 TIMES DAILY PRN
Qty: 120 ML | Refills: 1 | Status: SHIPPED | OUTPATIENT
Start: 2022-12-10 | End: 2022-12-10 | Stop reason: SDUPTHER

## 2022-12-10 RX ADMIN — ACETAMINOPHEN 1000 MG: 500 TABLET ORAL at 05:12

## 2022-12-10 RX ADMIN — OSELTAMIVIR PHOSPHATE 75 MG: 75 CAPSULE ORAL at 07:12

## 2022-12-10 RX ADMIN — IBUPROFEN 600 MG: 600 TABLET, FILM COATED ORAL at 05:12

## 2022-12-10 NOTE — ED PROVIDER NOTES
Encounter Date: 12/10/2022       History     Chief Complaint   Patient presents with    Cough    Fever    Headache     States cough, headache, fever starting 2 days ago.       Symptoms for 2 days mild fever, chills, runny nose, cough, body aches, headache.  She has had COVID in the past and the vaccine but has not had influenza vaccination this season.  Will be need a note for work.  No dyspnea, vomiting, or other complaints.    The history is provided by the patient. No  was used.   Review of patient's allergies indicates:   Allergen Reactions    Hydrochlorothiazide      Other reaction(s): rash  Rash    Hydrobenzthiazide Rash    Sulfa (sulfonamide antibiotics) Rash    Sulfur Rash     Past Medical History:   Diagnosis Date    Bradycardia     Chronic back pain     Essential (primary) hypertension     Hypothyroidism, unspecified     Menopause     Microscopic hematuria     Nontoxic single thyroid nodule     Sleep disorder not due to a substance or known physiological condition, unspecified      History reviewed. No pertinent surgical history.  Family History   Problem Relation Age of Onset    Diabetes type II Mother     Hypertension Mother     Hypertension Father      Social History     Tobacco Use    Smoking status: Never    Smokeless tobacco: Never   Substance Use Topics    Alcohol use: Never    Drug use: Never     Review of Systems   Constitutional:  Positive for chills and fever. Negative for activity change and fatigue.   HENT:  Positive for ear pain and rhinorrhea. Negative for congestion, facial swelling, nosebleeds, sinus pressure and sore throat.    Eyes:  Negative for pain, discharge, redness and visual disturbance.   Respiratory:  Positive for cough. Negative for choking, chest tightness, shortness of breath and wheezing.    Cardiovascular:  Negative for chest pain, palpitations and leg swelling.   Gastrointestinal:  Negative for abdominal distention, abdominal pain, nausea and vomiting.    Endocrine: Negative for heat intolerance, polydipsia and polyuria.   Genitourinary:  Negative for difficulty urinating, dysuria, flank pain, hematuria and urgency.   Musculoskeletal:  Negative for back pain, gait problem, joint swelling and myalgias.   Skin:  Negative for color change and rash.   Allergic/Immunologic: Negative for environmental allergies and food allergies.   Neurological:  Negative for dizziness, weakness, numbness and headaches.   Hematological:  Negative for adenopathy. Does not bruise/bleed easily.   Psychiatric/Behavioral:  Negative for agitation and behavioral problems. The patient is not nervous/anxious.    All other systems reviewed and are negative.    Physical Exam     Initial Vitals [12/10/22 0520]   BP Pulse Resp Temp SpO2   116/76 86 17 100 °F (37.8 °C) 99 %      MAP       --         Physical Exam    Nursing note and vitals reviewed.  Constitutional: She appears well-developed and well-nourished. She is not diaphoretic. No distress.   HENT:   Head: Normocephalic and atraumatic.   Mouth/Throat: No oropharyngeal exudate.   Eyes: Conjunctivae and EOM are normal. Pupils are equal, round, and reactive to light. Right eye exhibits no discharge. Left eye exhibits no discharge. No scleral icterus.   Neck: Neck supple. No thyromegaly present. No tracheal deviation present. No JVD present.   Normal range of motion.  Cardiovascular:  Normal rate, regular rhythm, normal heart sounds and intact distal pulses.     Exam reveals no gallop and no friction rub.       No murmur heard.  Pulmonary/Chest: Breath sounds normal. No stridor. No respiratory distress. She has no wheezes. She has no rhonchi. She has no rales. She exhibits no tenderness.   Abdominal: Abdomen is soft. Bowel sounds are normal. She exhibits no distension and no mass. There is no abdominal tenderness. There is no rebound and no guarding.   Musculoskeletal:         General: No tenderness or edema. Normal range of motion.      Cervical  back: Normal range of motion and neck supple.     Neurological: She is alert and oriented to person, place, and time. She has normal strength.   Skin: Skin is warm and dry. No rash and no abscess noted. No erythema.   Psychiatric: She has a normal mood and affect. Her behavior is normal. Judgment and thought content normal.       ED Course   Procedures  Labs Reviewed   COVID/FLU A&B PCR - Abnormal; Notable for the following components:       Result Value    Influenza A PCR Detected (*)     All other components within normal limits    Narrative:     The Xpert Xpress SARS-CoV-2/FLU/RSV plus is a rapid, multiplexed real-time PCR test intended for the simultaneous qualitative detection and differentiation of SARS-CoV-2, Influenza A, Influenza B, and respiratory syncytial virus (RSV) viral RNA in either nasopharyngeal swab or nasal swab specimens.                Imaging Results    None          Medications   oseltamivir capsule 75 mg (has no administration in time range)   acetaminophen tablet 1,000 mg (1,000 mg Oral Given 12/10/22 0541)   ibuprofen tablet 600 mg (600 mg Oral Given 12/10/22 0541)     6:47 AM Stable/ counseled in detail.                             Clinical Impression:   Final diagnoses:  [J10.1] Influenza A (Primary)        ED Disposition Condition    Discharge Stable          ED Prescriptions       Medication Sig Dispense Start Date End Date Auth. Provider    oseltamivir (TAMIFLU) 75 MG capsule Take 1 capsule (75 mg total) by mouth 2 (two) times daily. for 5 days 9 capsule 12/10/2022 12/15/2022 Sheldon Ross MD    promethazine-dextromethorphan (PROMETHAZINE-DM) 6.25-15 mg/5 mL Syrp Take 5 mLs by mouth 4 (four) times daily as needed (cough). 120 mL 12/10/2022 12/20/2022 Sheldon Ross MD          Follow-up Information       Follow up With Specialties Details Why Contact Info    [facility]  As needed [address] [facility] [place] [address] [phone]      [facility] Emergency Medicine  As needed [address] [facility] [address]  [phone]             Sheldon Ross MD  12/10/22 0702

## 2022-12-10 NOTE — Clinical Note
"Julianne"Bryan Parker was seen and treated in our emergency department on 12/10/2022.  She may return to work on 12/16/2022.       If you have any questions or concerns, please don't hesitate to call.      Sheldon Ross MD"

## 2022-12-11 ENCOUNTER — HOSPITAL ENCOUNTER (EMERGENCY)
Facility: HOSPITAL | Age: 62
Discharge: HOME | End: 2022-12-11
Attending: INTERNAL MEDICINE
Payer: MEDICARE

## 2022-12-11 VITALS
DIASTOLIC BLOOD PRESSURE: 67 MMHG | WEIGHT: 134.5 LBS | HEART RATE: 59 BPM | BODY MASS INDEX: 21.11 KG/M2 | HEIGHT: 67 IN | RESPIRATION RATE: 21 BRPM | TEMPERATURE: 100 F | SYSTOLIC BLOOD PRESSURE: 130 MMHG | OXYGEN SATURATION: 98 %

## 2022-12-11 DIAGNOSIS — I95.1 ORTHOSTATIC HYPOTENSION: Primary | ICD-10-CM

## 2022-12-11 DIAGNOSIS — J10.1 INFLUENZA A: ICD-10-CM

## 2022-12-11 LAB
ALBUMIN SERPL-MCNC: 4 GM/DL (ref 3.4–4.8)
ALBUMIN/GLOB SERPL: 1.1 RATIO (ref 1.1–2)
ALP SERPL-CCNC: 79 UNIT/L (ref 40–150)
ALT SERPL-CCNC: 101 UNIT/L (ref 0–55)
APPEARANCE UR: ABNORMAL
AST SERPL-CCNC: 119 UNIT/L (ref 5–34)
BACTERIA #/AREA URNS AUTO: ABNORMAL /HPF
BASOPHILS # BLD AUTO: 0.01 X10(3)/MCL (ref 0–0.2)
BASOPHILS NFR BLD AUTO: 0.3 %
BILIRUB UR QL STRIP.AUTO: NEGATIVE MG/DL
BILIRUBIN DIRECT+TOT PNL SERPL-MCNC: 0.3 MG/DL
BUN SERPL-MCNC: 8.9 MG/DL (ref 9.8–20.1)
CALCIUM SERPL-MCNC: 9.2 MG/DL (ref 8.4–10.2)
CHLORIDE SERPL-SCNC: 106 MMOL/L (ref 98–107)
CK MB SERPL-MCNC: 0.4 NG/ML
CK SERPL-CCNC: 381 U/L (ref 29–168)
CO2 SERPL-SCNC: 22 MMOL/L (ref 23–31)
COLOR UR AUTO: YELLOW
CREAT SERPL-MCNC: 0.93 MG/DL (ref 0.55–1.02)
EOSINOPHIL # BLD AUTO: 0 X10(3)/MCL (ref 0–0.9)
EOSINOPHIL NFR BLD AUTO: 0 %
ERYTHROCYTE [DISTWIDTH] IN BLOOD BY AUTOMATED COUNT: 13.4 % (ref 11.5–17)
GFR SERPLBLD CREATININE-BSD FMLA CKD-EPI: >60 MLS/MIN/1.73/M2
GLOBULIN SER-MCNC: 3.7 GM/DL (ref 2.4–3.5)
GLUCOSE SERPL-MCNC: 127 MG/DL (ref 82–115)
GLUCOSE UR QL STRIP.AUTO: NORMAL MG/DL
HCT VFR BLD AUTO: 36.6 % (ref 37–47)
HGB BLD-MCNC: 12 GM/DL (ref 12–16)
HYALINE CASTS #/AREA URNS LPF: ABNORMAL /LPF
IMM GRANULOCYTES # BLD AUTO: 0 X10(3)/MCL (ref 0–0.04)
IMM GRANULOCYTES NFR BLD AUTO: 0 %
KETONES UR QL STRIP.AUTO: ABNORMAL MG/DL
LEUKOCYTE ESTERASE UR QL STRIP.AUTO: 25 UNIT/L
LYMPHOCYTES # BLD AUTO: 0.52 X10(3)/MCL (ref 0.6–4.6)
LYMPHOCYTES NFR BLD AUTO: 15 %
MAGNESIUM SERPL-MCNC: 2.2 MG/DL (ref 1.6–2.6)
MCH RBC QN AUTO: 28.8 PG (ref 27–31)
MCHC RBC AUTO-ENTMCNC: 32.8 MG/DL (ref 33–36)
MCV RBC AUTO: 88 FL (ref 80–94)
MONOCYTES # BLD AUTO: 0.33 X10(3)/MCL (ref 0.1–1.3)
MONOCYTES NFR BLD AUTO: 9.5 %
MUCOUS THREADS URNS QL MICRO: ABNORMAL /LPF
NEUTROPHILS # BLD AUTO: 2.6 X10(3)/MCL (ref 2.1–9.2)
NEUTROPHILS NFR BLD AUTO: 75.2 %
NITRITE UR QL STRIP.AUTO: NEGATIVE
NRBC BLD AUTO-RTO: 0 %
PH UR STRIP.AUTO: 5.5 [PH]
PLATELET # BLD AUTO: 203 X10(3)/MCL (ref 130–400)
PMV BLD AUTO: 9 FL (ref 7.4–10.4)
POCT GLUCOSE: 136 MG/DL (ref 70–110)
POTASSIUM SERPL-SCNC: 3.3 MMOL/L (ref 3.5–5.1)
PROT SERPL-MCNC: 7.7 GM/DL (ref 5.8–7.6)
PROT UR QL STRIP.AUTO: ABNORMAL MG/DL
RBC # BLD AUTO: 4.16 X10(6)/MCL (ref 4.2–5.4)
RBC #/AREA URNS AUTO: ABNORMAL /HPF
RBC UR QL AUTO: ABNORMAL UNIT/L
SODIUM SERPL-SCNC: 139 MMOL/L (ref 136–145)
SP GR UR STRIP.AUTO: 1.02
SQUAMOUS #/AREA URNS LPF: ABNORMAL /HPF
TROPONIN I SERPL-MCNC: 0.02 NG/ML (ref 0–0.04)
UROBILINOGEN UR STRIP-ACNC: NORMAL MG/DL
WBC # SPEC AUTO: 3.5 X10(3)/MCL (ref 4.5–11.5)
WBC #/AREA URNS AUTO: ABNORMAL /HPF

## 2022-12-11 PROCEDURE — 96360 HYDRATION IV INFUSION INIT: CPT

## 2022-12-11 PROCEDURE — 82550 ASSAY OF CK (CPK): CPT | Performed by: PHYSICIAN ASSISTANT

## 2022-12-11 PROCEDURE — 80053 COMPREHEN METABOLIC PANEL: CPT | Performed by: PHYSICIAN ASSISTANT

## 2022-12-11 PROCEDURE — 83735 ASSAY OF MAGNESIUM: CPT | Performed by: PHYSICIAN ASSISTANT

## 2022-12-11 PROCEDURE — 82962 GLUCOSE BLOOD TEST: CPT

## 2022-12-11 PROCEDURE — 82553 CREATINE MB FRACTION: CPT | Performed by: PHYSICIAN ASSISTANT

## 2022-12-11 PROCEDURE — 81001 URINALYSIS AUTO W/SCOPE: CPT | Performed by: PHYSICIAN ASSISTANT

## 2022-12-11 PROCEDURE — 93005 ELECTROCARDIOGRAM TRACING: CPT

## 2022-12-11 PROCEDURE — 84484 ASSAY OF TROPONIN QUANT: CPT | Performed by: PHYSICIAN ASSISTANT

## 2022-12-11 PROCEDURE — 99285 EMERGENCY DEPT VISIT HI MDM: CPT | Mod: 25

## 2022-12-11 PROCEDURE — 25000003 PHARM REV CODE 250: Performed by: PHYSICIAN ASSISTANT

## 2022-12-11 PROCEDURE — 85025 COMPLETE CBC W/AUTO DIFF WBC: CPT | Performed by: PHYSICIAN ASSISTANT

## 2022-12-11 RX ORDER — SODIUM CHLORIDE 9 MG/ML
1000 INJECTION, SOLUTION INTRAVENOUS
Status: COMPLETED | OUTPATIENT
Start: 2022-12-11 | End: 2022-12-11

## 2022-12-11 RX ADMIN — SODIUM CHLORIDE 1000 ML: 9 INJECTION, SOLUTION INTRAVENOUS at 10:12

## 2022-12-11 NOTE — ED PROVIDER NOTES
Encounter Date: 12/11/2022     History     Chief Complaint   Patient presents with    Loss of Consciousness     PT REPORTS SYNCOPE EPISODE THIS AM AFTER GETTING OUT OF BED THIS AM.  PT DX W FLU YESTERDAY.  VSS.  DENIES CP/SOB. . EKG OBTAINED.       Patient with pmhx of HTN, sinus bradycardia, and hypothyroidism presents today for evaluation after a reported syncopal episode that happened this morning after getting out of bed. Episode was witnessed by . He says she got out of bed to get something out of the drawer and after standing up for a few seconds she slowly fell over. She landed on her bottom in a sitting up position. She did not lose consciousness or sustain any head trauma. Patient was dx with flu A yesterday. She has been taking tamiflu and promethazine-dm that she was prescribed yesterday.  says she had a rough night due to coughing. She did take her blood pressure medication this morning, but it was after this episode. Patient says at present time she feels fine. She denies head trauma, LOC, headache, nausea, vomiting, diarrhea, decreased oral intake, cp, sob, use of anticoagulation.    The history is provided by the patient. No  was used.   Review of patient's allergies indicates:   Allergen Reactions    Hydrochlorothiazide      Other reaction(s): rash  Rash    Hydrobenzthiazide Rash    Sulfa (sulfonamide antibiotics) Rash    Sulfur Rash     Past Medical History:   Diagnosis Date    Bradycardia     Chronic back pain     Essential (primary) hypertension     Hypothyroidism, unspecified     Menopause     Microscopic hematuria     Nontoxic single thyroid nodule     Sleep disorder not due to a substance or known physiological condition, unspecified      No past surgical history on file.  Family History   Problem Relation Age of Onset    Diabetes type II Mother     Hypertension Mother     Hypertension Father      Social History     Tobacco Use    Smoking status: Never     Smokeless tobacco: Never   Substance Use Topics    Alcohol use: Never    Drug use: Never     Review of Systems   Constitutional:  Positive for fatigue. Negative for chills and fever.   HENT:  Positive for congestion.    Respiratory:  Positive for cough. Negative for chest tightness and shortness of breath.    Cardiovascular:  Negative for chest pain, palpitations and leg swelling.   Gastrointestinal:  Negative for abdominal pain, constipation, diarrhea, nausea and vomiting.   Genitourinary:  Negative for dysuria, flank pain and hematuria.   Musculoskeletal:  Negative for arthralgias, back pain, gait problem, joint swelling, myalgias, neck pain and neck stiffness.   Skin:  Negative for rash.   Neurological:  Negative for dizziness, tremors, seizures, syncope, facial asymmetry, speech difficulty, weakness, light-headedness, numbness and headaches.   All other systems reviewed and are negative.    Physical Exam     Initial Vitals [12/11/22 0919]   BP Pulse Resp Temp SpO2   108/68 70 16 99.9 °F (37.7 °C) 100 %      MAP       --         Physical Exam    Vitals reviewed.  Constitutional: She appears well-developed and well-nourished. She is not diaphoretic. No distress.   HENT:   Head: Normocephalic and atraumatic.   Mouth/Throat: Oropharynx is clear and moist. No oropharyngeal exudate.   Eyes: Conjunctivae and EOM are normal. Pupils are equal, round, and reactive to light. No scleral icterus.   Neck: Neck supple.   Normal range of motion.  Cardiovascular:  Normal rate, regular rhythm, normal heart sounds and intact distal pulses.           No murmur heard.  Pulmonary/Chest: Breath sounds normal. No respiratory distress. She has no wheezes. She has no rhonchi. She has no rales. She exhibits no tenderness.   Abdominal: Abdomen is soft. Bowel sounds are normal. She exhibits no distension. There is no abdominal tenderness. There is no rebound and no guarding.   Musculoskeletal:         General: No edema.      Cervical  back: Normal range of motion and neck supple.     Neurological: She is alert and oriented to person, place, and time. GCS score is 15. GCS eye subscore is 4. GCS verbal subscore is 5. GCS motor subscore is 6.   Skin: Skin is warm and dry. Capillary refill takes less than 2 seconds. No rash noted.   Psychiatric: She has a normal mood and affect.       ED Course   Procedures  Labs Reviewed   URINALYSIS, REFLEX TO URINE CULTURE - Abnormal; Notable for the following components:       Result Value    Appearance, UA Turbid (*)     Protein, UA 1+ (*)     Ketones, UA Trace (*)     Blood, UA 3+ (*)     Leukocyte Esterase, UA 25 (*)     Bacteria, UA Occ (*)     Squamous Epithelial Cells, UA Moderate (*)     Mucous, UA Moderate (*)     All other components within normal limits   COMPREHENSIVE METABOLIC PANEL - Abnormal; Notable for the following components:    Potassium Level 3.3 (*)     Carbon Dioxide 22 (*)     Glucose Level 127 (*)     Blood Urea Nitrogen 8.9 (*)     Protein Total 7.7 (*)     Globulin 3.7 (*)     Alanine Aminotransferase 101 (*)     Aspartate Aminotransferase 119 (*)     All other components within normal limits   CK - Abnormal; Notable for the following components:    Creatine Kinase 381 (*)     All other components within normal limits   CBC WITH DIFFERENTIAL - Abnormal; Notable for the following components:    WBC 3.5 (*)     RBC 4.16 (*)     Hct 36.6 (*)     MCHC 32.8 (*)     Lymph # 0.52 (*)     All other components within normal limits   POCT GLUCOSE - Abnormal; Notable for the following components:    POCT Glucose 136 (*)     All other components within normal limits   MAGNESIUM - Normal   TROPONIN I - Normal   CK-MB - Normal   CBC W/ AUTO DIFFERENTIAL    Narrative:     The following orders were created for panel order CBC auto differential.  Procedure                               Abnormality         Status                     ---------                               -----------         ------                      CBC with Differential[[id number]]        Abnormal            Final result                 Please view results for these tests on the individual orders.   EXTRA TUBES    Narrative:     The following orders were created for panel order EXTRA TUBES.  Procedure                               Abnormality         Status                     ---------                               -----------         ------                     Light Blue Top Hold[[id number]]                              In process                 Red Top Hold[[id number]]                                     In process                 Gold Top Hold[[id number]]                                    In process                   Please view results for these tests on the individual orders.   LIGHT BLUE TOP HOLD   RED TOP HOLD   GOLD TOP HOLD        ECG Results              EKG 12-lead (Syncope) Age >50 (In process)  Result time 12/11/22 09:23:17      In process by Interface, Lab In [facility] (12/11/22 09:23:17)                   Narrative:    Test Reason : R55,    Vent. Rate : 071 BPM     Atrial Rate : 071 BPM     P-R Int : 136 ms          QRS Dur : 082 ms      QT Int : 368 ms       P-R-T Axes : 076 058 073 degrees     QTc Int : 399 ms    Normal sinus rhythm  Normal ECG  No previous ECGs available    Referred By: [email or web address]   SELF           Confirmed By:                                   Imaging Results              X-Ray Chest PA And Lateral (Final result)  Result time 12/11/22 10:02:15      Final result by Matt Avalos MD (12/11/22 10:02:15)                   Impression:      No acute cardiopulmonary process identified.      Electronically signed by: Matt Avalos  Date:    12/11/2022  Time:    10:02               Narrative:    EXAMINATION:  XR CHEST PA AND LATERAL    CLINICAL HISTORY:  syncope;    TECHNIQUE:  Two-view    COMPARISON:  December 1, 2021.    FINDINGS:  Cardiopericardial silhouette is within normal limits. Lungs are without dense focal or  segmental consolidation, congestion, pleural effusion or pneumothorax.                                       Medications   0.9%  NaCl infusion (0 mLs Intravenous Stopped 12/11/22 1133)                 ED Course as of 12/11/22 1152   Sun Dec 11, 2022   1130 Patient is orthostatic positive. Systolic pressure dropped more than 20 points from supine to standing position.  [SA]   1138 Patient re-evaluated at this time. She is resting comfortably in exam room and denies any acute complaints. Labs, EKG, and imaging reviewed. All labs discussed with patient. She was informed that her ast/alt level were elevated. She denies any abdominal pain, nausea, vomiting, jaundice, frequent tylenol use, etoh abuse. I advised her to f/u with her pcp for further evaluation. She understands and says she has an appt with her coming up. Also recommended drinking plenty of fluids at home and supportive measures for the flu. She is stable for discharge. ED precautions given.  [SA]      ED Course User Index  [SA] BALDEMAR Garces             Clinical Impression:   Final diagnoses:  [I95.1] Orthostatic hypotension (Primary)  [J10.1] Influenza A        ED Disposition Condition    Discharge Stable          ED Prescriptions    None       Follow-up Information       Follow up With Specialties Details Why Contact Info Additional Information    [facility] Emergency Dept Emergency Medicine  If symptoms worsen return to ED immediately [address] [facility] [address] [phone]     [facility] Internal Medicine In 2 days  [address] [facility] [address] [phone] Internal Medicine Clinic Entrance #1    Primary Care Provider within 1-2 days  Go in 2 days                BALDEMAR Garces  12/11/22 115

## 2022-12-14 ENCOUNTER — TELEPHONE (OUTPATIENT)
Dept: INTERNAL MEDICINE | Facility: CLINIC | Age: 62
End: 2022-12-14
Payer: MEDICARE

## 2022-12-14 NOTE — TELEPHONE ENCOUNTER
Patient called wants to speak to you about taking some OTC viatmins, is requesting a call back. Please advise.

## 2022-12-14 NOTE — TELEPHONE ENCOUNTER
Please call and ask what concerns she has? We are currently in clinic and unable to make a call. If she needs an appt, schedule next available.

## 2023-02-06 ENCOUNTER — LAB VISIT (OUTPATIENT)
Dept: LAB | Facility: HOSPITAL | Age: 63
End: 2023-02-06
Attending: NURSE PRACTITIONER
Payer: MEDICARE

## 2023-02-06 DIAGNOSIS — E78.00 HIGH CHOLESTEROL: ICD-10-CM

## 2023-02-06 DIAGNOSIS — E03.9 HYPOTHYROIDISM, UNSPECIFIED TYPE: ICD-10-CM

## 2023-02-06 DIAGNOSIS — I10 HYPERTENSION, UNSPECIFIED TYPE: ICD-10-CM

## 2023-02-06 LAB
ALBUMIN SERPL-MCNC: 4 G/DL (ref 3.4–4.8)
ALBUMIN/GLOB SERPL: 1.3 RATIO (ref 1.1–2)
ALP SERPL-CCNC: 72 UNIT/L (ref 40–150)
ALT SERPL-CCNC: 25 UNIT/L (ref 0–55)
APPEARANCE UR: CLEAR
AST SERPL-CCNC: 27 UNIT/L (ref 5–34)
BACTERIA #/AREA URNS AUTO: ABNORMAL /HPF
BASOPHILS # BLD AUTO: 0.03 X10(3)/MCL (ref 0–0.2)
BASOPHILS NFR BLD AUTO: 1 %
BILIRUB UR QL STRIP.AUTO: NEGATIVE MG/DL
BILIRUBIN DIRECT+TOT PNL SERPL-MCNC: 0.3 MG/DL
BUN SERPL-MCNC: 12 MG/DL (ref 9.8–20.1)
CALCIUM SERPL-MCNC: 9.5 MG/DL (ref 8.4–10.2)
CHLORIDE SERPL-SCNC: 106 MMOL/L (ref 98–107)
CHOLEST SERPL-MCNC: 211 MG/DL
CHOLEST/HDLC SERPL: 3 {RATIO} (ref 0–5)
CO2 SERPL-SCNC: 25 MMOL/L (ref 23–31)
COLOR UR AUTO: ABNORMAL
CREAT SERPL-MCNC: 0.75 MG/DL (ref 0.55–1.02)
EOSINOPHIL # BLD AUTO: 0.15 X10(3)/MCL (ref 0–0.9)
EOSINOPHIL NFR BLD AUTO: 5.2 %
ERYTHROCYTE [DISTWIDTH] IN BLOOD BY AUTOMATED COUNT: 13.6 % (ref 11.5–17)
GFR SERPLBLD CREATININE-BSD FMLA CKD-EPI: >60 MLS/MIN/1.73/M2
GLOBULIN SER-MCNC: 3.2 GM/DL (ref 2.4–3.5)
GLUCOSE SERPL-MCNC: 81 MG/DL (ref 82–115)
GLUCOSE UR QL STRIP.AUTO: NORMAL MG/DL
HCT VFR BLD AUTO: 35.9 % (ref 37–47)
HDLC SERPL-MCNC: 73 MG/DL (ref 35–60)
HGB BLD-MCNC: 11.4 GM/DL (ref 12–16)
HYALINE CASTS #/AREA URNS LPF: ABNORMAL /LPF
IMM GRANULOCYTES # BLD AUTO: 0.01 X10(3)/MCL (ref 0–0.04)
IMM GRANULOCYTES NFR BLD AUTO: 0.3 %
KETONES UR QL STRIP.AUTO: NEGATIVE MG/DL
LDLC SERPL CALC-MCNC: 129 MG/DL (ref 50–140)
LEUKOCYTE ESTERASE UR QL STRIP.AUTO: NEGATIVE UNIT/L
LYMPHOCYTES # BLD AUTO: 0.99 X10(3)/MCL (ref 0.6–4.6)
LYMPHOCYTES NFR BLD AUTO: 34.3 %
MCH RBC QN AUTO: 28.5 PG
MCHC RBC AUTO-ENTMCNC: 31.8 MG/DL (ref 33–36)
MCV RBC AUTO: 89.8 FL (ref 80–94)
MONOCYTES # BLD AUTO: 0.28 X10(3)/MCL (ref 0.1–1.3)
MONOCYTES NFR BLD AUTO: 9.7 %
NEUTROPHILS # BLD AUTO: 1.43 X10(3)/MCL (ref 2.1–9.2)
NEUTROPHILS NFR BLD AUTO: 49.5 %
NITRITE UR QL STRIP.AUTO: NEGATIVE
NRBC BLD AUTO-RTO: 0 %
PH UR STRIP.AUTO: 6.5 [PH]
PLATELET # BLD AUTO: 201 X10(3)/MCL (ref 130–400)
PMV BLD AUTO: 9 FL (ref 7.4–10.4)
POTASSIUM SERPL-SCNC: 4 MMOL/L (ref 3.5–5.1)
PROT SERPL-MCNC: 7.2 GM/DL (ref 5.8–7.6)
PROT UR QL STRIP.AUTO: NEGATIVE MG/DL
RBC # BLD AUTO: 4 X10(6)/MCL (ref 4.2–5.4)
RBC #/AREA URNS AUTO: ABNORMAL /HPF
RBC UR QL AUTO: ABNORMAL UNIT/L
SODIUM SERPL-SCNC: 141 MMOL/L (ref 136–145)
SP GR UR STRIP.AUTO: 1.01
SQUAMOUS #/AREA URNS LPF: ABNORMAL /HPF
T4 FREE SERPL-MCNC: 0.96 NG/DL (ref 0.7–1.48)
TRIGL SERPL-MCNC: 43 MG/DL (ref 37–140)
TSH SERPL-ACNC: 1.93 UIU/ML (ref 0.35–4.94)
UROBILINOGEN UR STRIP-ACNC: NORMAL MG/DL
VLDLC SERPL CALC-MCNC: 9 MG/DL
WBC # SPEC AUTO: 2.9 X10(3)/MCL (ref 4.5–11.5)
WBC #/AREA URNS AUTO: ABNORMAL /HPF

## 2023-02-06 PROCEDURE — 36415 COLL VENOUS BLD VENIPUNCTURE: CPT

## 2023-02-06 PROCEDURE — 84443 ASSAY THYROID STIM HORMONE: CPT

## 2023-02-06 PROCEDURE — 84439 ASSAY OF FREE THYROXINE: CPT

## 2023-02-06 PROCEDURE — 81001 URINALYSIS AUTO W/SCOPE: CPT

## 2023-02-06 PROCEDURE — 80053 COMPREHEN METABOLIC PANEL: CPT

## 2023-02-06 PROCEDURE — 85025 COMPLETE CBC W/AUTO DIFF WBC: CPT

## 2023-02-06 PROCEDURE — 80061 LIPID PANEL: CPT

## 2023-02-08 ENCOUNTER — OFFICE VISIT (OUTPATIENT)
Dept: INTERNAL MEDICINE | Facility: CLINIC | Age: 63
End: 2023-02-08
Payer: MEDICARE

## 2023-02-08 VITALS
HEART RATE: 60 BPM | HEIGHT: 67 IN | DIASTOLIC BLOOD PRESSURE: 69 MMHG | SYSTOLIC BLOOD PRESSURE: 110 MMHG | WEIGHT: 142 LBS | RESPIRATION RATE: 20 BRPM | TEMPERATURE: 98 F | BODY MASS INDEX: 22.29 KG/M2

## 2023-02-08 DIAGNOSIS — I10 HYPERTENSION, UNSPECIFIED TYPE: ICD-10-CM

## 2023-02-08 DIAGNOSIS — E03.9 HYPOTHYROIDISM, UNSPECIFIED TYPE: ICD-10-CM

## 2023-02-08 DIAGNOSIS — D70.8 OTHER NEUTROPENIA: ICD-10-CM

## 2023-02-08 DIAGNOSIS — E78.00 HIGH CHOLESTEROL: ICD-10-CM

## 2023-02-08 DIAGNOSIS — E55.9 VITAMIN D DEFICIENCY: ICD-10-CM

## 2023-02-08 DIAGNOSIS — Z12.31 ENCOUNTER FOR SCREENING MAMMOGRAM FOR MALIGNANT NEOPLASM OF BREAST: ICD-10-CM

## 2023-02-08 DIAGNOSIS — Z12.11 SCREENING FOR COLON CANCER: ICD-10-CM

## 2023-02-08 DIAGNOSIS — D17.1 LIPOMA OF TORSO: ICD-10-CM

## 2023-02-08 DIAGNOSIS — Z78.0 ASYMPTOMATIC POSTMENOPAUSAL STATE: ICD-10-CM

## 2023-02-08 DIAGNOSIS — R31.29 MICROSCOPIC HEMATURIA: Primary | ICD-10-CM

## 2023-02-08 PROBLEM — M54.50 ACUTE BILATERAL LOW BACK PAIN WITHOUT SCIATICA: Status: RESOLVED | Noted: 2022-09-14 | Resolved: 2023-02-08

## 2023-02-08 PROCEDURE — 1101F PR PT FALLS ASSESS DOC 0-1 FALLS W/OUT INJ PAST YR: ICD-10-PCS | Mod: CPTII,,, | Performed by: NURSE PRACTITIONER

## 2023-02-08 PROCEDURE — 3288F PR FALLS RISK ASSESSMENT DOCUMENTED: ICD-10-PCS | Mod: CPTII,,, | Performed by: NURSE PRACTITIONER

## 2023-02-08 PROCEDURE — 1159F PR MEDICATION LIST DOCUMENTED IN MEDICAL RECORD: ICD-10-PCS | Mod: CPTII,,, | Performed by: NURSE PRACTITIONER

## 2023-02-08 PROCEDURE — 99214 PR OFFICE/OUTPT VISIT, EST, LEVL IV, 30-39 MIN: ICD-10-PCS | Mod: S$PBB,,, | Performed by: NURSE PRACTITIONER

## 2023-02-08 PROCEDURE — 3074F PR MOST RECENT SYSTOLIC BLOOD PRESSURE < 130 MM HG: ICD-10-PCS | Mod: CPTII,,, | Performed by: NURSE PRACTITIONER

## 2023-02-08 PROCEDURE — 4010F ACE/ARB THERAPY RXD/TAKEN: CPT | Mod: CPTII,,, | Performed by: NURSE PRACTITIONER

## 2023-02-08 PROCEDURE — 3078F PR MOST RECENT DIASTOLIC BLOOD PRESSURE < 80 MM HG: ICD-10-PCS | Mod: CPTII,,, | Performed by: NURSE PRACTITIONER

## 2023-02-08 PROCEDURE — 4010F PR ACE/ARB THEARPY RXD/TAKEN: ICD-10-PCS | Mod: CPTII,,, | Performed by: NURSE PRACTITIONER

## 2023-02-08 PROCEDURE — 99215 OFFICE O/P EST HI 40 MIN: CPT | Mod: PBBFAC | Performed by: NURSE PRACTITIONER

## 2023-02-08 PROCEDURE — 99214 OFFICE O/P EST MOD 30 MIN: CPT | Mod: S$PBB,,, | Performed by: NURSE PRACTITIONER

## 2023-02-08 PROCEDURE — 1101F PT FALLS ASSESS-DOCD LE1/YR: CPT | Mod: CPTII,,, | Performed by: NURSE PRACTITIONER

## 2023-02-08 PROCEDURE — 3288F FALL RISK ASSESSMENT DOCD: CPT | Mod: CPTII,,, | Performed by: NURSE PRACTITIONER

## 2023-02-08 PROCEDURE — 1160F PR REVIEW ALL MEDS BY PRESCRIBER/CLIN PHARMACIST DOCUMENTED: ICD-10-PCS | Mod: CPTII,,, | Performed by: NURSE PRACTITIONER

## 2023-02-08 PROCEDURE — 3008F PR BODY MASS INDEX (BMI) DOCUMENTED: ICD-10-PCS | Mod: CPTII,,, | Performed by: NURSE PRACTITIONER

## 2023-02-08 PROCEDURE — 3008F BODY MASS INDEX DOCD: CPT | Mod: CPTII,,, | Performed by: NURSE PRACTITIONER

## 2023-02-08 PROCEDURE — 3074F SYST BP LT 130 MM HG: CPT | Mod: CPTII,,, | Performed by: NURSE PRACTITIONER

## 2023-02-08 PROCEDURE — 1159F MED LIST DOCD IN RCRD: CPT | Mod: CPTII,,, | Performed by: NURSE PRACTITIONER

## 2023-02-08 PROCEDURE — 1160F RVW MEDS BY RX/DR IN RCRD: CPT | Mod: CPTII,,, | Performed by: NURSE PRACTITIONER

## 2023-02-08 PROCEDURE — 3078F DIAST BP <80 MM HG: CPT | Mod: CPTII,,, | Performed by: NURSE PRACTITIONER

## 2023-02-08 RX ORDER — LISINOPRIL 20 MG/1
20 TABLET ORAL DAILY
Qty: 90 TABLET | Refills: 1 | Status: SHIPPED | OUTPATIENT
Start: 2023-02-08 | End: 2023-08-23 | Stop reason: SDUPTHER

## 2023-02-08 RX ORDER — LEVOTHYROXINE SODIUM 75 UG/1
75 TABLET ORAL
Qty: 90 TABLET | Refills: 1 | Status: SHIPPED | OUTPATIENT
Start: 2023-02-08 | End: 2023-08-22 | Stop reason: SDUPTHER

## 2023-02-08 NOTE — ASSESSMENT & PLAN NOTE
Educated on a low-fat, low-cholesterol diet and aerobic exercise (20-30 mins/day x 5 days a week). Encouraged healthy fruits and veggie intake. Increase water intake. Avoid excess ETOH intake and smoking  F/u with Cards

## 2023-02-08 NOTE — PROGRESS NOTES
BERTHA Quispe   [facility]  [address] [facility] [id number]      PATIENT NAME: Julianne Parker  : 1960  DATE: 23  MRN: [id number]      Billing Provider: BERTHA Quispe  Level of Service:   Patient PCP Information       Provider PCP Type    BERTHA Quispe General            Reason for Visit / Chief Complaint: Follow-up (Lab review)       History of Present Illness / Problem Focused Workflow     Julianne Parker presents to the clinic with Follow-up (Lab review)     Initial Visit 18: 58 y.o. AAF presenting to the clinic to re-establish primary care. Previous PCP SOLIS Baez NP. Last OV 2018. PMHx significant for HTN, Hypothyroidism, and Sinus Bradycardia. Bp at goal today. Currently taking Amlodipine 10 mg po daily and Lisinopril 5 mg po daily. TSH 2.700 (2018). Currently taking Levothyroxine 25 mcg po daily. Denies s/s of hypo/hyperthyroidism. Seen by Cardio 2018. Work-up mostly negative for bradycardia. Pt remains asymptomatic. She is to f/u with Cardio PRN. Following Urology for microscopic hematuria. Plan is for cystoscopy in the future. CT Abd/Pelvis was unremarkable. Pt presented to ED 2018 w/ c/o right breast lump. Diagnostic MMG completed. Recommendations were to f/u with right breast US. US needed. She was a NS for US in September. She is requesting appt be rescheduled. Pt does states that she can no longer feel the lump. Denies fever, chills, night sweats, CP, SOB, Abd pain, or any other concerns today.    4/3/19: 58 y.o. AAF with PMHx significant for HTN, Hypothyroidism, and Sinus Bradycardia, presenting for f/u. Bp at goal today. Currently taking Amlodipine 10 mg po daily and Lisinopril 5 mg po daily. TSH 1.570 (WNL). Currently taking Levothyroxine 25 mcg po daily. Denies s/s of hypo/hyperthyroidism. Seen by Cardio 19. HR stable. Remains asymptomatic. F/u with Cardio PRN. Pt was  "contacted by GI lab to scheduled cysto in 12/2018 but she never followed-up. Number to lab provided. Pt states that she will call today to try and schedule procedure. Reports that she had a Sleep Study conducted in 2015. Reviewed results with pt which revealed primary snoring. She did not meet criteria for ILENE. Pt replied "I'm good," and expressed that she's not interested in doing any repeat studies at this time as her symptoms have not changed/worsened. MMG scheduled Tuesday, April 23rd, 2019. Reports that she will return FIT as soon as she can. Denies fever, chills, HA, CP, SOB, abd pain, peripheral swelling, B/B dysfunction, or any other concerns today.    (10/3/19): Pt presenting for 6-mth f/u, Hypothyroidism, and Sinus Bradycardia. Bp at goal today. Currently taking Amlodipine 10 mg po daily and Lisinopril 5 mg po daily. TSH 1.570 (WNL) 2/26/19. Currently taking Levothyroxine 25 mcg po daily. Denies s/s of hypo/hyperthyroidism. New TSH level pending. Pt did not complete labs. She is not fasting so she plans to come to lab on Monday. She is amenable to returning FIT (has refused in the past). Doesn't appear that she's called to schedule cysto for hx of MSH. She was a NS for procedure 7/2019. Will repeat urine studies. She is following Dr. Deleon for hx of chronic back pain. States that she needs an MRI but insurance won't approve it. Wondering if MRI can be ordered here. Informed pt that specific details must be completed/documented before insurance would approve MRI. Will need to request medical records. Denies fever, chills, HA, weakness, dizziness, CP, SOB, abd pain, peripheral swelling, B/B dysfunction, or any other concerns today.     Mammo 4/2019 benign    (6/12/2020): 60 y.o. AAF with a PmHx of HTN, Hypothyroidism, MSH (following Urology), back pain, and Sinus Bradycardia, presenting for routine f/u. Bp at goal today. Currently taking Amlodipine 10 mg po daily and Lisinopril 5 mg po daily. TSH 5.890. " "Currently taking Levothyroxine 25 mcg po daily. Pt endorses cold intolerance (wearing a knit jacket today) and occasional fatigue. Cysto 1/2020 revealed essential hematuria. Discharged from [facility] 2/2019 due to unremarkable work-up. Bradycardia remains but pt is asymptomatic. She denies weakness, dizziness, vision changes, or syncope. No other problems stated.    Health Maintenance:   Colon Ca Screening-FIT negative, 6/3/2020   Breast Ca Screening-Mammo negative, 4/2019  Cervical Ca Screening-5/24/18-  NEGATIVE FOR INTRAEPITHELIAL LESION OR MALIGNANCY. NIL    (12/30/2020): 60 y.o. AAF with a PmHx of HTN, Hypothyroidism, right-sided thyroid nodules, MSH (following Urology), back pain, and Sinus Bradycardia, presenting for routine f/u. Bp at goal today. Currently taking Amlodipine 10 mg po daily and Lisinopril 5 mg po daily. HR remains in 50s. Asymptomatic. Pt referred to ENT earlier this year for thyroid nodules. She was seen in clinic and FNA was planned. After re-review, it was concluded that nodules were likely subcm and plans are to follow with annual US, due 7/2021. She underwent mammo 11/6/2020 that was negative. FIT negative 6/2020. Denies any bloody stools or diarrhea. States bowels move well with drinking coffee. If not drinking coffee, states bowels move "a little bit." This is not new. Asks, "What can I take for that?"     Lab review:   COVID + 11/27/2020   Total WBC count and abs neutrophils decreased. Past peripheral smear revealed WBC morphology WNL.   Hgb slightly decreased   TSH decreased 0.0834, Free T4 upper normal. Denies overt s/s of hyperthyroidism.     She is amenable to receiving the Tdap but not sure about flu or shingles. States will call if she changes her mind.   No other problems stated.    (6/30/2021): 61 y.o. AAF with a PmHx of HTN, Hypothyroidism, right-sided thyroid nodules, MSH (following Urology), back pain, and Sinus Bradycardia, presenting for routine f/u. Bp at goal today. " Currently taking Amlodipine 10 mg po daily and Lisinopril 5 mg po daily. HR 50s. Remains asymptomatic. Previously evaluated by Cards. She is following ENT for multinodular thyroid. Repeat US 7/19/21. She has an appt with ENT 7/8, but plans to re-schedule to after US as she won't be available. Mammo scheduled 11/2021.  Lab review:   Total WBC count decreased. Past peripheral smear revealed WBC morphology WNL.   Total RBC count and Hgb slightly decreased   Vitamin D 28.2   Tota cholesterol 229,    TSH 26.2402; previously 0.0834   HgA1c 5.6%   Abnl UA. No significant growth on urine culture. Previously evaluated by Uro for MSH. S/p Cysto 1/2020.   She has no acute concerns.    (2/17/2022): 62 y.o. AAF with a PmHx of HTN, Hypothyroidism, right-sided thyroid nodules, MSH (following Urology), back pain, and Sinus Bradycardia, presenting for routine f/u. HR remains decreased; currently 40s. Pt denies dizziness, weakness, fatigue, syncope. She was evaluated by Cards in 2019. She had a negative work-up. Was told to f/u PRN. Today, patient has no concerns. Chol 209 (improved from 229 in 6/21) and  (improved from 143 in 6/21). TSH 4.56 (improved from previous 26 in 6/21). Reports compliance with medications. No other concerns today.    Health Maintenance:   Colon Ca Screening-FIT negative, 6/3/2020   Breast Ca Screening-Mammo negative, 11/6/2020   Cervical Ca Screening-1/25/21-  NEGATIVE FOR INTRAEPITHELIAL LESION OR MALIGNANCY. NIL; high risk HPV negative    (8/17/2022): 62 y.o. AAF with a PmHx of HTN, high cholesterol, Hypothyroidism, right-sided thyroid nodules, MSH (following Urology), back pain, and Sinus Bradycardia, presenting for routine f/u. Previously referred to cards for bradycardia. Now following Dr. Almaraz with [facility]. Reports she was taken off of Amlodipine and Lisinopril was titrated to 20 mg po daily. Bp at goal. States scheduled for cardiac testing next month and will f/u with Dr. Almaraz one week  "later for results. Labs reviewed: Total WBC count and abs neutrophil remain chronically subnormal. Last peripheral smear normal. Patho cancelled recently ordered peripheral smear due to not meeting criteria; TSH WNL; Vitamin D slightly decreased. Patient is due for colon cancer screening. She denies any acute concerns.    (9/14/2022): 62 y.o. AAF with a PmHx of HTN, high cholesterol, Hypothyroidism, right-sided thyroid nodules, MSH (following Urology), back pain, and Sinus Bradycardia, presenting for routine ED f/u. She presented to ED on 9/2/22 with c/o generalized headaches and lower back pain. She'd mentioned recently being taken off of Amlodipine per Cards, thinking it was likely contributing. Bp 144/57 in ED. She is taking Lisinopril 30 mg po daily, and Bp was at goal at August appt. She was given Toradol and Methocarbamol in ED. Discharged with Rx for IBU, Fioricet, and Zanaflex. Patient admits that she didn't fill any prescriptions because she's feeling "a lot better." States Toradol was helpful in ED. Back pain is resolved and Headache much improved.     Requesting letter to join aerobics class.    No other concerns.    Today's Visit (2/8/2023): 63 y.o. AAF with a PmHx of HTN, high cholesterol, Hypothyroidism, right-sided thyroid nodules, MSH (following Urology), back pain, and Sinus Bradycardia, presenting for routine f/u. She completed labs which are stable overall. Her total cholesterol is slightly elevated at 211. Total WBC count remains subnormal but stable compared to baseline. She also continues with occult blood in urine. S/p cysto 1/2020 which was negative. Denies gross hematuria. She did visit the ED twice in December d/t flu A, then a subsequent syncopal episode r/t flu. She's completely recovered at this time. She is due for her mammogram next month, colon cancer screening, and DXA. Amenable to all. Declines vaccines.       Review of Systems     Review of Systems   Constitutional: Negative.  " Negative for chills, diaphoresis, fatigue, fever and unexpected weight change.   HENT: Negative.     Eyes: Negative.    Respiratory: Negative.     Cardiovascular: Negative.    Gastrointestinal: Negative.    Endocrine: Negative.    Genitourinary: Negative.    Musculoskeletal: Negative.    Skin: Negative.    Allergic/Immunologic: Negative.    Neurological:  Negative for dizziness and numbness.   Hematological: Negative.    Psychiatric/Behavioral: Negative.       Medical / Social / Family History     Past Medical History:   Diagnosis Date    Bradycardia     Chronic back pain     Essential (primary) hypertension     Hypothyroidism, unspecified     Menopause     Microscopic hematuria     Nontoxic single thyroid nodule     Sleep disorder not due to a substance or known physiological condition, unspecified        History reviewed. No pertinent surgical history.    Social History  Ms.  reports that she has never smoked. She has never used smokeless tobacco. She reports that she does not drink alcohol and does not use drugs.    Family History  Ms.'s family history includes Diabetes type II in her mother; Hypertension in her father and mother.    Medications and Allergies     Medications  Medication List with Changes/Refills   Current Medications    BLOOD PRESSURE MONITOR (BLOOD PRESSURE KIT) KIT    Use daily to assess blood pressure   Changed and/or Refilled Medications    Modified Medication Previous Medication    LEVOTHYROXINE (SYNTHROID) 75 MCG TABLET levothyroxine (SYNTHROID) 75 MCG tablet       Take 1 tablet (75 mcg total) by mouth before breakfast.    Take 1 tablet (75 mcg total) by mouth before breakfast.    LISINOPRIL (PRINIVIL,ZESTRIL) 20 MG TABLET lisinopriL (PRINIVIL,ZESTRIL) 20 MG tablet       Take 1 tablet (20 mg total) by mouth once daily.    Take 20 mg by mouth once daily.   Discontinued Medications    BUTALBITAL-ACETAMINOPHEN-CAFFEINE -40 MG (FIORICET, ESGIC) -40 MG PER TABLET    Take 1 tablet by  mouth every 6 (six) hours as needed for Headaches.       Allergies  Review of patient's allergies indicates:   Allergen Reactions    Hydrochlorothiazide      Other reaction(s): rash  Rash    Hydrobenzthiazide Rash    Sulfa (sulfonamide antibiotics) Rash    Sulfur Rash       Physical Examination     Vitals:    02/08/23 0722   BP: 110/69   Pulse: 60   Resp: 20   Temp: 97.8 °F (36.6 °C)     Physical Exam  Constitutional:       Appearance: Normal appearance.   HENT:      Head: Normocephalic and atraumatic.      Right Ear: Tympanic membrane, ear canal and external ear normal.      Left Ear: Tympanic membrane, ear canal and external ear normal.      Nose: Nose normal.      Mouth/Throat:      Mouth: Mucous membranes are moist.      Pharynx: Oropharynx is clear.   Eyes:      Extraocular Movements: Extraocular movements intact.      Conjunctiva/sclera: Conjunctivae normal.      Pupils: Pupils are equal, round, and reactive to light.   Cardiovascular:      Rate and Rhythm: Normal rate and regular rhythm.      Pulses: Normal pulses.      Heart sounds: Normal heart sounds.   Pulmonary:      Effort: Pulmonary effort is normal.      Breath sounds: Normal breath sounds.   Abdominal:      General: Bowel sounds are normal.      Palpations: Abdomen is soft.   Musculoskeletal:         General: Normal range of motion.      Cervical back: Normal range of motion.      Right lower leg: No edema.      Left lower leg: No edema.      Comments: Golf-ball sized lipoma to left lateral torso. Mobile. Non-tender   Skin:     General: Skin is warm and dry.   Neurological:      General: No focal deficit present.      Mental Status: She is alert and oriented to person, place, and time.   Psychiatric:         Mood and Affect: Mood normal.         Behavior: Behavior normal.         Thought Content: Thought content normal.         Judgment: Judgment normal.         Results     Lab Results   Component Value Date    WBC 2.9 (L) 02/06/2023    RBC 4.00 (L)  02/06/2023    HGB 11.4 (L) 02/06/2023    HCT 35.9 (L) 02/06/2023    MCV 89.8 02/06/2023    MCH 28.5 02/06/2023    MCHC 31.8 (L) 02/06/2023    RDW 13.6 02/06/2023     02/06/2023    MPV 9.0 02/06/2023     CMP  Sodium Level   Date Value Ref Range Status   02/06/2023 141 136 - 145 mmol/L Final     Potassium Level   Date Value Ref Range Status   02/06/2023 4.0 3.5 - 5.1 mmol/L Final     Carbon Dioxide   Date Value Ref Range Status   02/06/2023 25 23 - 31 mmol/L Final     Blood Urea Nitrogen   Date Value Ref Range Status   02/06/2023 12.0 9.8 - 20.1 mg/dL Final     Creatinine   Date Value Ref Range Status   02/06/2023 0.75 0.55 - 1.02 mg/dL Final     Calcium Level Total   Date Value Ref Range Status   02/06/2023 9.5 8.4 - 10.2 mg/dL Final     Albumin Level   Date Value Ref Range Status   02/06/2023 4.0 3.4 - 4.8 g/dL Final     Bilirubin Total   Date Value Ref Range Status   02/06/2023 0.3 <=1.5 mg/dL Final     Alkaline Phosphatase   Date Value Ref Range Status   02/06/2023 72 40 - 150 unit/L Final     Aspartate Aminotransferase   Date Value Ref Range Status   02/06/2023 27 5 - 34 unit/L Final     Alanine Aminotransferase   Date Value Ref Range Status   02/06/2023 25 0 - 55 unit/L Final     Estimated GFR-Non    Date Value Ref Range Status   12/03/2021 88 (L) >>=90 mL/min/1.73 m2 Final     Lab Results   Component Value Date    CHOL 211 (H) 02/06/2023     Lab Results   Component Value Date    HDL 73 (H) 02/06/2023     No results found for: LDLCALC  Lab Results   Component Value Date    TRIG 43 02/06/2023     No results found for: CHOLHDL  Lab Results   Component Value Date    TSH 1.931 02/06/2023     Lab Results   Component Value Date    PHUR 5.5 06/25/2021    PROTEINUA Negative 02/06/2023    GLUCUA Negative 06/25/2021    KETONESU Negative 06/25/2021    OCCULTUA 0.1 06/25/2021    NITRITE Negative 06/25/2021    LEUKOCYTESUR Negative 02/06/2023           Assessment and Plan (including Health Maintenance)      Plan:         Health Maintenance Due   Topic Date Due    DEXA Scan  Never done    Colorectal Cancer Screening  Never done    Mammogram  03/11/2023       Problem List Items Addressed This Visit          Cardiac/Vascular    Hypertension    Overview     Lisinopril 20 mg po daily         Current Assessment & Plan     At goal  Continue ACE-I  DASH         Relevant Medications    lisinopriL (PRINIVIL,ZESTRIL) 20 MG tablet    Other Relevant Orders    Comprehensive Metabolic Panel    CBC Auto Differential    High cholesterol    Current Assessment & Plan     Educated on a low-fat, low-cholesterol diet and aerobic exercise (20-30 mins/day x 5 days a week). Encouraged healthy fruits and veggie intake. Increase water intake. Avoid excess ETOH intake and smoking  F/u with Cards            Renal/    Microscopic hematuria - Primary    Overview     Cysto 1/2020 revealed essential hematuria         Relevant Orders    Ambulatory referral/consult to Urology       Oncology    Other neutropenia    Overview     * Final Report *  RBC, WBC and platelet morphology within normal limits.  Tri Gonzalez MD.  Result type: Perif Smear Eval  Result date: December 28, 2020 8:50 CST  Result status: Auth (Verified)  Performed by: Concha Isaac on January 01, 2021 17:15 CST  Verified by: Tri Gonzalez MD on January 04, 2021 10:58 CST  Encounter info: [id number], [facility], Outpatient, 12/28/2020 - 12/28/2020           Current Assessment & Plan     CBC stable overall  Will check f/u peripheral smear at f/u         Relevant Orders    Path Review, Peripheral Smear    Lipoma of torso    Current Assessment & Plan     Patient relates negative work-up in the past. Denies any acute concerns. Will continue to monitor. Instructed to notify with any changes ASAP            Endocrine    Hypothyroidism    Current Assessment & Plan     TFTs WNL  Continue LT4 75 mcg po daily         Relevant Medications    levothyroxine (SYNTHROID)  75 MCG tablet    Other Relevant Orders    TSH    T4, Free    Vitamin D deficiency    Current Assessment & Plan     Continue Vitamin D3 1,000 - 2,000 IU once daily           Relevant Orders    Vitamin D       GI    Screening for colon cancer    Relevant Orders    Cologuard Screening (Multitarget Stool DNA)     Other Visit Diagnoses       Asymptomatic postmenopausal state        Relevant Orders    DXA Bone Density Spine And Hip    Encounter for screening mammogram for malignant neoplasm of breast        Relevant Orders    Mammo Digital Screening Bilat w/ Migel              Health Maintenance Topics with due status: Not Due       Topic Last Completion Date    TETANUS VACCINE 12/30/2020    Lipid Panel 02/06/2023       Future Appointments   Date Time Provider Department Center   2/17/2023  7:15 AM BERTHA Quispe   8/8/2023  7:15 AM BERTHA Quispe Un            Signature:  BERTHA Quispe  [facility]  [address] [facility] [id number]    Date of encounter: 2/8/23

## 2023-02-08 NOTE — ASSESSMENT & PLAN NOTE
Patient relates negative work-up in the past. Denies any acute concerns. Will continue to monitor. Instructed to notify with any changes ASAP

## 2023-03-06 ENCOUNTER — OFFICE VISIT (OUTPATIENT)
Dept: UROLOGY | Facility: CLINIC | Age: 63
End: 2023-03-06
Payer: MEDICARE

## 2023-03-06 VITALS
DIASTOLIC BLOOD PRESSURE: 66 MMHG | OXYGEN SATURATION: 100 % | TEMPERATURE: 98 F | SYSTOLIC BLOOD PRESSURE: 120 MMHG | HEIGHT: 68 IN | BODY MASS INDEX: 21.73 KG/M2 | HEART RATE: 50 BPM | WEIGHT: 143.38 LBS

## 2023-03-06 DIAGNOSIS — R31.29 MICROSCOPIC HEMATURIA: ICD-10-CM

## 2023-03-06 PROCEDURE — 1160F RVW MEDS BY RX/DR IN RCRD: CPT | Mod: CPTII,,, | Performed by: NURSE PRACTITIONER

## 2023-03-06 PROCEDURE — 99214 PR OFFICE/OUTPT VISIT, EST, LEVL IV, 30-39 MIN: ICD-10-PCS | Mod: S$PBB,,, | Performed by: NURSE PRACTITIONER

## 2023-03-06 PROCEDURE — 99214 OFFICE O/P EST MOD 30 MIN: CPT | Mod: S$PBB,,, | Performed by: NURSE PRACTITIONER

## 2023-03-06 PROCEDURE — 3078F PR MOST RECENT DIASTOLIC BLOOD PRESSURE < 80 MM HG: ICD-10-PCS | Mod: CPTII,,, | Performed by: NURSE PRACTITIONER

## 2023-03-06 PROCEDURE — 1159F PR MEDICATION LIST DOCUMENTED IN MEDICAL RECORD: ICD-10-PCS | Mod: CPTII,,, | Performed by: NURSE PRACTITIONER

## 2023-03-06 PROCEDURE — 1159F MED LIST DOCD IN RCRD: CPT | Mod: CPTII,,, | Performed by: NURSE PRACTITIONER

## 2023-03-06 PROCEDURE — 3008F BODY MASS INDEX DOCD: CPT | Mod: CPTII,,, | Performed by: NURSE PRACTITIONER

## 2023-03-06 PROCEDURE — 88108 CYTOPATH CONCENTRATE TECH: CPT | Performed by: NURSE PRACTITIONER

## 2023-03-06 PROCEDURE — 4010F PR ACE/ARB THEARPY RXD/TAKEN: ICD-10-PCS | Mod: CPTII,,, | Performed by: NURSE PRACTITIONER

## 2023-03-06 PROCEDURE — 3074F SYST BP LT 130 MM HG: CPT | Mod: CPTII,,, | Performed by: NURSE PRACTITIONER

## 2023-03-06 PROCEDURE — 3078F DIAST BP <80 MM HG: CPT | Mod: CPTII,,, | Performed by: NURSE PRACTITIONER

## 2023-03-06 PROCEDURE — 3074F PR MOST RECENT SYSTOLIC BLOOD PRESSURE < 130 MM HG: ICD-10-PCS | Mod: CPTII,,, | Performed by: NURSE PRACTITIONER

## 2023-03-06 PROCEDURE — 4010F ACE/ARB THERAPY RXD/TAKEN: CPT | Mod: CPTII,,, | Performed by: NURSE PRACTITIONER

## 2023-03-06 PROCEDURE — 99214 OFFICE O/P EST MOD 30 MIN: CPT | Mod: PBBFAC | Performed by: NURSE PRACTITIONER

## 2023-03-06 PROCEDURE — 1160F PR REVIEW ALL MEDS BY PRESCRIBER/CLIN PHARMACIST DOCUMENTED: ICD-10-PCS | Mod: CPTII,,, | Performed by: NURSE PRACTITIONER

## 2023-03-06 PROCEDURE — 3008F PR BODY MASS INDEX (BMI) DOCUMENTED: ICD-10-PCS | Mod: CPTII,,, | Performed by: NURSE PRACTITIONER

## 2023-03-06 NOTE — PROGRESS NOTES
Chief Complaint:   Chief Complaint   Patient presents with    Hematuria     Referral for microscopic hematuria. Patient denies any gross blood.       HPI:  Patient is a 63-year-old female referred to urology for microscopic hematuria.  Patient seen by PCP incidentally finding persistent microscopic hematuria fluctuating from a +3 to trace on UAs past 6 months.Today patient denies dysuria, urinary urgency, frequency, incontinence, retention, gross hematuria, nocturia. Patient denies family history of urologic pathology.  Patient has had a cysto 3 years ago due to [facility] with Dr. Goodman mild trabeculation noted.      Allergies:  Review of patient's allergies indicates:   Allergen Reactions    Hydrochlorothiazide      Other reaction(s): rash  Rash    Hydrobenzthiazide Rash    Sulfa (sulfonamide antibiotics) Rash    Sulfur Rash       Medications:  Current Outpatient Medications   Medication Sig Dispense Refill    blood pressure monitor (BLOOD PRESSURE KIT) Kit Use daily to assess blood pressure 1 each 0    levothyroxine (SYNTHROID) 75 MCG tablet Take 1 tablet (75 mcg total) by mouth before breakfast. 90 tablet 1    lisinopriL (PRINIVIL,ZESTRIL) 20 MG tablet Take 1 tablet (20 mg total) by mouth once daily. 90 tablet 1     No current facility-administered medications for this visit.       Review of Systems:  General: No fever, chills, fatigability, or weight loss.  Skin: No rashes, itching, or changes in color or texture of skin.  Chest: Denies CORLEY, cyanosis, wheezing, cough, and sputum production.  Abdomen: Appetite fine. No weight loss. Denies diarrhea, abdominal pain, hematemesis, or blood in stool.  Musculoskeletal: No joint stiffness or swelling. Denies back pain.  : As above.  All other review of systems negative.    PMH:  Past Medical History:   Diagnosis Date    Bradycardia     Chronic back pain     Essential (primary) hypertension     Hypothyroidism, unspecified     Menopause     Microscopic hematuria      Nontoxic single thyroid nodule     Sleep disorder not due to a substance or known physiological condition, unspecified        PSH:  Past Surgical History:   Procedure Laterality Date    NO PAST SURGERIES         FamHx:  Family History   Problem Relation Age of Onset    Diabetes type II Mother     Hypertension Mother     Hypertension Father        SocHx:  Social History     Socioeconomic History    Marital status:      Spouse name: Manas    Number of children: 2   Tobacco Use    Smoking status: Never    Smokeless tobacco: Never   Substance and Sexual Activity    Alcohol use: Never    Drug use: Never    Sexual activity: Yes     Partners: Male     Social Determinants of Health     Financial Resource Strain: Low Risk     Difficulty of Paying Living Expenses: Not hard at all   Food Insecurity: No Food Insecurity    Worried About Running Out of Food in the Last Year: Never true    Ran Out of Food in the Last Year: Never true   Transportation Needs: No Transportation Needs    Lack of Transportation (Medical): No    Lack of Transportation (Non-Medical): No   Physical Activity: Insufficiently Active    Days of Exercise per Week: 2 days    Minutes of Exercise per Session: 40 min   Stress: No Stress Concern Present    Feeling of Stress : Not at all   Social Connections: Socially Integrated    Frequency of Communication with Friends and Family: Twice a week    Frequency of Social Gatherings with Friends and Family: Once a week    Attends [employer] Services: More than 4 times per year    Active Member of Clubs or Organizations: Yes    Attends Club or Organization Meetings: More than 4 times per year    Marital Status:    Housing Stability: Low Risk     Unable to Pay for Housing in the Last Year: No    Number of Places Lived in the Last Year: 1    Unstable Housing in the Last Year: No       Physical Exam:  Vitals:    03/06/23 0735   BP: 120/66   Pulse: (!) 50   Temp: 97.7 °F (36.5 °C)     General: A&Ox3, no apparent  distress, no deformities  Neck: No masses, normal thyroid  Lungs: CTA rose mary, no use of accessory muscles  Heart: RRR, no arrhythmias  Abdomen: Soft, NT, ND, no masses, no hernias, no hepatosplenomegaly  Lymphatic: Neck and groin nodes negative  Skin: The skin is warm and dry. No jaundice.  Ext: No c/c/e.        Labs:  UAs to the past 6 months +1 to +3        Impression:  Microscopic hematuria      Plan: Instructed patient CT of the abdomen and pelvis with and without contrast, set up patient for next available cysto.  Instructed patient if develops any urologic symptoms notify clinic to be re-evaluated treated.

## 2023-03-06 NOTE — PROGRESS NOTES
Patient seen by SAMMI Martini NP will have CT and cysto. Written and verbal discharge instructions given.

## 2023-03-07 LAB
ESTROGEN SERPL-MCNC: NORMAL PG/ML
INSULIN SERPL-ACNC: NORMAL U[IU]/ML
LAB AP CLINICAL INFORMATION: NORMAL
LAB AP GROSS DESCRIPTION: NORMAL

## 2023-03-14 ENCOUNTER — HOSPITAL ENCOUNTER (OUTPATIENT)
Dept: RADIOLOGY | Facility: HOSPITAL | Age: 63
Discharge: HOME | End: 2023-03-14
Attending: NURSE PRACTITIONER
Payer: MEDICARE

## 2023-03-14 ENCOUNTER — HOSPITAL ENCOUNTER (EMERGENCY)
Facility: HOSPITAL | Age: 63
Discharge: HOME | End: 2023-03-14
Attending: EMERGENCY MEDICINE
Payer: MEDICARE

## 2023-03-14 VITALS
OXYGEN SATURATION: 99 % | SYSTOLIC BLOOD PRESSURE: 147 MMHG | DIASTOLIC BLOOD PRESSURE: 73 MMHG | HEART RATE: 58 BPM | TEMPERATURE: 98 F | HEIGHT: 68 IN | WEIGHT: 141.13 LBS | RESPIRATION RATE: 18 BRPM | BODY MASS INDEX: 21.39 KG/M2

## 2023-03-14 DIAGNOSIS — M79.606 LEG PAIN: ICD-10-CM

## 2023-03-14 DIAGNOSIS — Z12.31 ENCOUNTER FOR SCREENING MAMMOGRAM FOR MALIGNANT NEOPLASM OF BREAST: ICD-10-CM

## 2023-03-14 DIAGNOSIS — Z78.0 ASYMPTOMATIC POSTMENOPAUSAL STATE: ICD-10-CM

## 2023-03-14 DIAGNOSIS — R52 PAIN: ICD-10-CM

## 2023-03-14 LAB — D DIMER PPP IA.FEU-MCNC: 1.22 UG/ML FEU (ref 0–0.5)

## 2023-03-14 PROCEDURE — 96372 THER/PROPH/DIAG INJ SC/IM: CPT | Performed by: PHYSICIAN ASSISTANT

## 2023-03-14 PROCEDURE — 99285 EMERGENCY DEPT VISIT HI MDM: CPT | Mod: 25

## 2023-03-14 PROCEDURE — 77080 DXA BONE DENSITY AXIAL: CPT | Mod: TC

## 2023-03-14 PROCEDURE — 77067 SCR MAMMO BI INCL CAD: CPT | Mod: TC

## 2023-03-14 PROCEDURE — 63600175 PHARM REV CODE 636 W HCPCS: Performed by: PHYSICIAN ASSISTANT

## 2023-03-14 PROCEDURE — 85379 FIBRIN DEGRADATION QUANT: CPT | Performed by: PHYSICIAN ASSISTANT

## 2023-03-14 PROCEDURE — 77067 MAMMO DIGITAL SCREENING BILAT WITH TOMO: ICD-10-PCS | Mod: 26,,, | Performed by: RADIOLOGY

## 2023-03-14 PROCEDURE — 77063 MAMMO DIGITAL SCREENING BILAT WITH TOMO: ICD-10-PCS | Mod: 26,,, | Performed by: RADIOLOGY

## 2023-03-14 PROCEDURE — 77067 SCR MAMMO BI INCL CAD: CPT | Mod: 26,,, | Performed by: RADIOLOGY

## 2023-03-14 PROCEDURE — 77063 BREAST TOMOSYNTHESIS BI: CPT | Mod: 26,,, | Performed by: RADIOLOGY

## 2023-03-14 RX ORDER — KETOROLAC TROMETHAMINE 30 MG/ML
15 INJECTION, SOLUTION INTRAMUSCULAR; INTRAVENOUS
Status: COMPLETED | OUTPATIENT
Start: 2023-03-14 | End: 2023-03-14

## 2023-03-14 RX ORDER — MELOXICAM 7.5 MG/1
7.5 TABLET ORAL DAILY
Qty: 14 TABLET | Refills: 0 | Status: SHIPPED | OUTPATIENT
Start: 2023-03-14 | End: 2023-03-28

## 2023-03-14 RX ADMIN — KETOROLAC TROMETHAMINE 15 MG: 30 INJECTION, SOLUTION INTRAMUSCULAR at 09:03

## 2023-03-14 NOTE — ED PROVIDER NOTES
Encounter Date: 3/14/2023       History     Chief Complaint   Patient presents with    Knee Pain     PT W CO RT KNEE AND LOWER LEG PAIN FOR MONTHS.  DENIES INJURY.  NO SWELLING NOTED.      Patient reports to the R with complaints of right knee and leg pain for the past x2 months; pt denies known injury    The history is provided by the patient.   Knee Pain  This is a recurrent problem. The current episode started more than 1 week ago. The problem occurs constantly. The problem has not changed since onset.Pertinent negatives include no chest pain, no abdominal pain, no headaches and no shortness of breath. The symptoms are aggravated by standing and walking. The symptoms are relieved by rest. She has tried rest for the symptoms. The treatment provided mild relief.   Review of patient's allergies indicates:   Allergen Reactions    Hydrochlorothiazide      Other reaction(s): rash  Rash    Hydrobenzthiazide Rash    Sulfa (sulfonamide antibiotics) Rash    Sulfur Rash     Past Medical History:   Diagnosis Date    Bradycardia     Chronic back pain     Essential (primary) hypertension     Hypothyroidism, unspecified     Menopause     Microscopic hematuria     Nontoxic single thyroid nodule     Sleep disorder not due to a substance or known physiological condition, unspecified      Past Surgical History:   Procedure Laterality Date    NO PAST SURGERIES       Family History   Problem Relation Age of Onset    Diabetes type II Mother     Hypertension Mother     Hypertension Father      Social History     Tobacco Use    Smoking status: Never    Smokeless tobacco: Never   Substance Use Topics    Alcohol use: Never    Drug use: Never     Review of Systems   Constitutional:  Negative for fever.   HENT:  Negative for sore throat.    Eyes: Negative.    Respiratory:  Negative for shortness of breath.    Cardiovascular:  Negative for chest pain.   Gastrointestinal:  Negative for abdominal pain and nausea.   Genitourinary:  Negative for  dysuria.   Musculoskeletal:  Negative for back pain.   Skin:  Negative for rash.   Neurological:  Negative for weakness and headaches.   Hematological:  Does not bruise/bleed easily.   Psychiatric/Behavioral: Negative.       Physical Exam     Initial Vitals [03/14/23 0831]   BP Pulse Resp Temp SpO2   (!) 147/73 (!) 58 18 97.9 °F (36.6 °C) 99 %      MAP       --         Physical Exam    Vitals reviewed.  Constitutional: She appears well-developed and well-nourished.   HENT:   Head: Normocephalic and atraumatic.   Eyes: Conjunctivae and EOM are normal. Pupils are equal, round, and reactive to light.   Neck: Neck supple.   Normal range of motion.  Cardiovascular:  Normal rate, regular rhythm and normal heart sounds.           Pulmonary/Chest: Breath sounds normal. No respiratory distress. She has no wheezes. She exhibits no tenderness.   Abdominal: Abdomen is soft. Bowel sounds are normal. There is no abdominal tenderness.   Musculoskeletal:      Cervical back: Normal range of motion and neck supple.      Right knee: No swelling. Decreased range of motion. Tenderness present. Normal pulse.      Left knee: Normal. Normal pulse.        Legs:      Neurological: She is alert and oriented to person, place, and time. She displays normal reflexes. No cranial nerve deficit or sensory deficit.   Skin: Skin is warm and dry.   Psychiatric: She has a normal mood and affect. Her behavior is normal. Judgment and thought content normal.       ED Course   Procedures  Labs Reviewed   D DIMER, QUANTITATIVE - Abnormal; Notable for the following components:       Result Value    D-Dimer 1.22 (*)     All other components within normal limits   EXTRA TUBES    Narrative:     The following orders were created for panel order EXTRA TUBES.  Procedure                               Abnormality         Status                     ---------                               -----------         ------                     Light Green Top Hold[[id number]]                              In process                 Lavender Top Hold[[id number]]                                In process                   Please view results for these tests on the individual orders.   LIGHT GREEN TOP HOLD   LAVENDER TOP HOLD          Imaging Results              X-Ray Knee 3 View Right (Final result)  Result time 03/14/23 10:56:59      Final result by Shola Waldron MD (03/14/23 10:56:59)                   Impression:      Minimal degenerative changes.      Electronically signed by: Shola Waldron  Date:    03/14/2023  Time:    10:56               Narrative:    EXAMINATION:  XR KNEE 3 VIEW RIGHT    CLINICAL HISTORY:  Pain, unspecified    COMPARISON:  None.    FINDINGS:  No acute displaced fractures or dislocations.    There is minimal narrowing of the medial compartment of the knee joint articular spaces are otherwise preserved with smooth articular surfaces    No blastic or lytic lesions.    Soft tissues within normal limits.                                       Medications   ketorolac injection 15 mg (15 mg Intramuscular Given 3/14/23 0935)                              Clinical Impression:   Final diagnoses:  [R52] Pain  [M79.606] Leg pain        ED Disposition Condition    Discharge Stable          ED Prescriptions       Medication Sig Dispense Start Date End Date Auth. Provider    meloxicam (MOBIC) 7.5 MG tablet Take 1 tablet (7.5 mg total) by mouth once daily. for 14 days 14 tablet 3/14/2023 3/28/2023 BALDEMAR Chakraborty          Follow-up Information       Follow up With Specialties Details Why Contact Info    discharge followup    If your symptoms become WORSE or you DO NOT IMPROVE and you are unable to reach your health care provider, you should RETURN to the emergency department    discharge info    Discussed all pertinent ED information, results, diagnosis and treatment plan; All questions and concerns were addressed at this time. Patient voices understanding of information  and instructions. Patient is comfortable with plan and discharge             BALDEMAR Chakraborty  03/14/23 2475

## 2023-03-17 ENCOUNTER — TELEPHONE (OUTPATIENT)
Dept: ADMINISTRATIVE | Facility: HOSPITAL | Age: 63
End: 2023-03-17
Payer: MEDICARE

## 2023-03-17 NOTE — TELEPHONE ENCOUNTER
Please inform:    1) Mammography Normal      2) Bone density: Osteopenia, borderline  1. Ensure adequate intake of Vitamin D and Calcium  Vitamin D supplementation of 800-2,000 IU daily  Calcium supplementation of 1,200 to 1,500 mg daily  2. Weight-bearing exercise (i.e., brisk walking, light jogging, cycling)  3. Tobacco cessation  4. Limit alcohol intake to moderate use (2 or less drinks/day)  5. Limit PPI ((such as Nexium, Prilosec, omeprazole, pantoprazole) Use

## 2023-03-21 ENCOUNTER — HOSPITAL ENCOUNTER (OUTPATIENT)
Dept: RADIOLOGY | Facility: HOSPITAL | Age: 63
Discharge: HOME | End: 2023-03-21
Attending: NURSE PRACTITIONER
Payer: MEDICARE

## 2023-03-21 DIAGNOSIS — R31.29 MICROSCOPIC HEMATURIA: ICD-10-CM

## 2023-03-21 LAB
CREAT SERPL-MCNC: 0.78 MG/DL (ref 0.55–1.02)
GFR SERPLBLD CREATININE-BSD FMLA CKD-EPI: >60 MLS/MIN/1.73/M2

## 2023-03-21 PROCEDURE — 74178 CT ABD&PLV WO CNTR FLWD CNTR: CPT | Mod: TC

## 2023-03-21 PROCEDURE — 82565 ASSAY OF CREATININE: CPT | Performed by: NURSE PRACTITIONER

## 2023-03-21 PROCEDURE — 25500020 PHARM REV CODE 255: Performed by: NURSE PRACTITIONER

## 2023-03-21 RX ADMIN — IOHEXOL 100 ML: 350 INJECTION, SOLUTION INTRAVENOUS at 08:03

## 2023-03-21 NOTE — TELEPHONE ENCOUNTER
Patient informed mammogram normal and borderline osteopenia. Patient informed to take Calcium and Vitamin D supplements. Patient voiced understanding.

## 2023-04-04 ENCOUNTER — HOSPITAL ENCOUNTER (EMERGENCY)
Facility: HOSPITAL | Age: 63
Discharge: HOME | End: 2023-04-04
Attending: EMERGENCY MEDICINE
Payer: MEDICARE

## 2023-04-04 VITALS
TEMPERATURE: 99 F | WEIGHT: 141.13 LBS | BODY MASS INDEX: 21.39 KG/M2 | DIASTOLIC BLOOD PRESSURE: 67 MMHG | RESPIRATION RATE: 16 BRPM | OXYGEN SATURATION: 97 % | HEART RATE: 69 BPM | SYSTOLIC BLOOD PRESSURE: 109 MMHG | HEIGHT: 68 IN

## 2023-04-04 DIAGNOSIS — U07.1 COVID-19 VIRUS DETECTED: ICD-10-CM

## 2023-04-04 DIAGNOSIS — U07.1 COVID-19: Primary | ICD-10-CM

## 2023-04-04 LAB
FLUAV AG UPPER RESP QL IA.RAPID: NOT DETECTED
FLUBV AG UPPER RESP QL IA.RAPID: NOT DETECTED
SARS-COV-2 RNA RESP QL NAA+PROBE: DETECTED

## 2023-04-04 PROCEDURE — 0240U COVID/FLU A&B PCR: CPT | Performed by: PHYSICIAN ASSISTANT

## 2023-04-04 PROCEDURE — 99284 EMERGENCY DEPT VISIT MOD MDM: CPT

## 2023-04-04 RX ORDER — FLUTICASONE PROPIONATE 50 MCG
1 SPRAY, SUSPENSION (ML) NASAL 2 TIMES DAILY PRN
Qty: 15 G | Refills: 0 | Status: ON HOLD | OUTPATIENT
Start: 2023-04-04 | End: 2024-02-22 | Stop reason: HOSPADM

## 2023-04-04 RX ORDER — IBUPROFEN 800 MG/1
800 TABLET ORAL EVERY 6 HOURS PRN
Qty: 20 TABLET | Refills: 0 | Status: SHIPPED | OUTPATIENT
Start: 2023-04-04 | End: 2023-10-04 | Stop reason: HOSPADM

## 2023-04-04 RX ORDER — PROMETHAZINE HYDROCHLORIDE AND DEXTROMETHORPHAN HYDROBROMIDE 6.25; 15 MG/5ML; MG/5ML
5 SYRUP ORAL EVERY 6 HOURS PRN
Qty: 100 ML | Refills: 0 | Status: SHIPPED | OUTPATIENT
Start: 2023-04-04 | End: 2023-04-09

## 2023-04-04 NOTE — DISCHARGE INSTRUCTIONS
Report to Emergency Department if symptoms return or worsen; [facility] Within 1 to 2 days, It is important that you follow up with your primary care provider or specialist if indicated for further evaluation, workup, and treatment as necessary. The exam and treatment you received in Emergency Department was for an urgent problem and NOT INTENDED AS COMPLETE CARE. It is important that you FOLLOW UP with a doctor for ongoing care. If your symptoms become WORSE or you DO NOT IMPROVE and you are unable to reach your health care provider, you should RETURN to the Emergency Department. The Emergency Department provider has provided a PRELIMINARY INTERPRETATION of all your studies. A final interpretation may be done after you are discharged. If a change in your diagnosis or treatment is needed WE WILL CONTACT YOU. It is critical that we have a CURRENT PHONE NUMBER FOR YOU.

## 2023-04-04 NOTE — ED PROVIDER NOTES
Encounter Date: 4/4/2023       History     Chief Complaint   Patient presents with    Cough     Co cough, body aches, ha and subjective fever x 3 days.      62 yo F w/ PMHx significant for HTN presents to ED c/o 3 day hx of congestion, rhinorrhea, sneezing, cough, body aches, HA, fatigue & subjective fever. Patient reports her  is sick w/ similar symptoms, but neither of them have been tested for anything yet. Reports taking OTC meds w/o significant relief of symptoms. Denies dizziness, vision changes, neck stiffness, confusion/AMS, speech difficulty, focal weakness, numbness, paralysis, ataxia, abnormal balance, hemoptysis, wheezing, CP, SOB, palpitations, diaphoresis, syncope, orthopnea, edema, N/V. VSS on arrival, patient in NAD.    Review of patient's allergies indicates:   Allergen Reactions    Hydrochlorothiazide      Other reaction(s): rash  Rash    Hydrobenzthiazide Rash    Sulfa (sulfonamide antibiotics) Rash    Sulfur Rash     Past Medical History:   Diagnosis Date    Bradycardia     Chronic back pain     Essential (primary) hypertension     Hypothyroidism, unspecified     Menopause     Microscopic hematuria     Nontoxic single thyroid nodule     Sleep disorder not due to a substance or known physiological condition, unspecified      Past Surgical History:   Procedure Laterality Date    NO PAST SURGERIES       Family History   Problem Relation Age of Onset    Diabetes type II Mother     Hypertension Mother     Hypertension Father      Social History     Tobacco Use    Smoking status: Never    Smokeless tobacco: Never   Substance Use Topics    Alcohol use: Never    Drug use: Never     Review of Systems   All other systems reviewed and are negative.    Physical Exam     Initial Vitals [04/04/23 0729]   BP Pulse Resp Temp SpO2   109/67 69 16 99.3 °F (37.4 °C) 97 %      MAP       --         Physical Exam    Nursing note and vitals reviewed.  Constitutional: She appears well-developed and well-nourished.  She is not diaphoretic. No distress.   HENT:   Head: Normocephalic and atraumatic.   Nose: Mucosal edema present. No rhinorrhea. Right sinus exhibits no maxillary sinus tenderness and no frontal sinus tenderness. Left sinus exhibits no maxillary sinus tenderness and no frontal sinus tenderness.   Mouth/Throat: Uvula is midline, oropharynx is clear and moist and mucous membranes are normal. No trismus in the jaw. No uvula swelling. No oropharyngeal exudate, posterior oropharyngeal edema, posterior oropharyngeal erythema or tonsillar abscesses.   Eyes: Conjunctivae and EOM are normal. Pupils are equal, round, and reactive to light. No scleral icterus.   Neck: Neck supple. No JVD present.   Normal range of motion.   Full passive range of motion without pain.     Cardiovascular:  Normal rate, regular rhythm, normal heart sounds and intact distal pulses.     Exam reveals no gallop and no friction rub.       No murmur heard.  Pulmonary/Chest: Breath sounds normal. No respiratory distress. She has no wheezes. She has no rhonchi. She has no rales.   Abdominal: Abdomen is soft. Bowel sounds are normal. She exhibits no distension. There is no abdominal tenderness. There is no rebound and no guarding.   Musculoskeletal:         General: No tenderness or edema. Normal range of motion.      Cervical back: Full passive range of motion without pain, normal range of motion and neck supple. No rigidity.     Lymphadenopathy:     She has no cervical adenopathy.   Neurological: She is alert and oriented to person, place, and time. She has normal strength. No cranial nerve deficit or sensory deficit. GCS score is 15. GCS eye subscore is 4. GCS verbal subscore is 5. GCS motor subscore is 6.   Skin: Skin is warm and dry. Capillary refill takes less than 2 seconds. No rash noted. No erythema. No pallor.   Psychiatric: She has a normal mood and affect. Thought content normal.       ED Course   Procedures  Labs Reviewed   COVID/FLU A&B PCR -  Abnormal; Notable for the following components:       Result Value    SARS-CoV-2 PCR Detected (*)     All other components within normal limits    Narrative:     The Xpert Xpress SARS-CoV-2/FLU/RSV plus is a rapid, multiplexed real-time PCR test intended for the simultaneous qualitative detection and differentiation of SARS-CoV-2, Influenza A, Influenza B, and respiratory syncytial virus (RSV) viral RNA in either nasopharyngeal swab or nasal swab specimens.                Imaging Results    None          Medications - No data to display  Medical Decision Making:   Clinical Tests:   Lab Tests: Ordered and Reviewed  Patient is COVID positive. No signs of respiratory or other distress while in ED. Will discharge w/ meds for symptom relief. Instructed to follow-up w/ PCP. ED precautions given for new or worsening symptoms.                        Clinical Impression:   Final diagnoses:  [U07.1] COVID-19 (Primary)        ED Disposition Condition    Discharge Good          ED Prescriptions       Medication Sig Dispense Start Date End Date Auth. Provider    ibuprofen (ADVIL,MOTRIN) 800 MG tablet Take 1 tablet (800 mg total) by mouth every 6 (six) hours as needed for Pain. 20 tablet 4/4/2023 -- BALDEMAR Rincon    promethazine-dextromethorphan (PROMETHAZINE-DM) 6.25-15 mg/5 mL Syrp Take 5 mLs by mouth every 6 (six) hours as needed (cough). 100 mL 4/4/2023 4/9/2023 BALDEMAR Rincon    fluticasone propionate (FLONASE) 50 mcg/actuation nasal spray 1 spray (50 mcg total) by Each Nostril route 2 (two) times daily as needed for Rhinitis. 15 g 4/4/2023 -- BALDEMAR Rincon          Follow-up Information       Follow up With Specialties Details Why Contact Info    [facility] Schedule an appointment as soon as possible for a visit   [facility] [facility] [address] [phone]      [facility] Emergency Medicine  As needed, If symptoms worsen [facility] [facility]  [place] [address]  [phone]             BALDEMAR Rincon  04/04/23 0847

## 2023-05-15 PROBLEM — Z00.00 WELLNESS EXAMINATION: Status: RESOLVED | Noted: 2022-08-16 | Resolved: 2023-05-15

## 2023-06-07 ENCOUNTER — PROCEDURE VISIT (OUTPATIENT)
Dept: UROLOGY | Facility: CLINIC | Age: 63
End: 2023-06-07
Payer: MEDICARE

## 2023-06-07 VITALS
HEIGHT: 68 IN | HEART RATE: 70 BPM | OXYGEN SATURATION: 98 % | RESPIRATION RATE: 18 BRPM | DIASTOLIC BLOOD PRESSURE: 75 MMHG | SYSTOLIC BLOOD PRESSURE: 133 MMHG | BODY MASS INDEX: 22.4 KG/M2 | WEIGHT: 147.81 LBS

## 2023-06-07 DIAGNOSIS — R31.29 MICROSCOPIC HEMATURIA: Primary | ICD-10-CM

## 2023-06-07 LAB
BILIRUB SERPL-MCNC: NORMAL MG/DL
BLOOD URINE, POC: NORMAL
COLOR, POC UA: YELLOW
GLUCOSE UR QL STRIP: NORMAL
KETONES UR QL STRIP: NORMAL
LEUKOCYTE ESTERASE URINE, POC: NORMAL
NITRITE, POC UA: NORMAL
PH, POC UA: 5.5
PROTEIN, POC: NORMAL
SPECIFIC GRAVITY, POC UA: >=1.03
UROBILINOGEN, POC UA: 0.2

## 2023-06-07 PROCEDURE — 52000 CYSTOURETHROSCOPY: CPT | Mod: PBBFAC | Performed by: UROLOGY

## 2023-06-07 PROCEDURE — 52000 PR CYSTOURETHROSCOPY: ICD-10-PCS | Mod: S$PBB,,, | Performed by: UROLOGY

## 2023-06-07 PROCEDURE — 81001 URINALYSIS AUTO W/SCOPE: CPT | Mod: PBBFAC | Performed by: UROLOGY

## 2023-06-07 PROCEDURE — 52000 CYSTOURETHROSCOPY: CPT | Mod: S$PBB,,, | Performed by: UROLOGY

## 2023-06-07 RX ORDER — LIDOCAINE HYDROCHLORIDE 20 MG/ML
JELLY TOPICAL
Status: COMPLETED | OUTPATIENT
Start: 2023-06-07 | End: 2023-06-07

## 2023-06-07 RX ORDER — CIPROFLOXACIN 500 MG/1
500 TABLET ORAL
Status: COMPLETED | OUTPATIENT
Start: 2023-06-07 | End: 2023-06-07

## 2023-06-07 RX ADMIN — CIPROFLOXACIN 500 MG: 500 TABLET ORAL at 08:06

## 2023-06-07 RX ADMIN — LIDOCAINE HYDROCHLORIDE: 20 JELLY TOPICAL at 08:06

## 2023-06-07 NOTE — PROCEDURES
CC:  Cystoscopy    HPI:  Julianne Parker is a 63 y.o. female here for cystoscopy for hematuria.  She has a history of microscopic hematuria and had a negative workup approximally three years ago.  This has persisted.  She denies any urinary complaints.  She has no tobacco use history.  She denies gross hematuria.    Urinalysis:  Results for orders placed or performed in visit on 06/07/23   POCT URINE DIPSTICK WITH MICROSCOPE, AUTOMATED   Result Value Ref Range    Color, UA Yellow     Spec Grav UA >=1.030     pH, UA 5.5     WBC, UA neg     Nitrite, UA neg     Protein, POC neg     Glucose, UA neg     Ketones, UA neg     Urobilinogen, UA 0.2     Bilirubin, POC neg     Blood, UA small         Microscopic              3-5 RBC per HPF    Imaging:  CT - 21 March 2023: Negative     ROS:  All systems reviewed and are negative except as documented in HPI and/or Assessment and Plan.     Patient Active Problem List:     Patient Active Problem List   Diagnosis    Hypertension    Microscopic hematuria    Hypothyroidism    Thyroid nodule    Other neutropenia    Screening for colon cancer    Vitamin D deficiency    High cholesterol    Bradycardia    Headache    Lipoma of torso        Past Medical History:  Past Medical History:   Diagnosis Date    Bradycardia     Chronic back pain     Essential (primary) hypertension     Hypothyroidism, unspecified     Menopause     Microscopic hematuria     Nontoxic single thyroid nodule     Sleep disorder not due to a substance or known physiological condition, unspecified         Past Surgical History:  Past Surgical History:   Procedure Laterality Date    NO PAST SURGERIES          Family History:  Family History   Problem Relation Age of Onset    Diabetes type II Mother     Hypertension Mother     Hypertension Father         Social History:  Social History     Socioeconomic History    Marital status:      Spouse name: Manas    Number of children: 2   Tobacco Use    Smoking status:  Never    Smokeless tobacco: Never   Substance and Sexual Activity    Alcohol use: Never    Drug use: Never    Sexual activity: Yes     Partners: Male     Social Determinants of Health     Financial Resource Strain: Low Risk     Difficulty of Paying Living Expenses: Not hard at all   Food Insecurity: No Food Insecurity    Worried About Running Out of Food in the Last Year: Never true    Ran Out of Food in the Last Year: Never true   Transportation Needs: No Transportation Needs    Lack of Transportation (Medical): No    Lack of Transportation (Non-Medical): No   Physical Activity: Insufficiently Active    Days of Exercise per Week: 2 days    Minutes of Exercise per Session: 40 min   Stress: No Stress Concern Present    Feeling of Stress : Not at all   Social Connections: Socially Integrated    Frequency of Communication with Friends and Family: Twice a week    Frequency of Social Gatherings with Friends and Family: Once a week    Attends [employer] Services: More than 4 times per year    Active Member of Clubs or Organizations: Yes    Attends Club or Organization Meetings: More than 4 times per year    Marital Status:    Housing Stability: Low Risk     Unable to Pay for Housing in the Last Year: No    Number of Places Lived in the Last Year: 1    Unstable Housing in the Last Year: No        Allergies:  Review of patient's allergies indicates:   Allergen Reactions    Hydrochlorothiazide      Other reaction(s): rash  Rash    Hydrocortisone Other (See Comments)    Sulfamethoxazole-trimethoprim Other (See Comments)    Hydrobenzthiazide Rash    Sulfa (sulfonamide antibiotics) Rash    Sulfur Rash        Objective:  Vitals:    06/07/23 0829   BP: 133/75   Pulse: 70   Resp: 18     General:  Well developed, well nourished adult female in no acute distress  Abdomen: Soft, nontender, no masses  Extremities:  No clubbing, cyanosis, or edema  Neurologic:  Grossly intact  Musculoskeletal:  Normal tone  :  External  genitalia is normal without lesions.  Vagina is normal.      Cystoscopy:        - Urethral meatus:  No strictures        - Urethra:  Normal without strictures or lesions        - Bladder neck:  Normal        - Bladder:  No mucosal abnormalities        - Ureteral orifices:  On the trigone with clear efflux bilaterally    The patient tolerated the procedure well without complications.  She was given Cipro 500mg, one tablet in the clinic.   The urethra was anesthetized with 2% Lidocaine Jelly, Urojet.      Assessment:  1. Microscopic hematuria  - POCT URINE DIPSTICK WITH MICROSCOPE, AUTOMATED     Plan:  Return in six months for follow-up urinalysis.    Follow-up:    Six months.

## 2023-06-07 NOTE — PROGRESS NOTES
Pt seen by Dr. Mckeon. Cysto performed in clinic. Consents signed and obtained by staff. Pt received medication per procedure protocol. Ciprofloxacin HCl tablet 500 mg & LIDOcaine HCl 2% urojet administered and tolerated well. RTC  6 months. Pt education given both written and verbal.

## 2023-08-09 ENCOUNTER — PATIENT OUTREACH (OUTPATIENT)
Dept: ADMINISTRATIVE | Facility: HOSPITAL | Age: 63
End: 2023-08-09
Payer: MEDICARE

## 2023-08-09 NOTE — PROGRESS NOTES
The following record(s)  below were uploaded for Health Maintenance .             PAP SMEAR              2021  HPV SCREENING     2021

## 2023-08-22 DIAGNOSIS — E03.9 HYPOTHYROIDISM, UNSPECIFIED TYPE: ICD-10-CM

## 2023-08-23 DIAGNOSIS — I10 HYPERTENSION, UNSPECIFIED TYPE: ICD-10-CM

## 2023-08-23 RX ORDER — LEVOTHYROXINE SODIUM 75 UG/1
75 TABLET ORAL
Qty: 90 TABLET | Refills: 0 | Status: SHIPPED | OUTPATIENT
Start: 2023-08-23 | End: 2023-08-30 | Stop reason: SDUPTHER

## 2023-08-23 RX ORDER — LISINOPRIL 20 MG/1
20 TABLET ORAL DAILY
Qty: 30 TABLET | Refills: 0 | Status: SHIPPED | OUTPATIENT
Start: 2023-08-23 | End: 2023-08-30 | Stop reason: SDUPTHER

## 2023-08-23 NOTE — TELEPHONE ENCOUNTER
----- Message from Mikayla Yancey sent at 8/22/2023  9:27 AM CDT -----  Regarding: medicine refills    Preferred Pharmacy: [facility]    Last Visit:02/2023  Next Visit: 08/30/23         1. Name of Medication: Lisinopril 20mg           2. Name of Medication: Levothyroxine 75mcg    Thank you,  Mikayla

## 2023-08-30 ENCOUNTER — OFFICE VISIT (OUTPATIENT)
Dept: INTERNAL MEDICINE | Facility: CLINIC | Age: 63
End: 2023-08-30
Payer: MEDICARE

## 2023-08-30 VITALS
DIASTOLIC BLOOD PRESSURE: 60 MMHG | WEIGHT: 149.38 LBS | HEART RATE: 56 BPM | RESPIRATION RATE: 20 BRPM | SYSTOLIC BLOOD PRESSURE: 116 MMHG | BODY MASS INDEX: 22.64 KG/M2 | HEIGHT: 68 IN | TEMPERATURE: 98 F

## 2023-08-30 DIAGNOSIS — E78.00 HIGH CHOLESTEROL: ICD-10-CM

## 2023-08-30 DIAGNOSIS — Z12.11 SCREENING FOR COLON CANCER: Primary | ICD-10-CM

## 2023-08-30 DIAGNOSIS — R31.29 MICROSCOPIC HEMATURIA: ICD-10-CM

## 2023-08-30 DIAGNOSIS — R00.1 BRADYCARDIA: ICD-10-CM

## 2023-08-30 DIAGNOSIS — D70.8 OTHER NEUTROPENIA: ICD-10-CM

## 2023-08-30 DIAGNOSIS — E03.9 HYPOTHYROIDISM, UNSPECIFIED TYPE: ICD-10-CM

## 2023-08-30 DIAGNOSIS — I10 HYPERTENSION, UNSPECIFIED TYPE: ICD-10-CM

## 2023-08-30 DIAGNOSIS — Z00.00 WELLNESS EXAMINATION: ICD-10-CM

## 2023-08-30 DIAGNOSIS — Z13.1 SCREENING FOR DIABETES MELLITUS: ICD-10-CM

## 2023-08-30 PROBLEM — I87.2 CHRONIC VENOUS INSUFFICIENCY: Status: ACTIVE | Noted: 2023-08-30

## 2023-08-30 PROCEDURE — 99214 PR OFFICE/OUTPT VISIT, EST, LEVL IV, 30-39 MIN: ICD-10-PCS | Mod: S$PBB,,, | Performed by: NURSE PRACTITIONER

## 2023-08-30 PROCEDURE — 3288F FALL RISK ASSESSMENT DOCD: CPT | Mod: CPTII,,, | Performed by: NURSE PRACTITIONER

## 2023-08-30 PROCEDURE — 1160F RVW MEDS BY RX/DR IN RCRD: CPT | Mod: CPTII,,, | Performed by: NURSE PRACTITIONER

## 2023-08-30 PROCEDURE — 1159F MED LIST DOCD IN RCRD: CPT | Mod: CPTII,,, | Performed by: NURSE PRACTITIONER

## 2023-08-30 PROCEDURE — 1101F PT FALLS ASSESS-DOCD LE1/YR: CPT | Mod: CPTII,,, | Performed by: NURSE PRACTITIONER

## 2023-08-30 PROCEDURE — 3008F PR BODY MASS INDEX (BMI) DOCUMENTED: ICD-10-PCS | Mod: CPTII,,, | Performed by: NURSE PRACTITIONER

## 2023-08-30 PROCEDURE — 3078F PR MOST RECENT DIASTOLIC BLOOD PRESSURE < 80 MM HG: ICD-10-PCS | Mod: CPTII,,, | Performed by: NURSE PRACTITIONER

## 2023-08-30 PROCEDURE — 4010F PR ACE/ARB THEARPY RXD/TAKEN: ICD-10-PCS | Mod: CPTII,,, | Performed by: NURSE PRACTITIONER

## 2023-08-30 PROCEDURE — 3008F BODY MASS INDEX DOCD: CPT | Mod: CPTII,,, | Performed by: NURSE PRACTITIONER

## 2023-08-30 PROCEDURE — 1160F PR REVIEW ALL MEDS BY PRESCRIBER/CLIN PHARMACIST DOCUMENTED: ICD-10-PCS | Mod: CPTII,,, | Performed by: NURSE PRACTITIONER

## 2023-08-30 PROCEDURE — 1101F PR PT FALLS ASSESS DOC 0-1 FALLS W/OUT INJ PAST YR: ICD-10-PCS | Mod: CPTII,,, | Performed by: NURSE PRACTITIONER

## 2023-08-30 PROCEDURE — 3074F PR MOST RECENT SYSTOLIC BLOOD PRESSURE < 130 MM HG: ICD-10-PCS | Mod: CPTII,,, | Performed by: NURSE PRACTITIONER

## 2023-08-30 PROCEDURE — 4010F ACE/ARB THERAPY RXD/TAKEN: CPT | Mod: CPTII,,, | Performed by: NURSE PRACTITIONER

## 2023-08-30 PROCEDURE — 3078F DIAST BP <80 MM HG: CPT | Mod: CPTII,,, | Performed by: NURSE PRACTITIONER

## 2023-08-30 PROCEDURE — 3288F PR FALLS RISK ASSESSMENT DOCUMENTED: ICD-10-PCS | Mod: CPTII,,, | Performed by: NURSE PRACTITIONER

## 2023-08-30 PROCEDURE — 99214 OFFICE O/P EST MOD 30 MIN: CPT | Mod: S$PBB,,, | Performed by: NURSE PRACTITIONER

## 2023-08-30 PROCEDURE — 1159F PR MEDICATION LIST DOCUMENTED IN MEDICAL RECORD: ICD-10-PCS | Mod: CPTII,,, | Performed by: NURSE PRACTITIONER

## 2023-08-30 PROCEDURE — 3074F SYST BP LT 130 MM HG: CPT | Mod: CPTII,,, | Performed by: NURSE PRACTITIONER

## 2023-08-30 PROCEDURE — 99214 OFFICE O/P EST MOD 30 MIN: CPT | Mod: PBBFAC | Performed by: NURSE PRACTITIONER

## 2023-08-30 RX ORDER — LEVOTHYROXINE SODIUM 75 UG/1
75 TABLET ORAL
Qty: 90 TABLET | Refills: 1 | Status: SHIPPED | OUTPATIENT
Start: 2023-08-30 | End: 2024-03-18 | Stop reason: SDUPTHER

## 2023-08-30 RX ORDER — LISINOPRIL 20 MG/1
20 TABLET ORAL DAILY
Qty: 90 TABLET | Refills: 1 | Status: SHIPPED | OUTPATIENT
Start: 2023-08-30 | End: 2023-09-22 | Stop reason: SINTOL

## 2023-08-30 NOTE — PROGRESS NOTES
BERTHA Quispe   [facility]  [address] [facility] [id number]      PATIENT NAME: Julianne Parker  : 1960  DATE: 23  MRN: [id number]        Reason for Visit / Chief Complaint: Follow-up (Lab review)       History of Present Illness / Problem Focused Workflow     Julianne Parker presents to the clinic with Follow-up (Lab review)     Initial Visit 18: 58 y.o. AAF presenting to the clinic to re-establish primary care. Previous PCP SOLIS Baez NP. Last OV 2018. PMHx significant for HTN, Hypothyroidism, and Sinus Bradycardia. Bp at goal today. Currently taking Amlodipine 10 mg po daily and Lisinopril 5 mg po daily. TSH 2.700 (2018). Currently taking Levothyroxine 25 mcg po daily. Denies s/s of hypo/hyperthyroidism. Seen by Cardio 2018. Work-up mostly negative for bradycardia. Pt remains asymptomatic. She is to f/u with Cardio PRN. Following Urology for microscopic hematuria. Plan is for cystoscopy in the future. CT Abd/Pelvis was unremarkable. Pt presented to ED 2018 w/ c/o right breast lump. Diagnostic MMG completed. Recommendations were to f/u with right breast US. US needed. She was a NS for US in September. She is requesting appt be rescheduled. Pt does states that she can no longer feel the lump. Denies fever, chills, night sweats, CP, SOB, Abd pain, or any other concerns today.     4/3/19: 58 y.o. AAF with PMHx significant for HTN, Hypothyroidism, and Sinus Bradycardia, presenting for f/u. Bp at goal today. Currently taking Amlodipine 10 mg po daily and Lisinopril 5 mg po daily. TSH 1.570 (WNL). Currently taking Levothyroxine 25 mcg po daily. Denies s/s of hypo/hyperthyroidism. Seen by Cardio 19. HR stable. Remains asymptomatic. F/u with Cardio PRN. Pt was contacted by GI lab to scheduled cysto in 2018 but she never followed-up. Number to lab provided. Pt states that she will call today to try and  "schedule procedure. Reports that she had a Sleep Study conducted in 2015. Reviewed results with pt which revealed primary snoring. She did not meet criteria for ILENE. Pt replied "I'm good," and expressed that she's not interested in doing any repeat studies at this time as her symptoms have not changed/worsened. MMG scheduled Tuesday, April 23rd, 2019. Reports that she will return FIT as soon as she can. Denies fever, chills, HA, CP, SOB, abd pain, peripheral swelling, B/B dysfunction, or any other concerns today.     (10/3/19): Pt presenting for 6-mth f/u, Hypothyroidism, and Sinus Bradycardia. Bp at goal today. Currently taking Amlodipine 10 mg po daily and Lisinopril 5 mg po daily. TSH 1.570 (WNL) 2/26/19. Currently taking Levothyroxine 25 mcg po daily. Denies s/s of hypo/hyperthyroidism. New TSH level pending. Pt did not complete labs. She is not fasting so she plans to come to lab on Monday. She is amenable to returning FIT (has refused in the past). Doesn't appear that she's called to schedule cysto for hx of MSH. She was a NS for procedure 7/2019. Will repeat urine studies. She is following Dr. Deleon for hx of chronic back pain. States that she needs an MRI but insurance won't approve it. Wondering if MRI can be ordered here. Informed pt that specific details must be completed/documented before insurance would approve MRI. Will need to request medical records. Denies fever, chills, HA, weakness, dizziness, CP, SOB, abd pain, peripheral swelling, B/B dysfunction, or any other concerns today.      Mammo 4/2019 benign     (6/12/2020): 60 y.o. AAF with a PmHx of HTN, Hypothyroidism, MSH (following Urology), back pain, and Sinus Bradycardia, presenting for routine f/u. Bp at goal today. Currently taking Amlodipine 10 mg po daily and Lisinopril 5 mg po daily. TSH 5.890. Currently taking Levothyroxine 25 mcg po daily. Pt endorses cold intolerance (wearing a knit jacket today) and occasional fatigue. Cysto 1/2020 " "revealed essential hematuria. Discharged from [facility] 2/2019 due to unremarkable work-up. Bradycardia remains but pt is asymptomatic. She denies weakness, dizziness, vision changes, or syncope. No other problems stated.     Health Maintenance:   Colon Ca Screening-FIT negative, 6/3/2020   Breast Ca Screening-Mammo negative, 4/2019  Cervical Ca Screening-5/24/18-  NEGATIVE FOR INTRAEPITHELIAL LESION OR MALIGNANCY. NIL     (12/30/2020): 60 y.o. AAF with a PmHx of HTN, Hypothyroidism, right-sided thyroid nodules, MSH (following Urology), back pain, and Sinus Bradycardia, presenting for routine f/u. Bp at goal today. Currently taking Amlodipine 10 mg po daily and Lisinopril 5 mg po daily. HR remains in 50s. Asymptomatic. Pt referred to ENT earlier this year for thyroid nodules. She was seen in clinic and FNA was planned. After re-review, it was concluded that nodules were likely subcm and plans are to follow with annual US, due 7/2021. She underwent mammo 11/6/2020 that was negative. FIT negative 6/2020. Denies any bloody stools or diarrhea. States bowels move well with drinking coffee. If not drinking coffee, states bowels move "a little bit." This is not new. Asks, "What can I take for that?"      Lab review:   COVID + 11/27/2020   Total WBC count and abs neutrophils decreased. Past peripheral smear revealed WBC morphology WNL.   Hgb slightly decreased   TSH decreased 0.0834, Free T4 upper normal. Denies overt s/s of hyperthyroidism.      She is amenable to receiving the Tdap but not sure about flu or shingles. States will call if she changes her mind.   No other problems stated.     (6/30/2021): 61 y.o. AAF with a PmHx of HTN, Hypothyroidism, right-sided thyroid nodules, MSH (following Urology), back pain, and Sinus Bradycardia, presenting for routine f/u. Bp at goal today. Currently taking Amlodipine 10 mg po daily and Lisinopril 5 mg po daily. HR 50s. Remains asymptomatic. Previously evaluated by Cards. She is " following ENT for multinodular thyroid. Repeat US 7/19/21. She has an appt with ENT 7/8, but plans to re-schedule to after US as she won't be available. Mammo scheduled 11/2021.  Lab review:   Total WBC count decreased. Past peripheral smear revealed WBC morphology WNL.   Total RBC count and Hgb slightly decreased   Vitamin D 28.2   Tota cholesterol 229,    TSH 26.2402; previously 0.0834   HgA1c 5.6%   Abnl UA. No significant growth on urine culture. Previously evaluated by Uro for MSH. S/p Cysto 1/2020.   She has no acute concerns.     (2/17/2022): 62 y.o. AAF with a PmHx of HTN, Hypothyroidism, right-sided thyroid nodules, MSH (following Urology), back pain, and Sinus Bradycardia, presenting for routine f/u. HR remains decreased; currently 40s. Pt denies dizziness, weakness, fatigue, syncope. She was evaluated by Cards in 2019. She had a negative work-up. Was told to f/u PRN. Today, patient has no concerns. Chol 209 (improved from 229 in 6/21) and  (improved from 143 in 6/21). TSH 4.56 (improved from previous 26 in 6/21). Reports compliance with medications. No other concerns today.     Health Maintenance:   Colon Ca Screening-FIT negative, 6/3/2020   Breast Ca Screening-Mammo negative, 11/6/2020   Cervical Ca Screening-1/25/21-  NEGATIVE FOR INTRAEPITHELIAL LESION OR MALIGNANCY. NIL; high risk HPV negative     (8/17/2022): 62 y.o. AAF with a PmHx of HTN, high cholesterol, Hypothyroidism, right-sided thyroid nodules, MSH (following Urology), back pain, and Sinus Bradycardia, presenting for routine f/u. Previously referred to cards for bradycardia. Now following Dr. Almaraz with [facility]. Reports she was taken off of Amlodipine and Lisinopril was titrated to 20 mg po daily. Bp at goal. States scheduled for cardiac testing next month and will f/u with Dr. Almaraz one week later for results. Labs reviewed: Total WBC count and abs neutrophil remain chronically subnormal. Last peripheral smear normal. Patho  "cancelled recently ordered peripheral smear due to not meeting criteria; TSH WNL; Vitamin D slightly decreased. Patient is due for colon cancer screening. She denies any acute concerns.     (9/14/2022): 62 y.o. AAF with a PmHx of HTN, high cholesterol, Hypothyroidism, right-sided thyroid nodules, MSH (following Urology), back pain, and Sinus Bradycardia, presenting for routine ED f/u. She presented to ED on 9/2/22 with c/o generalized headaches and lower back pain. She'd mentioned recently being taken off of Amlodipine per Cards, thinking it was likely contributing. Bp 144/57 in ED. She is taking Lisinopril 30 mg po daily, and Bp was at goal at August appt. She was given Toradol and Methocarbamol in ED. Discharged with Rx for IBU, Fioricet, and Zanaflex. Patient admits that she didn't fill any prescriptions because she's feeling "a lot better." States Toradol was helpful in ED. Back pain is resolved and Headache much improved.      Requesting letter to join aerobics class.     No other concerns.     (2/8/2023): 63 y.o. AAF with a PmHx of HTN, high cholesterol, Hypothyroidism, right-sided thyroid nodules, MSH (following Urology), back pain, and Sinus Bradycardia, presenting for routine f/u. She completed labs which are stable overall. Her total cholesterol is slightly elevated at 211. Total WBC count remains subnormal but stable compared to baseline. She also continues with occult blood in urine. S/p cysto 1/2020 which was negative. Denies gross hematuria. She did visit the ED twice in December d/t flu A, then a subsequent syncopal episode r/t flu. She's completely recovered at this time. She is due for her mammogram next month, colon cancer screening, and DXA. Amenable to all. Declines vaccines.     Today's Visit (8/30/2023): 63 y.o. AAF with a PmHx of HTN, high cholesterol, Hypothyroidism, right-sided thyroid nodules, MSH (following Urology), back pain, and Sinus Bradycardia, presenting for routine f/u. She " "completed labs this am. Labs continue to demonstrate total WBC count remains subnormal but stable compared to baseline. A peripheral smear is pending. She also continues with occult blood in urine. S/p cysto 1/2020 which was negative and again 6/7/23 which revealed:  Cystoscopy:        - Urethral meatus:  No strictures         - Urethra:  Normal without strictures or lesions         - Bladder neck:  Normal         - Bladder:  No mucosal abnormalities         - Ureteral orifices:  On the trigone with clear efflux bilaterally. Denies gross hematuria. TFTs and CMP unremarkable. She did visit the ED in April with COVID.  She had a negative MMG in March and a DXA revealing osteopenia. Cologuard ordered in February was never completed. Admits that the kit is in her closet.    Her VS are stable. She has stable, asymptomatic bradycardia. Follows Cards. Has seen Dr. Tutu Miller's () team for leg complaints. Last visit this month. States told everything "fine."     No acute concerns.        Review of Systems     Review of Systems   Constitutional: Negative.  Negative for fatigue, fever and unexpected weight change.   HENT: Negative.     Eyes: Negative.    Respiratory: Negative.  Negative for apnea, cough, choking, chest tightness, shortness of breath, wheezing and stridor.    Cardiovascular: Negative.  Negative for chest pain, palpitations and leg swelling.   Gastrointestinal: Negative.    Endocrine: Negative.    Genitourinary: Negative.    Musculoskeletal: Negative.  Negative for arthralgias, back pain, gait problem, joint swelling, myalgias, neck pain and neck stiffness.   Skin: Negative.  Negative for color change, pallor, rash and wound.   Allergic/Immunologic: Negative.    Neurological: Negative.  Negative for dizziness, tremors, seizures, syncope, facial asymmetry, speech difficulty, weakness, light-headedness, numbness and headaches.   Hematological: Negative.  Negative for adenopathy. Does not bruise/bleed easily. " "  Psychiatric/Behavioral: Negative.  Negative for self-injury, sleep disturbance and suicidal ideas. The patient is not nervous/anxious.        Medical / Social / Family History     Past Medical History:   Diagnosis Date    Bradycardia     Chronic back pain     Essential (primary) hypertension     Hypothyroidism, unspecified     Menopause     Microscopic hematuria     Nontoxic single thyroid nodule     Sleep disorder not due to a substance or known physiological condition, unspecified        Past Surgical History:   Procedure Laterality Date    NO PAST SURGERIES         Social History  Ms.  reports that she has never smoked. She has been exposed to tobacco smoke. She has never used smokeless tobacco. She reports that she does not drink alcohol and does not use drugs.    Family History  Ms.'s family history includes Diabetes type II in her mother; Hypertension in her father and mother.    Medications and Allergies     Medications  Current Outpatient Medications   Medication Instructions    blood pressure monitor (BLOOD PRESSURE KIT) Kit Use daily to assess blood pressure    fluticasone propionate (FLONASE) 50 mcg, Each Nostril, 2 times daily PRN    ibuprofen (ADVIL,MOTRIN) 800 mg, Oral, Every 6 hours PRN    levothyroxine (SYNTHROID) 75 mcg, Oral, Before breakfast    lisinopriL (PRINIVIL,ZESTRIL) 20 mg, Oral, Daily       Allergies  Review of patient's allergies indicates:   Allergen Reactions    Hydrochlorothiazide      Other reaction(s): rash  Rash    Hydrocortisone Other (See Comments)    Sulfamethoxazole-trimethoprim Other (See Comments)    Hydrobenzthiazide Rash    Sulfa (sulfonamide antibiotics) Rash    Sulfur Rash       Physical Examination   Visit Vitals  /60 (BP Location: Left arm, Patient Position: Sitting, BP Method: Medium (Automatic))   Pulse (!) 56   Temp 98 °F (36.7 °C) (Oral)   Resp 20   Ht 5' 8" (1.727 m)   Wt 67.8 kg (149 lb 6.4 oz)   BMI 22.72 kg/m²     Physical Exam  Constitutional:       " Appearance: Normal appearance.   HENT:      Head: Normocephalic and atraumatic.      Right Ear: External ear normal.      Left Ear: External ear normal.   Eyes:      Extraocular Movements: Extraocular movements intact.      Conjunctiva/sclera: Conjunctivae normal.   Cardiovascular:      Rate and Rhythm: Regular rhythm. Bradycardia present.      Pulses: Normal pulses.      Heart sounds: Normal heart sounds.   Pulmonary:      Effort: Pulmonary effort is normal.      Breath sounds: Normal breath sounds.   Abdominal:      General: Bowel sounds are normal.      Palpations: Abdomen is soft.   Musculoskeletal:         General: Normal range of motion.      Right lower leg: No edema.      Left lower leg: No edema.   Skin:     General: Skin is warm and dry.   Neurological:      General: No focal deficit present.      Mental Status: She is alert and oriented to person, place, and time.   Psychiatric:         Mood and Affect: Mood normal.         Behavior: Behavior normal.         Thought Content: Thought content normal.         Judgment: Judgment normal.           Results     Chemistry:  Lab Results   Component Value Date     08/30/2023    K 4.4 08/30/2023    CHLORIDE 107 08/30/2023    BUN 13.2 08/30/2023    CREATININE 0.72 08/30/2023    EGFRNORACEVR >60 08/30/2023    GLUCOSE 83 08/30/2023    CALCIUM 9.8 08/30/2023    ALKPHOS 75 08/30/2023    LABPROT 7.7 (H) 08/30/2023    ALBUMIN 4.0 08/30/2023    BILIDIR 0.2 12/03/2021    IBILI 0.20 12/03/2021    AST 19 08/30/2023    ALT 12 08/30/2023    MG 2.20 12/11/2022    HFHXBPSR54UM 57.3 08/30/2023        Lab Results   Component Value Date    HGBA1C 5.6 06/25/2021        Hematology:  Lab Results   Component Value Date    WBC 3.02 (L) 08/30/2023    HGB 12.0 08/30/2023    HCT 37.5 08/30/2023     08/30/2023       Lipid Panel:  Lab Results   Component Value Date    CHOL 211 (H) 02/06/2023    HDL 73 (H) 02/06/2023    .00 02/06/2023    TRIG 43 02/06/2023    TOTALCHOLEST 3  02/06/2023        Urine:  Lab Results   Component Value Date    COLORUA Light-Yellow 02/06/2023    APPEARANCEUA Clear 02/06/2023    SGUA 1.015 02/06/2023    PHUA 6.5 02/06/2023    PROTEINUA Negative 02/06/2023    GLUCOSEUA Normal 02/06/2023    KETONESUA Negative 02/06/2023    BLOODUA 1+ (A) 02/06/2023    NITRITESUA Negative 02/06/2023    LEUKOCYTESUR Negative 02/06/2023    RBCUA 0-5 02/06/2023    WBCUA 0-5 02/06/2023    BACTERIA Trace (A) 02/06/2023    SQEPUA Trace (A) 02/06/2023    HYALINECASTS None Seen 02/06/2023          Assessment        ICD-10-CM ICD-9-CM   1. Screening for colon cancer  Z12.11 V76.51   2. Wellness examination  Z00.00 V70.0   3. Hypothyroidism, unspecified type  E03.9 244.9   4. Hypertension, unspecified type  I10 401.9   5. Bradycardia  R00.1 427.89   6. Microscopic hematuria  R31.29 599.72   7. Other neutropenia  D70.8 288.09        Plan (including Health Maintenance)     Problem List Items Addressed This Visit          Cardiac/Vascular    Hypertension    Overview     Lisinopril 20 mg po daily         Current Assessment & Plan     At goal  Continue current regimen. Monitor for s/s of angioedema. To ED if such occurs  Educated on aerobic exercise (3-5 days/week) and a low-fat, low-sodium diet  Avoid excess ETOH consumption. Smoking cessation if applicable  ED precautions (s/s of CVA, etc)           Relevant Medications    lisinopriL (PRINIVIL,ZESTRIL) 20 MG tablet    Bradycardia    Current Assessment & Plan     Asymptomatic            Renal/    Microscopic hematuria    Overview     Cysto 1/2020 revealed essential hematuria   6/7/23 which revealed:  Cystoscopy:     - Urethral meatus:  No strictures      - Urethra:  Normal without strictures or lesions      - Bladder neck:  Normal      - Bladder:  No mucosal abnormalities      - Ureteral orifices:  On the trigone with clear efflux bilaterally.            Oncology    Other neutropenia    Overview     * Final Report *  RBC, WBC and platelet  morphology within normal limits.  Tri Gonzalez MD.  Result type: Perif Smear Eval  Result date: December 28, 2020 8:50 CST  Result status: Auth (Verified)  Performed by: Concha Isaac on January 01, 2021 17:15 CST  Verified by: Tri Gonzalez MD on January 04, 2021 10:58 CST  Encounter info: [id number], [facility], Outpatient, 12/28/2020 - 12/28/2020           Current Assessment & Plan     Overall, WBC, abs neutrophil, and RBC counts stable  Peripheral smear pending            Endocrine    Hypothyroidism    Current Assessment & Plan     TFTs WNL  Continue LT4 75 mcg po daily         Relevant Medications    levothyroxine (SYNTHROID) 75 MCG tablet       GI    Screening for colon cancer - Primary    Current Assessment & Plan     Enc to return Cologuard            Other    Wellness examination    Overview     Osteoporosis Screening: DXA 3/14/23,    Osteopenia.  Moderately increased fracture risk.  Colon cancer screening: FIT negative, 6/3/2020  Breast cancer screening: Mammogram BI-RADS: 1 Negative, 3/11/22; Mammogram BI-RADS: 1 Negative, 3/14/23  Cervical Ca Screening-5/24/18- NEGATIVE FOR INTRAEPITHELIAL LESION OR MALIGNANCY. NIL, 1/25/21 NIL, high-risk HPV negative                Health Maintenance Due   Topic Date Due    Colorectal Cancer Screening  Never done    COVID-19 Vaccine (6 - Mixed Product series) 01/12/2022       Future Appointments   Date Time Provider Department Center   12/6/2023  9:00 AM Thom Martini, [place]    3/5/2024  7:30 AM [facility]gs [facility]    For any new or worsening symptoms that are urgent, or for any s/s of MI, CVA, or any other emergent concerns, please visit the ER for further eval. Otherwise, call clinic with questions or concerns.      Follow up in about 6 months (around 2/29/2024).    Signature:  BERTHA Quispe  [facility]  [address] [facility]  [id number]    Date of encounter: 8/30/23

## 2023-08-30 NOTE — ASSESSMENT & PLAN NOTE
At goal  Continue current regimen. Monitor for s/s of angioedema. To ED if such occurs  Educated on aerobic exercise (3-5 days/week) and a low-fat, low-sodium diet  Avoid excess ETOH consumption. Smoking cessation if applicable  ED precautions (s/s of CVA, etc)

## 2023-09-05 ENCOUNTER — HOSPITAL ENCOUNTER (EMERGENCY)
Facility: HOSPITAL | Age: 63
Discharge: HOME | End: 2023-09-05
Attending: STUDENT IN AN ORGANIZED HEALTH CARE EDUCATION/TRAINING PROGRAM
Payer: MEDICARE

## 2023-09-05 VITALS
BODY MASS INDEX: 22.79 KG/M2 | OXYGEN SATURATION: 99 % | RESPIRATION RATE: 18 BRPM | WEIGHT: 149.94 LBS | TEMPERATURE: 98 F | DIASTOLIC BLOOD PRESSURE: 73 MMHG | SYSTOLIC BLOOD PRESSURE: 159 MMHG | HEART RATE: 50 BPM

## 2023-09-05 DIAGNOSIS — M17.11 PRIMARY OSTEOARTHRITIS OF RIGHT KNEE: Primary | ICD-10-CM

## 2023-09-05 PROCEDURE — 99283 EMERGENCY DEPT VISIT LOW MDM: CPT

## 2023-09-05 PROCEDURE — 25000003 PHARM REV CODE 250: Performed by: PHYSICIAN ASSISTANT

## 2023-09-05 RX ORDER — KETOROLAC TROMETHAMINE 10 MG/1
10 TABLET, FILM COATED ORAL
Status: COMPLETED | OUTPATIENT
Start: 2023-09-05 | End: 2023-09-05

## 2023-09-05 RX ORDER — MELOXICAM 7.5 MG/1
7.5 TABLET ORAL DAILY PRN
Qty: 15 TABLET | Refills: 0 | Status: SHIPPED | OUTPATIENT
Start: 2023-09-05 | End: 2023-10-04

## 2023-09-05 RX ADMIN — KETOROLAC TROMETHAMINE 10 MG: 10 TABLET, FILM COATED ORAL at 09:09

## 2023-09-05 NOTE — ED PROVIDER NOTES
Encounter Date: 9/5/2023       History     Chief Complaint   Patient presents with    Knee Pain     CO RT KNEE PAIN X 1 WK.  DENIES INJURY.      Patient with pmhx of HTN, sinus bradycardia, and chronic back pain presents today c/o right knee pain for 1 week. Pain is worse with movement. She has tried Advil at home with no improvement. Denies injury or trauma.     The history is provided by the patient. No  was used.     Review of patient's allergies indicates:   Allergen Reactions    Hydrochlorothiazide      Other reaction(s): rash  Rash    Hydrocortisone Other (See Comments)    Sulfamethoxazole-trimethoprim Other (See Comments)    Hydrobenzthiazide Rash    Sulfa (sulfonamide antibiotics) Rash    Sulfur Rash     Past Medical History:   Diagnosis Date    Bradycardia     Chronic back pain     Essential (primary) hypertension     Hypothyroidism, unspecified     Menopause     Microscopic hematuria     Nontoxic single thyroid nodule     Sleep disorder not due to a substance or known physiological condition, unspecified      Past Surgical History:   Procedure Laterality Date    NO PAST SURGERIES       Family History   Problem Relation Age of Onset    Diabetes type II Mother     Hypertension Mother     Hypertension Father      Social History     Tobacco Use    Smoking status: Never     Passive exposure: Past    Smokeless tobacco: Never   Substance Use Topics    Alcohol use: Never    Drug use: Never     Review of Systems   Constitutional:  Negative for chills and fever.   Respiratory:  Negative for cough, chest tightness and shortness of breath.    Cardiovascular:  Negative for chest pain, palpitations and leg swelling.   Gastrointestinal:  Negative for abdominal pain, nausea and vomiting.   Genitourinary:  Negative for dysuria, flank pain and hematuria.   Musculoskeletal:         Right knee pain   Skin:  Negative for rash.   Neurological:  Negative for syncope, light-headedness and headaches.   All  other systems reviewed and are negative.      Physical Exam     Initial Vitals [09/05/23 0903]   BP Pulse Resp Temp SpO2   (!) 159/73 (!) 50 18 97.9 °F (36.6 °C) 99 %      MAP       --         Physical Exam    Nursing note and vitals reviewed.  Constitutional: She appears well-developed and well-nourished. She is not diaphoretic. No distress.   HENT:   Head: Normocephalic and atraumatic.   Mouth/Throat: Oropharynx is clear and moist. No oropharyngeal exudate.   Eyes: Conjunctivae and EOM are normal.   Neck: Neck supple.   Normal range of motion.  Cardiovascular:  Normal rate, regular rhythm, normal heart sounds and intact distal pulses.           Pulmonary/Chest: Breath sounds normal. No respiratory distress.   Abdominal: Abdomen is soft. She exhibits no distension.   Musculoskeletal:      Cervical back: Normal range of motion and neck supple.      Comments: Right knee non-ttp. There is no significant swelling. No overlying erythema, warmth, or ecchymosis. Full AROM of right knee, but increased pain with movement. NVI, 2+ distal pulses.     Neurological: She is alert and oriented to person, place, and time. GCS score is 15. GCS eye subscore is 4. GCS verbal subscore is 5. GCS motor subscore is 6.   Skin: Skin is warm and dry. Capillary refill takes less than 2 seconds. No rash noted.   Psychiatric: She has a normal mood and affect.         ED Course   Procedures  Labs Reviewed - No data to display       Imaging Results              X-Ray Knee 3 View Right (Final result)  Result time 09/05/23 09:38:33      Final result by Red Healy MD (09/05/23 09:38:33)                   Impression:      Degenerative changes as above.      Electronically signed by: Red Healy  Date:    09/05/2023  Time:    09:38               Narrative:    EXAMINATION:  XR KNEE 3 VIEW RIGHT    CLINICAL HISTORY:  Pain in unspecified knee    TECHNIQUE:  AP, lateral, and Merchant views of the right knee were  performed.    COMPARISON:  03/14/2023    FINDINGS:  Degenerative changes with tricompartmental osteophytes.  There is mild loss of joint space of the medial greater than lateral compartment.  Mild lateral translocation of the tibia in relation to the femur.                                       Medications   ketorolac tablet 10 mg (10 mg Oral Given 9/5/23 0912)     Medical Decision Making  Patient with atraumatic right knee pain for 1 week.    Ddx: osteoarthritis, inflammatory arthritis, knee strain vs sprain, internal derangement of knee       Patient is non-toxic appearing. Vitals stable. X-ray reviewed and results discussed. Advised to f/u with pcp. Stable for discharge. ED precautions given.     Amount and/or Complexity of Data Reviewed  External Data Reviewed: notes.  Radiology: ordered. Decision-making details documented in ED Course.    Risk  Prescription drug management.         APC / Resident Notes:   I was not physically present during the history, exam or disposition of this patient. I was available at all times for consultation. (Zmora)        ED Course as of 09/05/23 1923 Tue Sep 05, 2023   0946 X-Ray Knee 3 View Right [SA]      ED Course User Index  [SA] Sona Sinha PA                    Clinical Impression:   Final diagnoses:  [M17.11] Primary osteoarthritis of right knee (Primary)        ED Disposition Condition    Discharge Stable          ED Prescriptions       Medication Sig Dispense Start Date End Date Auth. Provider    meloxicam (MOBIC) 7.5 MG tablet Take 1 tablet (7.5 mg total) by mouth daily as needed for Pain. 15 tablet 9/5/2023 -- Sona Sinha PA          Follow-up Information       Follow up With Specialties Details Why Contact Info    [facility] Emergency Medicine  If symptoms worsen return to ED immediately [address] [facility] [address] [phone]    Mikey Biggs FNP Family Medicine In 2 days  [address]  [facility] [id number]  [phone]               Sona Sinha PA  09/05/23 0949       Humble Portillo MD  09/05/23 1924

## 2023-09-20 ENCOUNTER — TELEPHONE (OUTPATIENT)
Dept: INTERNAL MEDICINE | Facility: CLINIC | Age: 63
End: 2023-09-20
Payer: MEDICARE

## 2023-09-22 RX ORDER — LOSARTAN POTASSIUM 25 MG/1
25 TABLET ORAL DAILY
Qty: 90 TABLET | Refills: 1 | Status: SHIPPED | OUTPATIENT
Start: 2023-09-22 | End: 2023-10-04

## 2023-09-29 ENCOUNTER — TELEPHONE (OUTPATIENT)
Dept: INTERNAL MEDICINE | Facility: CLINIC | Age: 63
End: 2023-09-29
Payer: MEDICARE

## 2023-09-29 ENCOUNTER — HOSPITAL ENCOUNTER (EMERGENCY)
Facility: HOSPITAL | Age: 63
Discharge: HOME | End: 2023-09-29
Attending: INTERNAL MEDICINE
Payer: MEDICARE

## 2023-09-29 VITALS
HEART RATE: 57 BPM | RESPIRATION RATE: 18 BRPM | WEIGHT: 154.31 LBS | HEIGHT: 68 IN | BODY MASS INDEX: 23.39 KG/M2 | DIASTOLIC BLOOD PRESSURE: 93 MMHG | SYSTOLIC BLOOD PRESSURE: 180 MMHG | TEMPERATURE: 98 F | OXYGEN SATURATION: 100 %

## 2023-09-29 DIAGNOSIS — M25.561 CHRONIC PAIN OF RIGHT KNEE: Primary | ICD-10-CM

## 2023-09-29 DIAGNOSIS — G89.29 CHRONIC PAIN OF RIGHT KNEE: Primary | ICD-10-CM

## 2023-09-29 PROCEDURE — 63600175 PHARM REV CODE 636 W HCPCS: Performed by: NURSE PRACTITIONER

## 2023-09-29 PROCEDURE — 99284 EMERGENCY DEPT VISIT MOD MDM: CPT

## 2023-09-29 PROCEDURE — 96372 THER/PROPH/DIAG INJ SC/IM: CPT | Performed by: NURSE PRACTITIONER

## 2023-09-29 RX ORDER — KETOROLAC TROMETHAMINE 30 MG/ML
30 INJECTION, SOLUTION INTRAMUSCULAR; INTRAVENOUS
Status: COMPLETED | OUTPATIENT
Start: 2023-09-29 | End: 2023-09-29

## 2023-09-29 RX ADMIN — KETOROLAC TROMETHAMINE 30 MG: 30 INJECTION, SOLUTION INTRAMUSCULAR; INTRAVENOUS at 10:09

## 2023-09-29 NOTE — ED PROVIDER NOTES
Encounter Date: 9/29/2023       History     Chief Complaint   Patient presents with    Knee Pain     CONTINUED RT KNEE PAIN > 1 MONTH.  RX MED DID NOT HELP.  REQUESTING PAIN SHOT AND STEROIDS.      The patient presents with right knee pain. The onset was chronic.  The course/duration of symptoms is constant. Type of injury: none and none known. Location: Right knee. The character of symptoms is pain and swelling.  The degree at present is moderate. There are exacerbating factors including movement, weight bearing and walking.  The relieving factor is rest. Risk factors consist of none. Prior episodes: none. Therapy today: none. Associated symptoms: none. She was seen here on 9/5 with xray. She is taking meloxicam for pain. She is here requesting IM pain medication.      Review of patient's allergies indicates:   Allergen Reactions    Hydrochlorothiazide      Other reaction(s): rash  Rash    Hydrocortisone Other (See Comments)    Lisinopril Edema    Sulfamethoxazole-trimethoprim Other (See Comments)    Hydrobenzthiazide Rash    Sulfa (sulfonamide antibiotics) Rash    Sulfur Rash     Past Medical History:   Diagnosis Date    Bradycardia     Chronic back pain     Essential (primary) hypertension     Hypothyroidism, unspecified     Menopause     Microscopic hematuria     Nontoxic single thyroid nodule     Sleep disorder not due to a substance or known physiological condition, unspecified      Past Surgical History:   Procedure Laterality Date    NO PAST SURGERIES       Family History   Problem Relation Age of Onset    Diabetes type II Mother     Hypertension Mother     Hypertension Father      Social History     Tobacco Use    Smoking status: Never     Passive exposure: Past    Smokeless tobacco: Never   Substance Use Topics    Alcohol use: Never    Drug use: Never     Review of Systems   Constitutional:  Negative for fever.   HENT:  Negative for sore throat.    Respiratory:  Negative for shortness of breath.     Cardiovascular:  Negative for chest pain.   Gastrointestinal:  Negative for nausea.   Genitourinary:  Negative for dysuria.   Musculoskeletal:  Positive for arthralgias. Negative for back pain.   Skin:  Negative for rash.   Neurological:  Negative for weakness.   Hematological:  Does not bruise/bleed easily.   All other systems reviewed and are negative.      Physical Exam     Initial Vitals [09/29/23 1000]   BP Pulse Resp Temp SpO2   (!) 180/93 (!) 57 16 97.9 °F (36.6 °C) 100 %      MAP       --         Physical Exam    Nursing note and vitals reviewed.  Constitutional: She appears well-developed and well-nourished.   HENT:   Head: Normocephalic and atraumatic.   Neck: Neck supple.   Normal range of motion.  Cardiovascular:  Normal rate, regular rhythm, normal heart sounds and intact distal pulses.           Pulmonary/Chest: Breath sounds normal.   Abdominal: Abdomen is soft. Bowel sounds are normal.   Musculoskeletal:         General: Normal range of motion.      Cervical back: Normal range of motion and neck supple.      Comments: Mild ttp without swelling right knee, FROM, good distal pulses, NVI     Neurological: She is alert. She has normal strength.   Skin: Skin is warm and dry.   Psychiatric: She has a normal mood and affect.         ED Course   Procedures  Labs Reviewed - No data to display       Imaging Results    None          Medications   ketorolac injection 30 mg (has no administration in time range)     Medical Decision Making  The patient presents with right knee pain. The onset was chronic.  The course/duration of symptoms is constant. Type of injury: none and none known. Location: Right knee. The character of symptoms is pain and swelling.  The degree at present is moderate. There are exacerbating factors including movement, weight bearing and walking.  The relieving factor is rest. Risk factors consist of none. Prior episodes: none. Therapy today: none. Associated symptoms: none. She was seen  here on 9/5 with xray. She is taking meloxicam for pain. She is here requesting IM pain medication.    10:37 AM DISPOSITION: The patient is resting comfortably in no acute distress.  She is hemodynamically stable and is without objective evidence for acute process requiring urgent intervention or hospitalization. I provided counseling to patient with regard to condition, the treatment plan, specific conditions for return, and the importance of follow up. Detailed written and verbal instructions provided to patient and she expressed a verbal understanding of the discharge instructions and overall management plan. Reiterated the importance of medication administration and safety and advised patient to follow up with primary care provider in 3-5 days or sooner if needed.  Answered questions at this time. The patient is stable for discharge.         Additional MDM:   Differential Diagnosis:   Fracture, septic joint, cellulitis, abscess, gout, RA, OA among others                              Clinical Impression:   Final diagnoses:  [M25.561, G89.29] Chronic pain of right knee (Primary)        ED Disposition Condition    Discharge Stable          ED Prescriptions    None       Follow-up Information       Follow up With Specialties Details Why Contact Info    Mikey Biggs FNP Family Medicine In 3 days  [address] [facility] [address]  [phone]      [facility] Emergency Medicine  If symptoms worsen [address] [facility] [place] [address] [phone]             Bentley Moreau, JOHNP  09/29/23 1038

## 2023-09-29 NOTE — TELEPHONE ENCOUNTER
----- Message from Dolores Mas sent at 9/29/2023 11:04 AM CDT -----  Patient is requesting a call. Contact number [phone]

## 2023-10-01 ENCOUNTER — HOSPITAL ENCOUNTER (EMERGENCY)
Facility: HOSPITAL | Age: 63
Discharge: HOME | End: 2023-10-01
Attending: STUDENT IN AN ORGANIZED HEALTH CARE EDUCATION/TRAINING PROGRAM
Payer: MEDICARE

## 2023-10-01 VITALS
DIASTOLIC BLOOD PRESSURE: 86 MMHG | OXYGEN SATURATION: 100 % | RESPIRATION RATE: 20 BRPM | TEMPERATURE: 97 F | BODY MASS INDEX: 23.88 KG/M2 | HEIGHT: 67 IN | SYSTOLIC BLOOD PRESSURE: 183 MMHG | WEIGHT: 152.13 LBS | HEART RATE: 48 BPM

## 2023-10-01 DIAGNOSIS — I10 HYPERTENSION: Primary | ICD-10-CM

## 2023-10-01 LAB
ALBUMIN SERPL-MCNC: 4 G/DL (ref 3.4–4.8)
ALBUMIN/GLOB SERPL: 1.2 RATIO (ref 1.1–2)
ALP SERPL-CCNC: 71 UNIT/L (ref 40–150)
ALT SERPL-CCNC: 18 UNIT/L (ref 0–55)
AST SERPL-CCNC: 25 UNIT/L (ref 5–34)
BASOPHILS # BLD AUTO: 0.03 X10(3)/MCL
BASOPHILS NFR BLD AUTO: 1.1 %
BILIRUB SERPL-MCNC: 0.6 MG/DL
BUN SERPL-MCNC: 11.3 MG/DL (ref 9.8–20.1)
CALCIUM SERPL-MCNC: 9.2 MG/DL (ref 8.4–10.2)
CHLORIDE SERPL-SCNC: 107 MMOL/L (ref 98–107)
CO2 SERPL-SCNC: 25 MMOL/L (ref 23–31)
CREAT SERPL-MCNC: 0.69 MG/DL (ref 0.55–1.02)
EOSINOPHIL # BLD AUTO: 0.1 X10(3)/MCL (ref 0–0.9)
EOSINOPHIL NFR BLD AUTO: 3.6 %
ERYTHROCYTE [DISTWIDTH] IN BLOOD BY AUTOMATED COUNT: 13.3 % (ref 11.5–17)
GFR SERPLBLD CREATININE-BSD FMLA CKD-EPI: >60 MLS/MIN/1.73/M2
GLOBULIN SER-MCNC: 3.4 GM/DL (ref 2.4–3.5)
GLUCOSE SERPL-MCNC: 78 MG/DL (ref 82–115)
HCT VFR BLD AUTO: 35.9 % (ref 37–47)
HGB BLD-MCNC: 11.4 G/DL (ref 12–16)
IMM GRANULOCYTES # BLD AUTO: 0 X10(3)/MCL (ref 0–0.04)
IMM GRANULOCYTES NFR BLD AUTO: 0 %
LYMPHOCYTES # BLD AUTO: 0.95 X10(3)/MCL (ref 0.6–4.6)
LYMPHOCYTES NFR BLD AUTO: 34.2 %
MCH RBC QN AUTO: 28.4 PG (ref 27–31)
MCHC RBC AUTO-ENTMCNC: 31.8 G/DL (ref 33–36)
MCV RBC AUTO: 89.3 FL (ref 80–94)
MONOCYTES # BLD AUTO: 0.24 X10(3)/MCL (ref 0.1–1.3)
MONOCYTES NFR BLD AUTO: 8.6 %
NEUTROPHILS # BLD AUTO: 1.46 X10(3)/MCL (ref 2.1–9.2)
NEUTROPHILS NFR BLD AUTO: 52.5 %
NRBC BLD AUTO-RTO: 0 %
PLATELET # BLD AUTO: 226 X10(3)/MCL (ref 130–400)
PMV BLD AUTO: 9.4 FL (ref 7.4–10.4)
POTASSIUM SERPL-SCNC: 3.2 MMOL/L (ref 3.5–5.1)
PROT SERPL-MCNC: 7.4 GM/DL (ref 5.8–7.6)
RBC # BLD AUTO: 4.02 X10(6)/MCL (ref 4.2–5.4)
SODIUM SERPL-SCNC: 142 MMOL/L (ref 136–145)
TROPONIN I SERPL-MCNC: 0.01 NG/ML (ref 0–0.04)
WBC # SPEC AUTO: 2.78 X10(3)/MCL (ref 4.5–11.5)

## 2023-10-01 PROCEDURE — 84484 ASSAY OF TROPONIN QUANT: CPT | Performed by: STUDENT IN AN ORGANIZED HEALTH CARE EDUCATION/TRAINING PROGRAM

## 2023-10-01 PROCEDURE — 99284 EMERGENCY DEPT VISIT MOD MDM: CPT

## 2023-10-01 PROCEDURE — 85025 COMPLETE CBC W/AUTO DIFF WBC: CPT | Performed by: STUDENT IN AN ORGANIZED HEALTH CARE EDUCATION/TRAINING PROGRAM

## 2023-10-01 PROCEDURE — 93005 ELECTROCARDIOGRAM TRACING: CPT

## 2023-10-01 PROCEDURE — 80053 COMPREHEN METABOLIC PANEL: CPT | Performed by: STUDENT IN AN ORGANIZED HEALTH CARE EDUCATION/TRAINING PROGRAM

## 2023-10-01 RX ORDER — AMLODIPINE BESYLATE 5 MG/1
5 TABLET ORAL DAILY
Qty: 30 TABLET | Refills: 1 | Status: SHIPPED | OUTPATIENT
Start: 2023-10-01 | End: 2023-10-04 | Stop reason: DRUGHIGH

## 2023-10-01 NOTE — ED PROVIDER NOTES
Encounter Date: 10/1/2023       History     Chief Complaint   Patient presents with    Hypertension     C/o high blood pressure at home despite taking blood pressure meds states recently was placed on new blood pressure meds by md. Bp 213/93. asymptomatic     Patient presents to the emergency department due to hypertension.  She states recently they have changed some of her medicines around, and she was having a difficult time controlling her blood pressure.  At home it has been 200s over 100s.  She denies any associated chest pain shortness of breath blurry vision headaches focal weakness or numbness.  No difficulty speaking.  She reports taking her new medicines as prescribed.    The history is provided by the patient.     Review of patient's allergies indicates:   Allergen Reactions    Hydrochlorothiazide      Other reaction(s): rash  Rash    Hydrocortisone Other (See Comments)    Lisinopril Edema    Sulfamethoxazole-trimethoprim Other (See Comments)    Hydrobenzthiazide Rash    Sulfa (sulfonamide antibiotics) Rash    Sulfur Rash     Past Medical History:   Diagnosis Date    Bradycardia     Chronic back pain     Essential (primary) hypertension     Hypothyroidism, unspecified     Menopause     Microscopic hematuria     Nontoxic single thyroid nodule     Sleep disorder not due to a substance or known physiological condition, unspecified      Past Surgical History:   Procedure Laterality Date    NO PAST SURGERIES       Family History   Problem Relation Age of Onset    Diabetes type II Mother     Hypertension Mother     Hypertension Father      Social History     Tobacco Use    Smoking status: Never     Passive exposure: Past    Smokeless tobacco: Never   Substance Use Topics    Alcohol use: Never    Drug use: Never     Review of Systems   Constitutional:  Negative for chills and fever.   HENT:  Negative for congestion and sore throat.    Respiratory:  Negative for cough and shortness of breath.    Cardiovascular:   Negative for chest pain and palpitations.   Gastrointestinal:  Negative for abdominal pain and nausea.   Genitourinary:  Negative for dysuria and hematuria.   Musculoskeletal:  Negative for arthralgias and myalgias.   Neurological:  Negative for dizziness and weakness.       Physical Exam     Initial Vitals [10/01/23 1021]   BP Pulse Resp Temp SpO2   (!) 213/93 (!) 52 20 97.3 °F (36.3 °C) 100 %      MAP       --         Physical Exam    Nursing note and vitals reviewed.  Constitutional: She appears well-developed and well-nourished.   HENT:   Head: Normocephalic and atraumatic.   Eyes: EOM are normal. Pupils are equal, round, and reactive to light.   Neck: Neck supple.   Normal range of motion.  Cardiovascular:  Normal rate and regular rhythm.           Pulmonary/Chest: Breath sounds normal. No respiratory distress.   Abdominal: Abdomen is soft. There is no abdominal tenderness.   Musculoskeletal:         General: No edema. Normal range of motion.      Cervical back: Normal range of motion and neck supple.     Neurological: She is alert and oriented to person, place, and time.   Skin: Skin is warm and dry.         ED Course   Procedures  Labs Reviewed   COMPREHENSIVE METABOLIC PANEL - Abnormal; Notable for the following components:       Result Value    Potassium Level 3.2 (*)     Glucose Level 78 (*)     All other components within normal limits   CBC WITH DIFFERENTIAL - Abnormal; Notable for the following components:    WBC 2.78 (*)     RBC 4.02 (*)     Hgb 11.4 (*)     Hct 35.9 (*)     MCHC 31.8 (*)     Neut # 1.46 (*)     All other components within normal limits   TROPONIN I - Normal   CBC W/ AUTO DIFFERENTIAL    Narrative:     The following orders were created for panel order CBC auto differential.  Procedure                               Abnormality         Status                     ---------                               -----------         ------                     CBC with Differential[[id number]]        Abnormal            Final result                 Please view results for these tests on the individual orders.   EXTRA TUBES    Narrative:     The following orders were created for panel order EXTRA TUBES.  Procedure                               Abnormality         Status                     ---------                               -----------         ------                     Light Blue Top Hold[[id number]]                             In process                 Gold Top Hold[[id number]]                                   In process                   Please view results for these tests on the individual orders.   LIGHT BLUE TOP HOLD   GOLD TOP HOLD        ECG Results              EKG 12-lead (Final result)  Result time 10/01/23 13:33:22      Final result by Interface, Lab In [facility] (10/01/23 13:33:22)                   Narrative:    Test Reason : I10,    Vent. Rate : 047 BPM     Atrial Rate : 047 BPM     P-R Int : 194 ms          QRS Dur : 084 ms      QT Int : 466 ms       P-R-T Axes : 064 067 -37 degrees     QTc Int : 412 ms    Sinus bradycardia  LVH with repolarization abnormality  Abnormal ECG  When compared with ECG of 11-DEC-2022 09:19,  Vent. rate has decreased BY  24 BPM  Non-specific change in ST segment in Lateral leads  T wave inversion now evident in Inferior leads  Nonspecific T wave abnormality now evident in Anterior-lateral leads  Confirmed by Tutu Ni MD ([id number]) on 10/1/2023 1:33:15 PM    Referred By: [email or web address]   SELF           Confirmed By:Tutu Ni MD                                     EKG 12-LEAD (Final result)  Result time 10/09/23 14:23:53      Final result by Unknown User (10/09/23 14:23:53)                                      Imaging Results    None          Medications - No data to display  Medical Decision Making  Hypertensive but otherwise vital signs are stable.  EKGs without acute concerning ischemic changes.  CBC, CMP and troponin show no evidence of end-organ damage in her  otherwise unremarkable.  Given her asymptomatic hypertension without end-organ damage, no indication for emergent blood pressure management at this time.  We will prescribe patient amlodipine, and she is to follow up closely with the primary care physician, return precautions were given.    Amount and/or Complexity of Data Reviewed  Labs: ordered.                               Clinical Impression:   Final diagnoses:  [I10] Hypertension (Primary)        ED Disposition Condition    Discharge Stable          ED Prescriptions       Medication Sig Dispense Start Date End Date Auth. Provider    amLODIPine (NORVASC) 5 MG tablet () Take 1 tablet (5 mg total) by mouth once daily. 30 tablet 10/1/2023 10/4/2023 Ramón Tinajero MD          Follow-up Information       Follow up With Specialties Details Why Contact Info    Mikey Biggs FNP Family Medicine Call  For ED follow up [address] [facility] [address]  [phone]      [facility] Emergency Medicine Go to  If symptoms worsen [address] [facility] [id number] [phone]             Ramón Tinajero MD  10/10/23 2054

## 2023-10-04 ENCOUNTER — OFFICE VISIT (OUTPATIENT)
Dept: INTERNAL MEDICINE | Facility: CLINIC | Age: 63
End: 2023-10-04
Payer: MEDICARE

## 2023-10-04 VITALS
DIASTOLIC BLOOD PRESSURE: 82 MMHG | HEIGHT: 67 IN | HEART RATE: 64 BPM | RESPIRATION RATE: 20 BRPM | WEIGHT: 148.38 LBS | TEMPERATURE: 98 F | SYSTOLIC BLOOD PRESSURE: 162 MMHG | BODY MASS INDEX: 23.29 KG/M2

## 2023-10-04 DIAGNOSIS — M25.561 CHRONIC PAIN OF RIGHT KNEE: Primary | ICD-10-CM

## 2023-10-04 DIAGNOSIS — I10 HYPERTENSION, UNSPECIFIED TYPE: ICD-10-CM

## 2023-10-04 DIAGNOSIS — G89.29 CHRONIC PAIN OF RIGHT KNEE: Primary | ICD-10-CM

## 2023-10-04 PROCEDURE — 1159F PR MEDICATION LIST DOCUMENTED IN MEDICAL RECORD: ICD-10-PCS | Mod: CPTII,,, | Performed by: NURSE PRACTITIONER

## 2023-10-04 PROCEDURE — 3008F BODY MASS INDEX DOCD: CPT | Mod: CPTII,,, | Performed by: NURSE PRACTITIONER

## 2023-10-04 PROCEDURE — 99214 PR OFFICE/OUTPT VISIT, EST, LEVL IV, 30-39 MIN: ICD-10-PCS | Mod: S$PBB,,, | Performed by: NURSE PRACTITIONER

## 2023-10-04 PROCEDURE — 3288F PR FALLS RISK ASSESSMENT DOCUMENTED: ICD-10-PCS | Mod: CPTII,,, | Performed by: NURSE PRACTITIONER

## 2023-10-04 PROCEDURE — 3079F PR MOST RECENT DIASTOLIC BLOOD PRESSURE 80-89 MM HG: ICD-10-PCS | Mod: CPTII,,, | Performed by: NURSE PRACTITIONER

## 2023-10-04 PROCEDURE — 3079F DIAST BP 80-89 MM HG: CPT | Mod: CPTII,,, | Performed by: NURSE PRACTITIONER

## 2023-10-04 PROCEDURE — 4010F ACE/ARB THERAPY RXD/TAKEN: CPT | Mod: CPTII,,, | Performed by: NURSE PRACTITIONER

## 2023-10-04 PROCEDURE — 99215 OFFICE O/P EST HI 40 MIN: CPT | Mod: PBBFAC | Performed by: NURSE PRACTITIONER

## 2023-10-04 PROCEDURE — 4010F PR ACE/ARB THEARPY RXD/TAKEN: ICD-10-PCS | Mod: CPTII,,, | Performed by: NURSE PRACTITIONER

## 2023-10-04 PROCEDURE — 99214 OFFICE O/P EST MOD 30 MIN: CPT | Mod: S$PBB,,, | Performed by: NURSE PRACTITIONER

## 2023-10-04 PROCEDURE — 1160F RVW MEDS BY RX/DR IN RCRD: CPT | Mod: CPTII,,, | Performed by: NURSE PRACTITIONER

## 2023-10-04 PROCEDURE — 1101F PR PT FALLS ASSESS DOC 0-1 FALLS W/OUT INJ PAST YR: ICD-10-PCS | Mod: CPTII,,, | Performed by: NURSE PRACTITIONER

## 2023-10-04 PROCEDURE — 1159F MED LIST DOCD IN RCRD: CPT | Mod: CPTII,,, | Performed by: NURSE PRACTITIONER

## 2023-10-04 PROCEDURE — 1101F PT FALLS ASSESS-DOCD LE1/YR: CPT | Mod: CPTII,,, | Performed by: NURSE PRACTITIONER

## 2023-10-04 PROCEDURE — 1160F PR REVIEW ALL MEDS BY PRESCRIBER/CLIN PHARMACIST DOCUMENTED: ICD-10-PCS | Mod: CPTII,,, | Performed by: NURSE PRACTITIONER

## 2023-10-04 PROCEDURE — 3008F PR BODY MASS INDEX (BMI) DOCUMENTED: ICD-10-PCS | Mod: CPTII,,, | Performed by: NURSE PRACTITIONER

## 2023-10-04 PROCEDURE — 3288F FALL RISK ASSESSMENT DOCD: CPT | Mod: CPTII,,, | Performed by: NURSE PRACTITIONER

## 2023-10-04 PROCEDURE — 3077F SYST BP >= 140 MM HG: CPT | Mod: CPTII,,, | Performed by: NURSE PRACTITIONER

## 2023-10-04 PROCEDURE — 3077F PR MOST RECENT SYSTOLIC BLOOD PRESSURE >= 140 MM HG: ICD-10-PCS | Mod: CPTII,,, | Performed by: NURSE PRACTITIONER

## 2023-10-04 RX ORDER — LOSARTAN POTASSIUM 50 MG/1
50 TABLET ORAL DAILY
COMMUNITY
Start: 2023-09-29

## 2023-10-04 RX ORDER — AMLODIPINE BESYLATE 10 MG/1
10 TABLET ORAL DAILY
Qty: 90 TABLET | Refills: 3 | Status: SHIPPED | OUTPATIENT
Start: 2023-10-04 | End: 2024-10-03

## 2023-10-04 RX ORDER — MELOXICAM 15 MG/1
15 TABLET ORAL DAILY PRN
Status: ON HOLD | COMMUNITY
Start: 2023-09-29 | End: 2024-02-22 | Stop reason: HOSPADM

## 2023-10-04 NOTE — ASSESSMENT & PLAN NOTE
Heat/Ice therapy PRN  NSAIDs PRN (if not contraindicated)  Acetaminophen Arthritis Strength (if not contraindicated)  Aerobic Exercise  Weight Loss  Refer to Ortho  XR done 9/5/23

## 2023-10-04 NOTE — PATIENT INSTRUCTIONS
Tomer Whitaker,     If you are due for any health screening(s) below please notify me so we can arrange them to be ordered and scheduled. Most healthy patients at your age complete them, but you are free to accept or refuse.     If you can't do it, I'll definitely understand. If you can, I'd certainly appreciate it!    Tests to Keep You Healthy    Mammogram: Met on 3/14/2023  Colon Cancer Screening: ORDERED  Last Blood Pressure <= 139/89 (10/4/2023): Yes      Its time for your colon cancer screening     Colorectal cancer is one of the leading causes of cancer death for men and women but it doesnt have to be. Screenings can prevent colorectal cancer or find it early enough to treat and cure the disease.     Our records indicate that you may be overdue for colon cancer screening. A colonoscopy or stool screening test can help identify patients at risk for developing colon cancer. Cancer screenings save lives, so schedule yours today to stay healthy.     A colonoscopy is the preferred test for detecting colon cancer. It is needed only once every 10 years if results are negative. While you are sedated, a flexible, lighted tube with a tiny camera is inserted into the rectum and advanced through the colon to look for cancers.     An alternative screening test that is used at home and returned to the lab may also be used. It detects hidden blood in bowel movements which could indicate cancer in the colon. If results are positive, you will need a colonoscopy to determine if the blood is a sign of cancer. This type of follow up (diagnostic) colonoscopy usually requires additional copays as required by your insurance provider.     If you recently had your colon cancer screening performed outside of [facility], please let your Health care team know so that they can update your health record. Please contact your PCP if you have any questions.    Lets manage your high blood pressure     Your blood pressure was above  140/90 today during your visit. We recommend that you schedule a nurse visit in two weeks to check your blood pressure and discuss ways to support your health goals.     You can also manage your health and record your blood pressure from the comfort of home by keeping a daily blood pressure log. These results are shared with and reviewed by your provider. Please print this form (Daily Blood Pressure Log) to assist you in keeping track of your blood pressure at home.     Schedule your nurse visit in two weeks to learn more about how to track and manage high blood pressure.    Daily Blood Pressure Log    Name:__________________________________                  Date of Birth:_________    Average Blood Pressure:  __________      Date: Time  (a.m.) Blood  Pressure: Pulse  Rate: Time  (p.m.) Blood  Pressure : Pulse  Rate:   Sample 8:37 127/83 84

## 2023-10-04 NOTE — ASSESSMENT & PLAN NOTE
She does not want to go up on Losartan. Continue current dose  Will increase Amlodipine to 10 mg po daily  Educated on aerobic exercise (3-5 days/week) and a low-fat, low-sodium diet  Avoid excess ETOH consumption. Smoking cessation if applicable  ED precautions (s/s of CVA, etc)  RTC in 2 -4 weeks to re-check BP

## 2023-10-04 NOTE — PROGRESS NOTES
BERTHA Quispe   [facility]  [address] [facility] [address]      PATIENT NAME: Julianne Parker  : 1960  DATE: 10/4/23  MRN: [id number]        Reason for Visit / Chief Complaint: Follow-up (ED F/U) and Hypertension       History of Present Illness / Problem Focused Workflow     Julianne Parker presents to the clinic with Follow-up (ED F/U) and Hypertension     Initial Visit 18: 58 y.o. AAF presenting to the clinic to re-establish primary care. Previous PCP SOLIS Baez NP. Last OV 2018. PMHx significant for HTN, Hypothyroidism, and Sinus Bradycardia. Bp at goal today. Currently taking Amlodipine 10 mg po daily and Lisinopril 5 mg po daily. TSH 2.700 (2018). Currently taking Levothyroxine 25 mcg po daily. Denies s/s of hypo/hyperthyroidism. Seen by Cardio 2018. Work-up mostly negative for bradycardia. Pt remains asymptomatic. She is to f/u with Cardio PRN. Following Urology for microscopic hematuria. Plan is for cystoscopy in the future. CT Abd/Pelvis was unremarkable. Pt presented to ED 2018 w/ c/o right breast lump. Diagnostic MMG completed. Recommendations were to f/u with right breast US. US needed. She was a NS for US in September. She is requesting appt be rescheduled. Pt does states that she can no longer feel the lump. Denies fever, chills, night sweats, CP, SOB, Abd pain, or any other concerns today.     4/3/19: 58 y.o. AAF with PMHx significant for HTN, Hypothyroidism, and Sinus Bradycardia, presenting for f/u. Bp at goal today. Currently taking Amlodipine 10 mg po daily and Lisinopril 5 mg po daily. TSH 1.570 (WNL). Currently taking Levothyroxine 25 mcg po daily. Denies s/s of hypo/hyperthyroidism. Seen by Cardio 19. HR stable. Remains asymptomatic. F/u with Cardio PRN. Pt was contacted by GI lab to scheduled cysto in 2018 but she never followed-up. Number to lab provided. Pt states that she will  "call today to try and schedule procedure. Reports that she had a Sleep Study conducted in 2015. Reviewed results with pt which revealed primary snoring. She did not meet criteria for ILENE. Pt replied "I'm good," and expressed that she's not interested in doing any repeat studies at this time as her symptoms have not changed/worsened. MMG scheduled Tuesday, April 23rd, 2019. Reports that she will return FIT as soon as she can. Denies fever, chills, HA, CP, SOB, abd pain, peripheral swelling, B/B dysfunction, or any other concerns today.     (10/3/19): Pt presenting for 6-mth f/u, Hypothyroidism, and Sinus Bradycardia. Bp at goal today. Currently taking Amlodipine 10 mg po daily and Lisinopril 5 mg po daily. TSH 1.570 (WNL) 2/26/19. Currently taking Levothyroxine 25 mcg po daily. Denies s/s of hypo/hyperthyroidism. New TSH level pending. Pt did not complete labs. She is not fasting so she plans to come to lab on Monday. She is amenable to returning FIT (has refused in the past). Doesn't appear that she's called to schedule cysto for hx of MSH. She was a NS for procedure 7/2019. Will repeat urine studies. She is following Dr. Deleon for hx of chronic back pain. States that she needs an MRI but insurance won't approve it. Wondering if MRI can be ordered here. Informed pt that specific details must be completed/documented before insurance would approve MRI. Will need to request medical records. Denies fever, chills, HA, weakness, dizziness, CP, SOB, abd pain, peripheral swelling, B/B dysfunction, or any other concerns today.      Mammo 4/2019 benign     (6/12/2020): 60 y.o. AAF with a PmHx of HTN, Hypothyroidism, MSH (following Urology), back pain, and Sinus Bradycardia, presenting for routine f/u. Bp at goal today. Currently taking Amlodipine 10 mg po daily and Lisinopril 5 mg po daily. TSH 5.890. Currently taking Levothyroxine 25 mcg po daily. Pt endorses cold intolerance (wearing a knit jacket today) and occasional " "fatigue. Cysto 1/2020 revealed essential hematuria. Discharged from [facility] clinic 2/2019 due to unremarkable work-up. Bradycardia remains but pt is asymptomatic. She denies weakness, dizziness, vision changes, or syncope. No other problems stated.     Health Maintenance:   Colon Ca Screening-FIT negative, 6/3/2020   Breast Ca Screening-Mammo negative, 4/2019  Cervical Ca Screening-5/24/18-  NEGATIVE FOR INTRAEPITHELIAL LESION OR MALIGNANCY. NIL     (12/30/2020): 60 y.o. AAF with a PmHx of HTN, Hypothyroidism, right-sided thyroid nodules, MSH (following Urology), back pain, and Sinus Bradycardia, presenting for routine f/u. Bp at goal today. Currently taking Amlodipine 10 mg po daily and Lisinopril 5 mg po daily. HR remains in 50s. Asymptomatic. Pt referred to ENT earlier this year for thyroid nodules. She was seen in clinic and FNA was planned. After re-review, it was concluded that nodules were likely subcm and plans are to follow with annual US, due 7/2021. She underwent mammo 11/6/2020 that was negative. FIT negative 6/2020. Denies any bloody stools or diarrhea. States bowels move well with drinking coffee. If not drinking coffee, states bowels move "a little bit." This is not new. Asks, "What can I take for that?"      Lab review:   COVID + 11/27/2020   Total WBC count and abs neutrophils decreased. Past peripheral smear revealed WBC morphology WNL.   Hgb slightly decreased   TSH decreased 0.0834, Free T4 upper normal. Denies overt s/s of hyperthyroidism.      She is amenable to receiving the Tdap but not sure about flu or shingles. States will call if she changes her mind.   No other problems stated.     (6/30/2021): 61 y.o. AAF with a PmHx of HTN, Hypothyroidism, right-sided thyroid nodules, MSH (following Urology), back pain, and Sinus Bradycardia, presenting for routine f/u. Bp at goal today. Currently taking Amlodipine 10 mg po daily and Lisinopril 5 mg po daily. HR 50s. Remains asymptomatic. Previously " evaluated by Cards. She is following ENT for multinodular thyroid. Repeat US 7/19/21. She has an appt with ENT 7/8, but plans to re-schedule to after US as she won't be available. Mammo scheduled 11/2021.  Lab review:   Total WBC count decreased. Past peripheral smear revealed WBC morphology WNL.   Total RBC count and Hgb slightly decreased   Vitamin D 28.2   Tota cholesterol 229,    TSH 26.2402; previously 0.0834   HgA1c 5.6%   Abnl UA. No significant growth on urine culture. Previously evaluated by Uro for MSH. S/p Cysto 1/2020.   She has no acute concerns.     (2/17/2022): 62 y.o. AAF with a PmHx of HTN, Hypothyroidism, right-sided thyroid nodules, MSH (following Urology), back pain, and Sinus Bradycardia, presenting for routine f/u. HR remains decreased; currently 40s. Pt denies dizziness, weakness, fatigue, syncope. She was evaluated by Cards in 2019. She had a negative work-up. Was told to f/u PRN. Today, patient has no concerns. Chol 209 (improved from 229 in 6/21) and  (improved from 143 in 6/21). TSH 4.56 (improved from previous 26 in 6/21). Reports compliance with medications. No other concerns today.     Health Maintenance:   Colon Ca Screening-FIT negative, 6/3/2020   Breast Ca Screening-Mammo negative, 11/6/2020   Cervical Ca Screening-1/25/21-  NEGATIVE FOR INTRAEPITHELIAL LESION OR MALIGNANCY. NIL; high risk HPV negative     (8/17/2022): 62 y.o. AAF with a PmHx of HTN, high cholesterol, Hypothyroidism, right-sided thyroid nodules, MSH (following Urology), back pain, and Sinus Bradycardia, presenting for routine f/u. Previously referred to cards for bradycardia. Now following Dr. Almaraz with [facility]. Reports she was taken off of Amlodipine and Lisinopril was titrated to 20 mg po daily. Bp at goal. States scheduled for cardiac testing next month and will f/u with Dr. Almaraz one week later for results. Labs reviewed: Total WBC count and abs neutrophil remain chronically subnormal. Last peripheral  "smear normal. Patho cancelled recently ordered peripheral smear due to not meeting criteria; TSH WNL; Vitamin D slightly decreased. Patient is due for colon cancer screening. She denies any acute concerns.     (9/14/2022): 62 y.o. AAF with a PmHx of HTN, high cholesterol, Hypothyroidism, right-sided thyroid nodules, MSH (following Urology), back pain, and Sinus Bradycardia, presenting for routine ED f/u. She presented to ED on 9/2/22 with c/o generalized headaches and lower back pain. She'd mentioned recently being taken off of Amlodipine per Cards, thinking it was likely contributing. Bp 144/57 in ED. She is taking Lisinopril 30 mg po daily, and Bp was at goal at August appt. She was given Toradol and Methocarbamol in ED. Discharged with Rx for IBU, Fioricet, and Zanaflex. Patient admits that she didn't fill any prescriptions because she's feeling "a lot better." States Toradol was helpful in ED. Back pain is resolved and Headache much improved.      Requesting letter to join aerobics class.     No other concerns.     (2/8/2023): 63 y.o. AAF with a PmHx of HTN, high cholesterol, Hypothyroidism, right-sided thyroid nodules, MSH (following Urology), back pain, and Sinus Bradycardia, presenting for routine f/u. She completed labs which are stable overall. Her total cholesterol is slightly elevated at 211. Total WBC count remains subnormal but stable compared to baseline. She also continues with occult blood in urine. S/p cysto 1/2020 which was negative. Denies gross hematuria. She did visit the ED twice in December d/t flu A, then a subsequent syncopal episode r/t flu. She's completely recovered at this time. She is due for her mammogram next month, colon cancer screening, and DXA. Amenable to all. Declines vaccines.     Today's Visit (8/30/2023): 63 y.o. AAF with a PmHx of HTN, high cholesterol, Hypothyroidism, right-sided thyroid nodules, MSH (following Urology), back pain, and Sinus Bradycardia, presenting for " "routine f/u. She completed labs this am. Labs continue to demonstrate total WBC count remains subnormal but stable compared to baseline. A peripheral smear is pending. She also continues with occult blood in urine. S/p cysto 1/2020 which was negative and again 6/7/23 which revealed:  Cystoscopy:        - Urethral meatus:  No strictures         - Urethra:  Normal without strictures or lesions         - Bladder neck:  Normal         - Bladder:  No mucosal abnormalities         - Ureteral orifices:  On the trigone with clear efflux bilaterally. Denies gross hematuria. TFTs and CMP unremarkable. She did visit the ED in April with COVID.  She had a negative MMG in March and a DXA revealing osteopenia. Cologuard ordered in February was never completed. Admits that the kit is in her closet.    Her VS are stable. She has stable, asymptomatic bradycardia. Follows Cards. Has seen Dr. Tutu Miller's () team for leg complaints. Last visit this month. States told everything "fine."     No acute concerns.    10/4/23: Patient presenting for ER f/u. She is presented to the ER 9/5/23, 9/29 with right knee pain and 10/1/23 with elevated BP. She relates knee pain ongoing. Associated symptoms include swelling. XR of right knee from 9/5/23 revealed degenerative changes. She was also prescribed Meloxicam to help with the pain. Doesn't appear to have started this yet.    Of note, she contacted the clinic 9/20/23 reporting that she'd experienced two episodes of her lips swelling. States recalled a past conversation regarding side effects of Lisinopril and she was concerned that Lisinopril was causing her symptoms. She was advised to stop Lisinopril and start Losartan. She went to the ER 10/1 with HTN. SBP was as high as 200s. Today, SBP 160s. Home log reveals SBP 160s-190s since mid-September. Currently without chest pain, SOB, headaches, dizziness, weakness, leg pain, etc. The ED increased her Losartan to 50 mg po daily. She is also on " Amlodipine 5 mg po daily.         Review of Systems     Review of Systems   Constitutional: Negative.  Negative for fatigue, fever and unexpected weight change.   HENT: Negative.     Eyes: Negative.    Respiratory: Negative.  Negative for apnea, cough, choking, chest tightness, shortness of breath, wheezing and stridor.    Cardiovascular: Negative.  Negative for chest pain, palpitations and leg swelling.   Gastrointestinal: Negative.    Endocrine: Negative.    Genitourinary: Negative.    Musculoskeletal: Negative.  Negative for arthralgias, back pain, gait problem, joint swelling, myalgias, neck pain and neck stiffness.   Skin: Negative.  Negative for color change, pallor, rash and wound.   Allergic/Immunologic: Negative.    Neurological: Negative.  Negative for dizziness, tremors, seizures, syncope, facial asymmetry, speech difficulty, weakness, light-headedness, numbness and headaches.   Hematological: Negative.  Negative for adenopathy. Does not bruise/bleed easily.   Psychiatric/Behavioral: Negative.  Negative for self-injury, sleep disturbance and suicidal ideas. The patient is not nervous/anxious.        Medical / Social / Family History     Past Medical History:   Diagnosis Date    Bradycardia     Chronic back pain     Essential (primary) hypertension     Hypothyroidism, unspecified     Menopause     Microscopic hematuria     Nontoxic single thyroid nodule     Sleep disorder not due to a substance or known physiological condition, unspecified        Past Surgical History:   Procedure Laterality Date    NO PAST SURGERIES         Social History  Ms.  reports that she has never smoked. She has been exposed to tobacco smoke. She has never used smokeless tobacco. She reports that she does not drink alcohol and does not use drugs.    Family History  Ms.'s family history includes Diabetes type II in her mother; Hypertension in her father and mother.    Medications and Allergies     Medications  Current Outpatient  "Medications   Medication Instructions    amLODIPine (NORVASC) 10 mg, Oral, Daily    blood pressure monitor (BLOOD PRESSURE KIT) Kit Use daily to assess blood pressure    fluticasone propionate (FLONASE) 50 mcg, Each Nostril, 2 times daily PRN    ibuprofen (ADVIL,MOTRIN) 800 mg, Oral, Every 6 hours PRN    levothyroxine (SYNTHROID) 75 mcg, Oral, Before breakfast    losartan (COZAAR) 50 mg, Oral, Daily    meloxicam (MOBIC) 15 mg, Oral, Daily PRN       Allergies  Review of patient's allergies indicates:   Allergen Reactions    Hydrochlorothiazide      Other reaction(s): rash  Rash    Hydrocortisone Other (See Comments)    Lisinopril Edema    Sulfamethoxazole-trimethoprim Other (See Comments)    Hydrobenzthiazide Rash    Sulfa (sulfonamide antibiotics) Rash    Sulfur Rash       Physical Examination   Visit Vitals  BP (!) 162/82 (BP Location: Left arm, Patient Position: Sitting, BP Method: Medium (Manual))   Pulse 64   Temp 97.7 °F (36.5 °C) (Oral)   Resp 20   Ht 5' 7" (1.702 m)   Wt 67.3 kg (148 lb 6.4 oz)   BMI 23.24 kg/m²     Physical Exam  Constitutional:       Appearance: Normal appearance.   HENT:      Head: Normocephalic and atraumatic.      Right Ear: External ear normal.      Left Ear: External ear normal.   Eyes:      Extraocular Movements: Extraocular movements intact.      Conjunctiva/sclera: Conjunctivae normal.   Cardiovascular:      Rate and Rhythm: Regular rhythm. Bradycardia present.      Pulses: Normal pulses.      Heart sounds: Normal heart sounds.   Pulmonary:      Effort: Pulmonary effort is normal.      Breath sounds: Normal breath sounds.   Abdominal:      General: Bowel sounds are normal.      Palpations: Abdomen is soft.   Musculoskeletal:         General: Normal range of motion.      Right lower leg: No edema.      Left lower leg: No edema.   Skin:     General: Skin is warm and dry.   Neurological:      General: No focal deficit present.      Mental Status: She is alert and oriented to person, " place, and time.   Psychiatric:         Mood and Affect: Mood normal.         Behavior: Behavior normal.         Thought Content: Thought content normal.         Judgment: Judgment normal.           Results     Chemistry:  Lab Results   Component Value Date     10/01/2023    K 3.2 (L) 10/01/2023    CHLORIDE 107 10/01/2023    BUN 11.3 10/01/2023    CREATININE 0.69 10/01/2023    EGFRNORACEVR >60 10/01/2023    GLUCOSE 78 (L) 10/01/2023    CALCIUM 9.2 10/01/2023    ALKPHOS 71 10/01/2023    LABPROT 7.4 10/01/2023    ALBUMIN 4.0 10/01/2023    BILIDIR 0.2 12/03/2021    IBILI 0.20 12/03/2021    AST 25 10/01/2023    ALT 18 10/01/2023    MG 2.20 12/11/2022    DQJAEAWS17YS 57.3 08/30/2023        Lab Results   Component Value Date    HGBA1C 5.6 06/25/2021        Hematology:  Lab Results   Component Value Date    WBC 2.78 (L) 10/01/2023    HGB 11.4 (L) 10/01/2023    HCT 35.9 (L) 10/01/2023     10/01/2023       Lipid Panel:  Lab Results   Component Value Date    CHOL 211 (H) 02/06/2023    HDL 73 (H) 02/06/2023    .00 02/06/2023    TRIG 43 02/06/2023    TOTALCHOLEST 3 02/06/2023        Urine:  Lab Results   Component Value Date    COLORUA Light-Yellow 02/06/2023    APPEARANCEUA Clear 02/06/2023    SGUA 1.015 02/06/2023    PHUA 6.5 02/06/2023    PROTEINUA Negative 02/06/2023    GLUCOSEUA Normal 02/06/2023    KETONESUA Negative 02/06/2023    BLOODUA 1+ (A) 02/06/2023    NITRITESUA Negative 02/06/2023    LEUKOCYTESUR Negative 02/06/2023    RBCUA 0-5 02/06/2023    WBCUA 0-5 02/06/2023    BACTERIA Trace (A) 02/06/2023    SQEPUA Trace (A) 02/06/2023    HYALINECASTS None Seen 02/06/2023          Assessment        ICD-10-CM ICD-9-CM   1. Chronic pain of right knee  M25.561 719.46    G89.29 338.29   2. Hypertension, unspecified type  I10 401.9        Plan (including Health Maintenance)     Problem List Items Addressed This Visit          Cardiac/Vascular    Hypertension    Current Assessment & Plan     She does not want  to go up on Losartan. Continue current dose  Will increase Amlodipine to 10 mg po daily  Educated on aerobic exercise (3-5 days/week) and a low-fat, low-sodium diet  Avoid excess ETOH consumption. Smoking cessation if applicable  ED precautions (s/s of CVA, etc)  RTC in 2 -4 weeks to re-check BP           Relevant Medications    amLODIPine (NORVASC) 10 MG tablet       Orthopedic    Chronic pain of right knee - Primary    Current Assessment & Plan     Heat/Ice therapy PRN  NSAIDs PRN (if not contraindicated)  Acetaminophen Arthritis Strength (if not contraindicated)  Aerobic Exercise  Weight Loss  Refer to Ortho  XR done 9/5/23         Relevant Orders    Ambulatory referral/consult to Orthopedics         Health Maintenance Due   Topic Date Due    Colorectal Cancer Screening  Never done    COVID-19 Vaccine (6 - Mixed Product series) 01/12/2022    Influenza Vaccine (1) Never done       Future Appointments   Date Time Provider Department Center   11/7/2023  1:30 PM [facility]    12/6/2023  9:00 AM [facility]    3/5/2024  7:30 AM [facility]    For any new or worsening symptoms that are urgent, or for any s/s of MI, CVA, or any other emergent concerns, please visit the ER for further eval. Otherwise, call clinic with questions or concerns.      Follow up in about 4 weeks (around 11/1/2023) for BP Check.    Signature:  BERTHA Quispe  [facility]  [address] [facility] [address]    Date of encounter: 10/4/23

## 2023-11-01 ENCOUNTER — OFFICE VISIT (OUTPATIENT)
Dept: ORTHOPEDICS | Facility: CLINIC | Age: 63
End: 2023-11-01
Payer: MEDICARE

## 2023-11-01 VITALS
BODY MASS INDEX: 23.23 KG/M2 | DIASTOLIC BLOOD PRESSURE: 59 MMHG | HEIGHT: 67 IN | SYSTOLIC BLOOD PRESSURE: 108 MMHG | HEART RATE: 48 BPM | WEIGHT: 148 LBS

## 2023-11-01 DIAGNOSIS — G89.29 CHRONIC PAIN OF RIGHT KNEE: ICD-10-CM

## 2023-11-01 DIAGNOSIS — M25.561 CHRONIC PAIN OF RIGHT KNEE: ICD-10-CM

## 2023-11-01 PROCEDURE — 4010F PR ACE/ARB THEARPY RXD/TAKEN: ICD-10-PCS | Mod: CPTII,,, | Performed by: REHABILITATION UNIT

## 2023-11-01 PROCEDURE — 3008F BODY MASS INDEX DOCD: CPT | Mod: CPTII,,, | Performed by: REHABILITATION UNIT

## 2023-11-01 PROCEDURE — 3078F PR MOST RECENT DIASTOLIC BLOOD PRESSURE < 80 MM HG: ICD-10-PCS | Mod: CPTII,,, | Performed by: REHABILITATION UNIT

## 2023-11-01 PROCEDURE — 99204 OFFICE O/P NEW MOD 45 MIN: CPT | Mod: 25,,, | Performed by: REHABILITATION UNIT

## 2023-11-01 PROCEDURE — 20610 DRAIN/INJ JOINT/BURSA W/O US: CPT | Mod: RT,,, | Performed by: REHABILITATION UNIT

## 2023-11-01 PROCEDURE — 99204 PR OFFICE/OUTPT VISIT, NEW, LEVL IV, 45-59 MIN: ICD-10-PCS | Mod: 25,,, | Performed by: REHABILITATION UNIT

## 2023-11-01 PROCEDURE — 20610 LARGE JOINT ASPIRATION/INJECTION: R KNEE: ICD-10-PCS | Mod: RT,,, | Performed by: REHABILITATION UNIT

## 2023-11-01 PROCEDURE — 3008F PR BODY MASS INDEX (BMI) DOCUMENTED: ICD-10-PCS | Mod: CPTII,,, | Performed by: REHABILITATION UNIT

## 2023-11-01 PROCEDURE — 3078F DIAST BP <80 MM HG: CPT | Mod: CPTII,,, | Performed by: REHABILITATION UNIT

## 2023-11-01 PROCEDURE — 3074F SYST BP LT 130 MM HG: CPT | Mod: CPTII,,, | Performed by: REHABILITATION UNIT

## 2023-11-01 PROCEDURE — 1159F MED LIST DOCD IN RCRD: CPT | Mod: CPTII,,, | Performed by: REHABILITATION UNIT

## 2023-11-01 PROCEDURE — 4010F ACE/ARB THERAPY RXD/TAKEN: CPT | Mod: CPTII,,, | Performed by: REHABILITATION UNIT

## 2023-11-01 PROCEDURE — 1159F PR MEDICATION LIST DOCUMENTED IN MEDICAL RECORD: ICD-10-PCS | Mod: CPTII,,, | Performed by: REHABILITATION UNIT

## 2023-11-01 PROCEDURE — 3074F PR MOST RECENT SYSTOLIC BLOOD PRESSURE < 130 MM HG: ICD-10-PCS | Mod: CPTII,,, | Performed by: REHABILITATION UNIT

## 2023-11-01 RX ORDER — LIDOCAINE HYDROCHLORIDE 20 MG/ML
40 INJECTION, SOLUTION INFILTRATION; PERINEURAL
Status: DISCONTINUED | OUTPATIENT
Start: 2023-11-01 | End: 2023-11-01 | Stop reason: HOSPADM

## 2023-11-01 RX ORDER — BETAMETHASONE SODIUM PHOSPHATE AND BETAMETHASONE ACETATE 3; 3 MG/ML; MG/ML
12 INJECTION, SUSPENSION INTRA-ARTICULAR; INTRALESIONAL; INTRAMUSCULAR; SOFT TISSUE
Status: DISCONTINUED | OUTPATIENT
Start: 2023-11-01 | End: 2023-11-01 | Stop reason: HOSPADM

## 2023-11-01 RX ADMIN — LIDOCAINE HYDROCHLORIDE 40 MG: 20 INJECTION, SOLUTION INFILTRATION; PERINEURAL at 09:11

## 2023-11-01 RX ADMIN — BETAMETHASONE SODIUM PHOSPHATE AND BETAMETHASONE ACETATE 12 MG: 3; 3 INJECTION, SUSPENSION INTRA-ARTICULAR; INTRALESIONAL; INTRAMUSCULAR; SOFT TISSUE at 09:11

## 2023-11-01 NOTE — PROGRESS NOTES
Subjective:      Patient ID: Julianne Parker is a 63 y.o. female.    Chief Complaint: Pain of the Right Knee (Right knee pain. Been hurting for about 3 months. Pain radiates down leg. Gives out when she walks at times. Has difficulty bending it to a certain degree. Has swelling at times. Had cortisone injection last month that lasted 1 day)    HPI:   Julianne Parker is a 63 y.o. female who presents today for initial evaluation of her right knee. She is referred by Mikey Biggs FNP. Pain started in August. No trauma or event. Pain anterior. IM steroid shot with one day of relief. Taking tylneol and mobic with relief. No PT. she went to the emergency department and has also seen her primary care provider.  No instability or mechanical symptoms.  No sensory or motor changes distally.  Pain worse with activity and somewhat better with rest.  She has associated swelling that fluctuates.    Past Medical History:   Diagnosis Date    Bradycardia     Chronic back pain     Essential (primary) hypertension     Hypothyroidism, unspecified     Menopause     Microscopic hematuria     Nontoxic single thyroid nodule     Sleep disorder not due to a substance or known physiological condition, unspecified      Past Surgical History:   Procedure Laterality Date    NO PAST SURGERIES       Social History     Socioeconomic History    Marital status:      Spouse name: Manas    Number of children: 2   Tobacco Use    Smoking status: Never     Passive exposure: Past    Smokeless tobacco: Never   Substance and Sexual Activity    Alcohol use: Never    Drug use: Never    Sexual activity: Yes     Partners: Male     Social Determinants of Health     Financial Resource Strain: Low Risk  (2/8/2023)    Overall Financial Resource Strain (CARDIA)     Difficulty of Paying Living Expenses: Not hard at all   Food Insecurity: No Food Insecurity (2/8/2023)    Hunger Vital Sign     Worried About Running Out of Food in the Last Year: Never  true     Ran Out of Food in the Last Year: Never true   Transportation Needs: No Transportation Needs (2/8/2023)    PRAPARE - Transportation     Lack of Transportation (Medical): No     Lack of Transportation (Non-Medical): No   Physical Activity: Insufficiently Active (2/8/2023)    Exercise Vital Sign     Days of Exercise per Week: 2 days     Minutes of Exercise per Session: 40 min   Stress: No Stress Concern Present (2/8/2023)    [employer] [place] [employer] - Occupational Stress Questionnaire     Feeling of Stress : Not at all   Social Connections: Socially Integrated (2/8/2023)    Social Connection and Isolation Panel [NHANES]     Frequency of Communication with Friends and Family: Twice a week     Frequency of Social Gatherings with Friends and Family: Once a week     Attends Syn[employer] Services: More than 4 times per year     Active Member of Clubs or Organizations: Yes     Attends Club or Organization Meetings: More than 4 times per year     Marital Status:    Housing Stability: Low Risk  (2/8/2023)    Housing Stability Vital Sign     Unable to Pay for Housing in the Last Year: No     Number of Places Lived in the Last Year: 1     Unstable Housing in the Last Year: No         Current Outpatient Medications:     amLODIPine (NORVASC) 10 MG tablet, Take 1 tablet (10 mg total) by mouth once daily., Disp: 90 tablet, Rfl: 3    blood pressure monitor (BLOOD PRESSURE KIT) Kit, Use daily to assess blood pressure, Disp: 1 each, Rfl: 0    fluticasone propionate (FLONASE) 50 mcg/actuation nasal spray, 1 spray (50 mcg total) by Each Nostril route 2 (two) times daily as needed for Rhinitis., Disp: 15 g, Rfl: 0    levothyroxine (SYNTHROID) 75 MCG tablet, Take 1 tablet (75 mcg total) by mouth before breakfast., Disp: 90 tablet, Rfl: 1    losartan (COZAAR) 50 MG tablet, Take 50 mg by mouth once daily., Disp: , Rfl:     meloxicam (MOBIC) 15 MG tablet, Take 15 mg by mouth daily as needed., Disp: , Rfl:  "  Review of patient's allergies indicates:   Allergen Reactions    Hydrochlorothiazide      Other reaction(s): rash  Rash    Hydrocortisone Other (See Comments)    Lisinopril Edema    Sulfamethoxazole-trimethoprim Other (See Comments)    Hydrobenzthiazide Rash    Sulfa (sulfonamide antibiotics) Rash    Sulfur Rash       BP (!) 108/59   Pulse (!) 48   Ht 5' 7" (1.702 m)   Wt 67.1 kg (148 lb)   BMI 23.18 kg/m²     Comprehensive review of systems completed and negative except as per HPI.        Objective:   Head: Normocephalic, without obvious abnormality, atraumatic  Eyes: conjunctivae/corneas clear. EOM's intact  Ears: normal external appearance  Nose: Nares normal. Septum midline. Mucosa normal. No drainage  Throat: normal findings: lips normal without lesions  Lungs: unlabored breathing on room air  Chest wall: symmetric chest rise  Heart: regular rate and rhythm  Pulses: 2+ and symmetric  Skin: Skin color, texture, turgor normal. No rashes or lesions  Neurologic: Grossly normal    right KNEE:     Alignment: neutral  Appearance: skin in intact without lesion  Effusion: small  Gait: antalgic  Straight Leg Raise: negative  Log Roll: negative  Hip ROM: full and painless  Ankle ROM: full and painless   Knee ROM:  0 - 130  Tenderness: TTP mild anterior; no joint line ttp   Patellar Mobility: normal  Patellar grind: negative  J Sign: negative  PF Crepitus: negative        Surinder Test: negative   Valgus Stress @ 0 deg: stable  Valgus Stress @ 30 deg: stable  Varus Stress @ 0 deg: stable  Varus Stress @ 30 deg: stable  Lachman: stable  Ant Drawer: negative   Post Drawer: negative  Posterior Sag Sign: negative  Neurological deficits: SILT dp/sp/t distributions  Motor: 5/5 EHL/FHL/TA/GS    Warm well perfused lower extremity with capillary refill less than 2 seconds and sensation intact to light touch in the terminal nerve distributions. Calf soft and easily compressible without clinical sign of DVT. No palpable " popliteal lymphadenopathy    Assessment:     Imaging:   X-Ray Knee 3 View Right  Narrative: EXAMINATION:  XR KNEE 3 VIEW RIGHT    CLINICAL HISTORY:  Pain in unspecified knee    TECHNIQUE:  AP, lateral, and Merchant views of the right knee were performed.    COMPARISON:  03/14/2023    FINDINGS:  Degenerative changes with tricompartmental osteophytes.  There is mild loss of joint space of the medial greater than lateral compartment.  Mild lateral translocation of the tibia in relation to the femur.  Impression: Degenerative changes as above.    Electronically signed by: Red Healy  Date:    09/05/2023  Time:    09:38    Previously obtained radiographs reviewed showing mild degenerative changes.    Large Joint Aspiration/Injection: R knee    Date/Time: 11/1/2023 9:00 AM    Performed by: Leo Saucedo MD  Authorized by: Leo Saucedo MD    Consent Done?:  Yes (Verbal)  Indications:  Arthritis and pain  Site marked: the procedure site was marked    Timeout: prior to procedure the correct patient, procedure, and site was verified    Prep: patient was prepped and draped in usual sterile fashion    Local anesthesia used?: No      Details:  Needle Size:  22 G  Ultrasonic Guidance for needle placement?: No    Approach:  Lateral  Location:  Knee  Site:  R knee  Medications:  12 mg betamethasone acetate-betamethasone sodium phosphate 6 mg/mL; 40 mg LIDOcaine HCL 20 mg/ml (2%) 20 mg/mL (2 %)  Patient tolerance:  Patient tolerated the procedure well with no immediate complications            1. Chronic pain of right knee          Plan:       Orders Placed This Encounter    Large Joint Aspiration/Injection: R knee    Ambulatory referral/consult to Physical/Occupational Therapy     Imaging and exam findings discussed.  She has mild degenerative changes on her radiographs.  No traumatic or inciting event.  She is had some relief with Mobic and very temporary relief with the intramuscular steroid injection.  We discussed  her options.  She elected to proceed with intra-articular steroid injection which was provided as above and she tolerated well.  Post-injection care was discussed.  She will also start physical therapy at [facility]. Continue mobic.  I am going to see her back on an as-needed basis.  Proceed with MRI she has any persistent pain or issues or develops mechanical symptoms.  All of her questions were answered and she was in agreement with the plan.

## 2023-11-07 ENCOUNTER — TELEPHONE (OUTPATIENT)
Dept: INTERNAL MEDICINE | Facility: CLINIC | Age: 63
End: 2023-11-07
Payer: MEDICARE

## 2023-11-07 NOTE — TELEPHONE ENCOUNTER
Spoke to pt and she left a message with  to see if she need to R/S her apt from today she did not feel a need to come in because her BP have been fine. I explain to the pt there was no opening  and she have a six months f/u in March. She would like to keep that apt,because she is doing good at this time and all her meds are filled and I offer to put her on the waiting list so if someone cancel the clinic will reach out to her. Pt confirmed she understood.

## 2023-11-15 ENCOUNTER — TELEPHONE (OUTPATIENT)
Dept: ORTHOPEDICS | Facility: CLINIC | Age: 63
End: 2023-11-15
Payer: MEDICARE

## 2023-11-15 NOTE — TELEPHONE ENCOUNTER
Patient called and lvm asking for a call back regarding physical therapy.      Called patient back where she advised therapy has not called her yet. Wants to know how long therapy will be. Advised it will just depend as the first visit will be the initial eval where they will suggest how long you should attend therapy. Advised patient would get with therapy and have them call her soon to get scheduled. Patient asked if can do it to where it starts after thanksgiving. Advised patient I would mention this to therapy and to give me a call back if she has any questions. Pt verbalized understanding and will call with any questions or concerns.

## 2023-12-04 PROBLEM — Z00.00 WELLNESS EXAMINATION: Status: RESOLVED | Noted: 2022-08-16 | Resolved: 2023-12-04

## 2023-12-06 ENCOUNTER — OFFICE VISIT (OUTPATIENT)
Dept: ORTHOPEDICS | Facility: CLINIC | Age: 63
End: 2023-12-06
Payer: MEDICARE

## 2023-12-06 VITALS
HEIGHT: 67 IN | DIASTOLIC BLOOD PRESSURE: 65 MMHG | WEIGHT: 148 LBS | HEART RATE: 51 BPM | SYSTOLIC BLOOD PRESSURE: 117 MMHG | BODY MASS INDEX: 23.23 KG/M2

## 2023-12-06 DIAGNOSIS — M25.561 CHRONIC PAIN OF RIGHT KNEE: Primary | ICD-10-CM

## 2023-12-06 DIAGNOSIS — G89.29 CHRONIC PAIN OF RIGHT KNEE: Primary | ICD-10-CM

## 2023-12-06 PROCEDURE — 1160F RVW MEDS BY RX/DR IN RCRD: CPT | Mod: CPTII,,, | Performed by: REHABILITATION UNIT

## 2023-12-06 PROCEDURE — 3074F PR MOST RECENT SYSTOLIC BLOOD PRESSURE < 130 MM HG: ICD-10-PCS | Mod: CPTII,,, | Performed by: REHABILITATION UNIT

## 2023-12-06 PROCEDURE — 3074F SYST BP LT 130 MM HG: CPT | Mod: CPTII,,, | Performed by: REHABILITATION UNIT

## 2023-12-06 PROCEDURE — 3008F PR BODY MASS INDEX (BMI) DOCUMENTED: ICD-10-PCS | Mod: CPTII,,, | Performed by: REHABILITATION UNIT

## 2023-12-06 PROCEDURE — 3078F DIAST BP <80 MM HG: CPT | Mod: CPTII,,, | Performed by: REHABILITATION UNIT

## 2023-12-06 PROCEDURE — 3008F BODY MASS INDEX DOCD: CPT | Mod: CPTII,,, | Performed by: REHABILITATION UNIT

## 2023-12-06 PROCEDURE — 1159F PR MEDICATION LIST DOCUMENTED IN MEDICAL RECORD: ICD-10-PCS | Mod: CPTII,,, | Performed by: REHABILITATION UNIT

## 2023-12-06 PROCEDURE — 1160F PR REVIEW ALL MEDS BY PRESCRIBER/CLIN PHARMACIST DOCUMENTED: ICD-10-PCS | Mod: CPTII,,, | Performed by: REHABILITATION UNIT

## 2023-12-06 PROCEDURE — 99213 PR OFFICE/OUTPT VISIT, EST, LEVL III, 20-29 MIN: ICD-10-PCS | Mod: ,,, | Performed by: REHABILITATION UNIT

## 2023-12-06 PROCEDURE — 3078F PR MOST RECENT DIASTOLIC BLOOD PRESSURE < 80 MM HG: ICD-10-PCS | Mod: CPTII,,, | Performed by: REHABILITATION UNIT

## 2023-12-06 PROCEDURE — 4010F ACE/ARB THERAPY RXD/TAKEN: CPT | Mod: CPTII,,, | Performed by: REHABILITATION UNIT

## 2023-12-06 PROCEDURE — 99213 OFFICE O/P EST LOW 20 MIN: CPT | Mod: ,,, | Performed by: REHABILITATION UNIT

## 2023-12-06 PROCEDURE — 4010F PR ACE/ARB THEARPY RXD/TAKEN: ICD-10-PCS | Mod: CPTII,,, | Performed by: REHABILITATION UNIT

## 2023-12-06 PROCEDURE — 1159F MED LIST DOCD IN RCRD: CPT | Mod: CPTII,,, | Performed by: REHABILITATION UNIT

## 2023-12-06 RX ORDER — DICLOFENAC SODIUM 10 MG/G
2 GEL TOPICAL 4 TIMES DAILY
Qty: 100 G | Refills: 0 | Status: SHIPPED | OUTPATIENT
Start: 2023-12-06 | End: 2024-01-08 | Stop reason: SDUPTHER

## 2023-12-06 NOTE — PROGRESS NOTES
Subjective:      Patient ID: Julianne Parker is a 63 y.o. female.    Chief Complaint: Follow-up of the Right Knee (F/U for right knee pain. Knee is still hurting. Injection helped some but knee is still hurting all the time.)    HPI:   Julianne Parker is a 63 y.o. female who presents today for follow up evaluation of her right knee.  She was last seen around a month ago.  She was provided with a steroid injection.  She reports that this was helpful but has started to wane.  Pain is localized to the calf and no joint line pain.  No mechanical symptoms or instability.  No sensory or motor changes.  She reports that therapy never called her so she has not done any rehab.  Mobic has been helpful.    Initial HPI:  She is referred by Mikey Biggs FNP. Pain started in August. No trauma or event. Pain anterior. IM steroid shot with one day of relief. Taking tylneol and mobic with relief. No PT. she went to the emergency department and has also seen her primary care provider.  No instability or mechanical symptoms.  No sensory or motor changes distally.  Pain worse with activity and somewhat better with rest.  She has associated swelling that fluctuates.     Past Medical History:   Diagnosis Date    Bradycardia     Chronic back pain     Essential (primary) hypertension     Hypothyroidism, unspecified     Menopause     Microscopic hematuria     Nontoxic single thyroid nodule     Sleep disorder not due to a substance or known physiological condition, unspecified      Past Surgical History:   Procedure Laterality Date    NO PAST SURGERIES       Social History     Socioeconomic History    Marital status:      Spouse name: Manas    Number of children: 2   Tobacco Use    Smoking status: Never     Passive exposure: Past    Smokeless tobacco: Never   Substance and Sexual Activity    Alcohol use: Never    Drug use: Never    Sexual activity: Yes     Partners: Male     Social Determinants of Health     Financial  Resource Strain: Low Risk  (2/8/2023)    Overall Financial Resource Strain (CARDIA)     Difficulty of Paying Living Expenses: Not hard at all   Food Insecurity: No Food Insecurity (2/8/2023)    Hunger Vital Sign     Worried About Running Out of Food in the Last Year: Never true     Ran Out of Food in the Last Year: Never true   Transportation Needs: No Transportation Needs (2/8/2023)    PRAPARE - Transportation     Lack of Transportation (Medical): No     Lack of Transportation (Non-Medical): No   Physical Activity: Insufficiently Active (2/8/2023)    Exercise Vital Sign     Days of Exercise per Week: 2 days     Minutes of Exercise per Session: 40 min   Stress: No Stress Concern Present (2/8/2023)    [employer] [place] [employer] - Occupational Stress Questionnaire     Feeling of Stress : Not at all   Social Connections: Socially Integrated (2/8/2023)    Social Connection and Isolation Panel [NHANES]     Frequency of Communication with Friends and Family: Twice a week     Frequency of Social Gatherings with Friends and Family: Once a week     Attends [employer] Services: More than 4 times per year     Active Member of Clubs or Organizations: Yes     Attends Club or Organization Meetings: More than 4 times per year     Marital Status:    Housing Stability: Low Risk  (2/8/2023)    Housing Stability Vital Sign     Unable to Pay for Housing in the Last Year: No     Number of Places Lived in the Last Year: 1     Unstable Housing in the Last Year: No         Current Outpatient Medications:     amLODIPine (NORVASC) 10 MG tablet, Take 1 tablet (10 mg total) by mouth once daily., Disp: 90 tablet, Rfl: 3    blood pressure monitor (BLOOD PRESSURE KIT) Kit, Use daily to assess blood pressure, Disp: 1 each, Rfl: 0    fluticasone propionate (FLONASE) 50 mcg/actuation nasal spray, 1 spray (50 mcg total) by Each Nostril route 2 (two) times daily as needed for Rhinitis., Disp: 15 g, Rfl: 0    levothyroxine  "(SYNTHROID) 75 MCG tablet, Take 1 tablet (75 mcg total) by mouth before breakfast., Disp: 90 tablet, Rfl: 1    losartan (COZAAR) 50 MG tablet, Take 50 mg by mouth once daily., Disp: , Rfl:     meloxicam (MOBIC) 15 MG tablet, Take 15 mg by mouth daily as needed., Disp: , Rfl:   Review of patient's allergies indicates:   Allergen Reactions    Hydrochlorothiazide      Other reaction(s): rash  Rash    Hydrocortisone Other (See Comments)    Lisinopril Edema    Sulfamethoxazole-trimethoprim Other (See Comments)    Hydrobenzthiazide Rash    Sulfa (sulfonamide antibiotics) Rash    Sulfur Rash       /65   Pulse (!) 51   Ht 5' 7" (1.702 m)   Wt 67.1 kg (148 lb)   BMI 23.18 kg/m²     Comprehensive review of systems completed and negative except as per HPI.        Objective:   Head: Normocephalic, without obvious abnormality, atraumatic  Eyes: conjunctivae/corneas clear. EOM's intact  Ears: normal external appearance  Nose: Nares normal. Septum midline. Mucosa normal. No drainage  Throat: normal findings: lips normal without lesions  Lungs: unlabored breathing on room air  Chest wall: symmetric chest rise  Heart: regular rate and rhythm  Pulses: 2+ and symmetric  Skin: Skin color, texture, turgor normal. No rashes or lesions  Neurologic: Grossly normal    right KNEE:     Alignment: neutral  Appearance: skin in intact without lesion  Effusion: small  Gait: antalgic  Straight Leg Raise: negative  Log Roll: negative  Hip ROM: full and painless  Ankle ROM: full and painless   Knee ROM:  0 - 130  Tenderness: TTP mild anterior and calf musculature; no joint line ttp   Patellar Mobility: normal  Patellar grind: negative  J Sign: negative  PF Crepitus: negative        Surinder Test: negative   Valgus Stress @ 0 deg: stable  Valgus Stress @ 30 deg: stable  Varus Stress @ 0 deg: stable  Varus Stress @ 30 deg: stable  Lachman: stable  Ant Drawer: negative   Post Drawer: negative  Posterior Sag Sign: negative  Neurological " deficits: SILT dp/sp/t distributions  Motor: 5/5 EHL/FHL/TA/GS    Warm well perfused lower extremity with capillary refill less than 2 seconds and sensation intact to light touch in the terminal nerve distributions. Calf soft and easily compressible without clinical sign of DVT. No palpable popliteal lymphadenopathy    Assessment:     Imaging:   X-Ray Knee 3 View Right  Narrative: EXAMINATION:  XR KNEE 3 VIEW RIGHT    CLINICAL HISTORY:  Pain in unspecified knee    TECHNIQUE:  AP, lateral, and Merchant views of the right knee were performed.    COMPARISON:  03/14/2023    FINDINGS:  Degenerative changes with tricompartmental osteophytes.  There is mild loss of joint space of the medial greater than lateral compartment.  Mild lateral translocation of the tibia in relation to the femur.  Impression: Degenerative changes as above.    Electronically signed by: Red Healy  Date:    09/05/2023  Time:    09:38    Previously obtained radiographs reviewed showing mild degenerative changes.      1. Chronic pain of right knee            Plan:       Orders Placed This Encounter    Ambulatory referral/consult to Physical/Occupational Therapy       Imaging and exam findings discussed.  She has mild degenerative changes on her radiographs.  No traumatic or inciting event.  Mobic has been helpful and an injection a month ago was also helpful.  She does have some persistent pain but a lot of this is within the musculature her calf today and she has no joint line tenderness and no mechanical symptoms.  She did not get into physical therapy so we will recent the order.  She will continue her Mobic.  Going to fit her for a hinged knee brace.  We will also send her in some Voltaren gel.  She is going to follow up in around 4-6 weeks.  If at that time she has persistent pain we will consider MRI if she has anymore joint line pain or develops any mechanical symptoms. All of her questions were answered and she was in agreement with the  plan.

## 2023-12-07 ENCOUNTER — TELEPHONE (OUTPATIENT)
Dept: ORTHOPEDICS | Facility: CLINIC | Age: 63
End: 2023-12-07
Payer: MEDICARE

## 2023-12-07 NOTE — TELEPHONE ENCOUNTER
Patient called and lvm stating she has questions but did not specify what it was regarding.       Called patient and received no answer and was unable to leave message. Awaiting call back from patient at this time.

## 2023-12-07 NOTE — TELEPHONE ENCOUNTER
Patient returned phone call.       Called patient back and advised wanting to know if physical therapy place she was sent to is something we use regularly. Advised patient we do use them regularly. Pt verbalized understanding and will call with any questions or concerns.

## 2024-01-18 ENCOUNTER — TELEPHONE (OUTPATIENT)
Dept: ORTHOPEDICS | Facility: CLINIC | Age: 64
End: 2024-01-18
Payer: MEDICARE

## 2024-01-31 ENCOUNTER — HOSPITAL ENCOUNTER (OUTPATIENT)
Dept: RADIOLOGY | Facility: HOSPITAL | Age: 64
Discharge: HOME | End: 2024-01-31
Attending: REHABILITATION UNIT
Payer: MEDICARE

## 2024-01-31 DIAGNOSIS — M25.561 CHRONIC PAIN OF RIGHT KNEE: ICD-10-CM

## 2024-01-31 DIAGNOSIS — G89.29 CHRONIC PAIN OF RIGHT KNEE: ICD-10-CM

## 2024-01-31 PROCEDURE — 73721 MRI JNT OF LWR EXTRE W/O DYE: CPT | Mod: TC,RT

## 2024-02-07 ENCOUNTER — OFFICE VISIT (OUTPATIENT)
Dept: ORTHOPEDICS | Facility: CLINIC | Age: 64
End: 2024-02-07
Payer: MEDICARE

## 2024-02-07 VITALS
HEART RATE: 52 BPM | BODY MASS INDEX: 23.23 KG/M2 | DIASTOLIC BLOOD PRESSURE: 71 MMHG | HEIGHT: 67 IN | SYSTOLIC BLOOD PRESSURE: 136 MMHG | WEIGHT: 148 LBS

## 2024-02-07 DIAGNOSIS — Z01.818 PREOP TESTING: ICD-10-CM

## 2024-02-07 DIAGNOSIS — S83.281A OTHER TEAR OF LATERAL MENISCUS OF RIGHT KNEE AS CURRENT INJURY, INITIAL ENCOUNTER: Primary | ICD-10-CM

## 2024-02-07 DIAGNOSIS — S83.241A OTHER TEAR OF MEDIAL MENISCUS OF RIGHT KNEE AS CURRENT INJURY, INITIAL ENCOUNTER: ICD-10-CM

## 2024-02-07 PROCEDURE — 1159F MED LIST DOCD IN RCRD: CPT | Mod: CPTII,,, | Performed by: REHABILITATION UNIT

## 2024-02-07 PROCEDURE — 4010F ACE/ARB THERAPY RXD/TAKEN: CPT | Mod: CPTII,,, | Performed by: REHABILITATION UNIT

## 2024-02-07 PROCEDURE — 3075F SYST BP GE 130 - 139MM HG: CPT | Mod: CPTII,,, | Performed by: REHABILITATION UNIT

## 2024-02-07 PROCEDURE — 3008F BODY MASS INDEX DOCD: CPT | Mod: CPTII,,, | Performed by: REHABILITATION UNIT

## 2024-02-07 PROCEDURE — 99214 OFFICE O/P EST MOD 30 MIN: CPT | Mod: ,,, | Performed by: REHABILITATION UNIT

## 2024-02-07 PROCEDURE — 3078F DIAST BP <80 MM HG: CPT | Mod: CPTII,,, | Performed by: REHABILITATION UNIT

## 2024-02-07 NOTE — H&P (VIEW-ONLY)
Subjective:      Patient ID: Julianne Parker is a 64 y.o. female.    Chief Complaint: Knee Pain (Right knee MRI results)    HPI:   Julianne Parker is a 63 y.o. female who presents today for right knee MRI results.  She was last seen about 1 month ago.  Reports minimal relief with conservative measures such as Voltaren gel, physical therapy, and Mobic.  She has been attending physical therapy with minimal relief - unsure if this is improving her pain.  She rates the pain in her knee today as an 8/10.  Majority of the pain is to the lateral aspect of her tibia with subsequent pain to the lateral and medial side of her knee.      Previous HPI:  Julianne Parker is a 64 y.o. female who presents today for follow up evaluation of her right knee.  She was last seen around a month ago.  She was provided with a steroid injection.  She reports that this was helpful but has started to wane.  Pain is localized to the calf and no joint line pain.  No mechanical symptoms or instability.  No sensory or motor changes.  She reports that therapy never called her so she has not done any rehab.  Mobic has been helpful.    Initial HPI:  She is referred by Mikey Biggs FNP. Pain started in August. No trauma or event. Pain anterior. IM steroid shot with one day of relief. Taking tylneol and mobic with relief. No PT. she went to the emergency department and has also seen her primary care provider.  No instability or mechanical symptoms.  No sensory or motor changes distally.  Pain worse with activity and somewhat better with rest.  She has associated swelling that fluctuates.     Past Medical History:   Diagnosis Date    Bradycardia     Chronic back pain     Essential (primary) hypertension     Hypothyroidism, unspecified     Menopause     Microscopic hematuria     Nontoxic single thyroid nodule     Sleep disorder not due to a substance or known physiological condition, unspecified      Past Surgical History:   Procedure  Laterality Date    NO PAST SURGERIES       Social History     Socioeconomic History    Marital status:      Spouse name: Manas    Number of children: 2   Tobacco Use    Smoking status: Never     Passive exposure: Past    Smokeless tobacco: Never   Substance and Sexual Activity    Alcohol use: Never    Drug use: Never    Sexual activity: Yes     Partners: Male     Social Determinants of Health     Financial Resource Strain: Low Risk  (2/8/2023)    Overall Financial Resource Strain (CARDIA)     Difficulty of Paying Living Expenses: Not hard at all   Food Insecurity: No Food Insecurity (2/8/2023)    Hunger Vital Sign     Worried About Running Out of Food in the Last Year: Never true     Ran Out of Food in the Last Year: Never true   Transportation Needs: No Transportation Needs (2/8/2023)    PRAPARE - Transportation     Lack of Transportation (Medical): No     Lack of Transportation (Non-Medical): No   Physical Activity: Insufficiently Active (2/8/2023)    Exercise Vital Sign     Days of Exercise per Week: 2 days     Minutes of Exercise per Session: 40 min   Stress: No Stress Concern Present (2/8/2023)    [employer] - Occupational Stress Questionnaire     Feeling of Stress : Not at all   Social Connections: Socially Integrated (2/8/2023)    Social Connection and Isolation Panel [NHANES]     Frequency of Communication with Friends and Family: Twice a week     Frequency of Social Gatherings with Friends and Family: Once a week     Attends Spiritism Services: More than 4 times per year     Active Member of Clubs or Organizations: Yes     Attends Club or Organization Meetings: More than 4 times per year     Marital Status:    Housing Stability: Low Risk  (2/8/2023)    Housing Stability Vital Sign     Unable to Pay for Housing in the Last Year: No     Number of Places Lived in the Last Year: 1     Unstable Housing in the Last Year: No         Current Outpatient Medications:      "amLODIPine (NORVASC) 10 MG tablet, Take 1 tablet (10 mg total) by mouth once daily., Disp: 90 tablet, Rfl: 3    blood pressure monitor (BLOOD PRESSURE KIT) Kit, Use daily to assess blood pressure, Disp: 1 each, Rfl: 0    diclofenac sodium (VOLTAREN) 1 % Gel, Apply 2 g topically 4 (four) times daily., Disp: 100 g, Rfl: 0    fluticasone propionate (FLONASE) 50 mcg/actuation nasal spray, 1 spray (50 mcg total) by Each Nostril route 2 (two) times daily as needed for Rhinitis., Disp: 15 g, Rfl: 0    levothyroxine (SYNTHROID) 75 MCG tablet, Take 1 tablet (75 mcg total) by mouth before breakfast., Disp: 90 tablet, Rfl: 1    losartan (COZAAR) 50 MG tablet, Take 50 mg by mouth once daily., Disp: , Rfl:     meloxicam (MOBIC) 15 MG tablet, Take 15 mg by mouth daily as needed., Disp: , Rfl:   Review of patient's allergies indicates:   Allergen Reactions    Hydrochlorothiazide      Other reaction(s): rash  Rash    Hydrocortisone Other (See Comments)    Lisinopril Edema    Sulfamethoxazole-trimethoprim Other (See Comments)    Hydrobenzthiazide Rash       /71 (BP Location: Right arm, Patient Position: Sitting)   Pulse (!) 52   Ht 5' 7" (1.702 m)   Wt 67.1 kg (148 lb)   BMI 23.18 kg/m²     Comprehensive review of systems completed and negative except as per HPI.        Objective:   Head: Normocephalic, without obvious abnormality, atraumatic  Eyes: conjunctivae/corneas clear. EOM's intact  Ears: normal external appearance  Nose: Nares normal. Septum midline. Mucosa normal. No drainage  Throat: normal findings: lips normal without lesions  Lungs: unlabored breathing on room air  Chest wall: symmetric chest rise  Heart: regular rate and rhythm  Pulses: 2+ and symmetric  Skin: Skin color, texture, turgor normal. No rashes or lesions  Neurologic: Grossly normal    right KNEE:     Alignment: neutral  Appearance: skin in intact without lesion  Effusion: small  Gait: antalgic  Straight Leg Raise: negative  Log Roll: " negative  Hip ROM: full and painless  Ankle ROM: full and painless   Knee ROM:  0 - 130  Tenderness: TTP mild anterior.  Mild TTP over both the medial and lateral aspect of the knee.  Patellar Mobility: normal  Patellar grind: positive  J Sign: negative  PF Crepitus: negative        Surinder Test: positive  Valgus Stress @ 0 deg: stable  Valgus Stress @ 30 deg: stable  Varus Stress @ 0 deg: stable  Varus Stress @ 30 deg: stable  Lachman: stable  Ant Drawer: negative   Post Drawer: negative  Posterior Sag Sign: negative  Neurological deficits: SILT dp/sp/t distributions  Motor: 5/5 EHL/FHL/TA/GS    Warm well perfused lower extremity with capillary refill less than 2 seconds and sensation intact to light touch in the terminal nerve distributions. Calf soft and easily compressible without clinical sign of DVT. No palpable popliteal lymphadenopathy    Assessment:     Imaging:   MRI Knee Without Contrast Right  Narrative: EXAMINATION:  MRI KNEE WITHOUT CONTRAST RIGHT    CLINICAL HISTORY:  Knee pain, chronic, negative xray (Age >= 5y);Pain in right knee    TECHNIQUE:  Multiplanar, multisequence images were performed about the right knee.  No contrast was administered.    COMPARISON:  Radiographs of the knee used for comparison dated 09/05/2023    FINDINGS:  Cruciate ligaments are intact.  Collateral ligaments are intact. Popliteus tendon is intact. Arcuate ligament complex is intact.    A large complex tear is present to the anterior horn, body, and posterior horn of the lateral meniscus and demonstrates a dominant horizontal component. An oblique undersurface tear is present to the body and posterior horn of the medial meniscus. Grade 3 chondromalacia is present to the medial and lateral compartments.    A large knee joint effusion is present with synovitis in the suprapatellar pouch. The extensor mechanism is intact. A popliteal cyst is present. The included muscles are intact. Iliotibial band is  unremarkable.  Impression: A large complex tear is present to the anterior horn, body, and posterior horn of the lateral meniscus and demonstrates a dominant horizontal component.    An oblique undersurface tear is present to the body and posterior horn of the medial meniscus.    Grade 3 chondromalacia is present to the medial and lateral compartments    A large knee joint effusion is present with synovitis in the suprapatellar pouch    Electronically signed by: Abdelrahman Tapia  Date:    01/31/2024  Time:    12:36    Previously obtained radiographs reviewed showing mild degenerative changes.    MRI shows complex tear of the lateral meniscus as well as a tear of the posterior horn of the medial meniscus which is undersurface tear.  Effusion is present.  There are some areas of chondromalacia in the medial and lateral compartments.      1. Other tear of lateral meniscus of right knee as current injury, initial encounter    2. Other tear of medial meniscus of right knee as current injury, initial encounter    3. Preop testing            Plan:       Orders Placed This Encounter    X-Ray Chest PA And Lateral    Basic Metabolic Panel    CBC Auto Differential    EKG 12-lead    Case Request Operating Room - [facility]: ARTHROSCOPY, KNEE, WITH MENISCECTOMY     MRI findings were discussed with the patient.  She has a large complex tear to the lateral meniscus with an associated posterior horn medial meniscus tear.  She also has grade three chondromalacia to the medial and lateral compartments.  She is having true mechanical symptomatology and is interested in surgical options.  She has failed conservative measures.  We discussed operative and nonoperative options. The patient had an opportunity to ask questions. All questions were answered. The risks and benefits of surgery were discussed with the patient, including but not limited to bleeding, infection, damage to surrounding nerves and structures, need for further surgery, repair  failure, anesthesia risk, progression of arthritis, blood clots, and medical risks of surgery. The patient voiced understanding of the risks and benefits and provided written consent to the procedure. Plan for arthroscopic medial and lateral meniscectomies. Patient will be scheduled for surgery at a mutually convenient date on 2/22/2024.  Surgery and anticipated recovery course were discussed.  I did discuss that this would be beneficial for her mechanical symptoms and her pain but she may still have some persistent pain and issues due to the arthritic changes of her knee.  She understands this and is agreeable to going forward.  Questions were answered and she was again in agreement.    Leo Saucedo MD personally performed the services described in this documentation, including but not limited to patient's history, physical examination, and assessment and plan of care. All medical record entries made by KIM Sandoval were performed at his direction and in his presence. The medical record was reviewed and is accurate and complete.

## 2024-02-07 NOTE — PROGRESS NOTES
Subjective:      Patient ID: Julianne Parker is a 64 y.o. female.    Chief Complaint: Knee Pain (Right knee MRI results)    HPI:   Julianne Parker is a 63 y.o. female who presents today for right knee MRI results.  She was last seen about 1 month ago.  Reports minimal relief with conservative measures such as Voltaren gel, physical therapy, and Mobic.  She has been attending physical therapy with minimal relief - unsure if this is improving her pain.  She rates the pain in her knee today as an 8/10.  Majority of the pain is to the lateral aspect of her tibia with subsequent pain to the lateral and medial side of her knee.      Previous HPI:  Julianne Parker is a 64 y.o. female who presents today for follow up evaluation of her right knee.  She was last seen around a month ago.  She was provided with a steroid injection.  She reports that this was helpful but has started to wane.  Pain is localized to the calf and no joint line pain.  No mechanical symptoms or instability.  No sensory or motor changes.  She reports that therapy never called her so she has not done any rehab.  Mobic has been helpful.    Initial HPI:  She is referred by Mikey Biggs FNP. Pain started in August. No trauma or event. Pain anterior. IM steroid shot with one day of relief. Taking tylneol and mobic with relief. No PT. she went to the emergency department and has also seen her primary care provider.  No instability or mechanical symptoms.  No sensory or motor changes distally.  Pain worse with activity and somewhat better with rest.  She has associated swelling that fluctuates.     Past Medical History:   Diagnosis Date    Bradycardia     Chronic back pain     Essential (primary) hypertension     Hypothyroidism, unspecified     Menopause     Microscopic hematuria     Nontoxic single thyroid nodule     Sleep disorder not due to a substance or known physiological condition, unspecified      Past Surgical History:   Procedure  Laterality Date    NO PAST SURGERIES       Social History     Socioeconomic History    Marital status:      Spouse name: Manas    Number of children: 2   Tobacco Use    Smoking status: Never     Passive exposure: Past    Smokeless tobacco: Never   Substance and Sexual Activity    Alcohol use: Never    Drug use: Never    Sexual activity: Yes     Partners: Male     Social Determinants of Health     Financial Resource Strain: Low Risk  (2/8/2023)    Overall Financial Resource Strain (CARDIA)     Difficulty of Paying Living Expenses: Not hard at all   Food Insecurity: No Food Insecurity (2/8/2023)    Hunger Vital Sign     Worried About Running Out of Food in the Last Year: Never true     Ran Out of Food in the Last Year: Never true   Transportation Needs: No Transportation Needs (2/8/2023)    PRAPARE - Transportation     Lack of Transportation (Medical): No     Lack of Transportation (Non-Medical): No   Physical Activity: Insufficiently Active (2/8/2023)    Exercise Vital Sign     Days of Exercise per Week: 2 days     Minutes of Exercise per Session: 40 min   Stress: No Stress Concern Present (2/8/2023)    [employer] - Occupational Stress Questionnaire     Feeling of Stress : Not at all   Social Connections: Socially Integrated (2/8/2023)    Social Connection and Isolation Panel [NHANES]     Frequency of Communication with Friends and Family: Twice a week     Frequency of Social Gatherings with Friends and Family: Once a week     Attends [employer] Services: More than 4 times per year     Active Member of Clubs or Organizations: Yes     Attends Club or Organization Meetings: More than 4 times per year     Marital Status:    Housing Stability: Low Risk  (2/8/2023)    Housing Stability Vital Sign     Unable to Pay for Housing in the Last Year: No     Number of Places Lived in the Last Year: 1     Unstable Housing in the Last Year: No         Current Outpatient Medications:      "amLODIPine (NORVASC) 10 MG tablet, Take 1 tablet (10 mg total) by mouth once daily., Disp: 90 tablet, Rfl: 3    blood pressure monitor (BLOOD PRESSURE KIT) Kit, Use daily to assess blood pressure, Disp: 1 each, Rfl: 0    diclofenac sodium (VOLTAREN) 1 % Gel, Apply 2 g topically 4 (four) times daily., Disp: 100 g, Rfl: 0    fluticasone propionate (FLONASE) 50 mcg/actuation nasal spray, 1 spray (50 mcg total) by Each Nostril route 2 (two) times daily as needed for Rhinitis., Disp: 15 g, Rfl: 0    levothyroxine (SYNTHROID) 75 MCG tablet, Take 1 tablet (75 mcg total) by mouth before breakfast., Disp: 90 tablet, Rfl: 1    losartan (COZAAR) 50 MG tablet, Take 50 mg by mouth once daily., Disp: , Rfl:     meloxicam (MOBIC) 15 MG tablet, Take 15 mg by mouth daily as needed., Disp: , Rfl:   Review of patient's allergies indicates:   Allergen Reactions    Hydrochlorothiazide      Other reaction(s): rash  Rash    Hydrocortisone Other (See Comments)    Lisinopril Edema    Sulfamethoxazole-trimethoprim Other (See Comments)    Hydrobenzthiazide Rash       /71 (BP Location: Right arm, Patient Position: Sitting)   Pulse (!) 52   Ht 5' 7" (1.702 m)   Wt 67.1 kg (148 lb)   BMI 23.18 kg/m²     Comprehensive review of systems completed and negative except as per HPI.        Objective:   Head: Normocephalic, without obvious abnormality, atraumatic  Eyes: conjunctivae/corneas clear. EOM's intact  Ears: normal external appearance  Nose: Nares normal. Septum midline. Mucosa normal. No drainage  Throat: normal findings: lips normal without lesions  Lungs: unlabored breathing on room air  Chest wall: symmetric chest rise  Heart: regular rate and rhythm  Pulses: 2+ and symmetric  Skin: Skin color, texture, turgor normal. No rashes or lesions  Neurologic: Grossly normal    right KNEE:     Alignment: neutral  Appearance: skin in intact without lesion  Effusion: small  Gait: antalgic  Straight Leg Raise: negative  Log Roll: " negative  Hip ROM: full and painless  Ankle ROM: full and painless   Knee ROM:  0 - 130  Tenderness: TTP mild anterior.  Mild TTP over both the medial and lateral aspect of the knee.  Patellar Mobility: normal  Patellar grind: positive  J Sign: negative  PF Crepitus: negative        Surinder Test: positive  Valgus Stress @ 0 deg: stable  Valgus Stress @ 30 deg: stable  Varus Stress @ 0 deg: stable  Varus Stress @ 30 deg: stable  Lachman: stable  Ant Drawer: negative   Post Drawer: negative  Posterior Sag Sign: negative  Neurological deficits: SILT dp/sp/t distributions  Motor: 5/5 EHL/FHL/TA/GS    Warm well perfused lower extremity with capillary refill less than 2 seconds and sensation intact to light touch in the terminal nerve distributions. Calf soft and easily compressible without clinical sign of DVT. No palpable popliteal lymphadenopathy    Assessment:     Imaging:   MRI Knee Without Contrast Right  Narrative: EXAMINATION:  MRI KNEE WITHOUT CONTRAST RIGHT    CLINICAL HISTORY:  Knee pain, chronic, negative xray (Age >= 5y);Pain in right knee    TECHNIQUE:  Multiplanar, multisequence images were performed about the right knee.  No contrast was administered.    COMPARISON:  Radiographs of the knee used for comparison dated 09/05/2023    FINDINGS:  Cruciate ligaments are intact.  Collateral ligaments are intact. Popliteus tendon is intact. Arcuate ligament complex is intact.    A large complex tear is present to the anterior horn, body, and posterior horn of the lateral meniscus and demonstrates a dominant horizontal component. An oblique undersurface tear is present to the body and posterior horn of the medial meniscus. Grade 3 chondromalacia is present to the medial and lateral compartments.    A large knee joint effusion is present with synovitis in the suprapatellar pouch. The extensor mechanism is intact. A popliteal cyst is present. The included muscles are intact. Iliotibial band is  unremarkable.  Impression: A large complex tear is present to the anterior horn, body, and posterior horn of the lateral meniscus and demonstrates a dominant horizontal component.    An oblique undersurface tear is present to the body and posterior horn of the medial meniscus.    Grade 3 chondromalacia is present to the medial and lateral compartments    A large knee joint effusion is present with synovitis in the suprapatellar pouch    Electronically signed by: Abdelrahman Tapia  Date:    01/31/2024  Time:    12:36    Previously obtained radiographs reviewed showing mild degenerative changes.    MRI shows complex tear of the lateral meniscus as well as a tear of the posterior horn of the medial meniscus which is undersurface tear.  Effusion is present.  There are some areas of chondromalacia in the medial and lateral compartments.      1. Other tear of lateral meniscus of right knee as current injury, initial encounter    2. Other tear of medial meniscus of right knee as current injury, initial encounter    3. Preop testing            Plan:       Orders Placed This Encounter    X-Ray Chest PA And Lateral    Basic Metabolic Panel    CBC Auto Differential    EKG 12-lead    Case Request Operating Room - [facility]: ARTHROSCOPY, KNEE, WITH MENISCECTOMY     MRI findings were discussed with the patient.  She has a large complex tear to the lateral meniscus with an associated posterior horn medial meniscus tear.  She also has grade three chondromalacia to the medial and lateral compartments.  She is having true mechanical symptomatology and is interested in surgical options.  She has failed conservative measures.  We discussed operative and nonoperative options. The patient had an opportunity to ask questions. All questions were answered. The risks and benefits of surgery were discussed with the patient, including but not limited to bleeding, infection, damage to surrounding nerves and structures, need for further surgery, repair  failure, anesthesia risk, progression of arthritis, blood clots, and medical risks of surgery. The patient voiced understanding of the risks and benefits and provided written consent to the procedure. Plan for arthroscopic medial and lateral meniscectomies. Patient will be scheduled for surgery at a mutually convenient date on 2/22/2024.  Surgery and anticipated recovery course were discussed.  I did discuss that this would be beneficial for her mechanical symptoms and her pain but she may still have some persistent pain and issues due to the arthritic changes of her knee.  She understands this and is agreeable to going forward.  Questions were answered and she was again in agreement.    Leo Saucedo MD personally performed the services described in this documentation, including but not limited to patient's history, physical examination, and assessment and plan of care. All medical record entries made by KIM Sandoval were performed at his direction and in his presence. The medical record was reviewed and is accurate and complete.

## 2024-02-09 ENCOUNTER — HOSPITAL ENCOUNTER (EMERGENCY)
Facility: HOSPITAL | Age: 64
Discharge: HOME | End: 2024-02-09
Attending: INTERNAL MEDICINE
Payer: MEDICARE

## 2024-02-09 VITALS
DIASTOLIC BLOOD PRESSURE: 74 MMHG | HEART RATE: 59 BPM | TEMPERATURE: 97 F | WEIGHT: 149.94 LBS | SYSTOLIC BLOOD PRESSURE: 136 MMHG | BODY MASS INDEX: 23.48 KG/M2 | OXYGEN SATURATION: 97 % | RESPIRATION RATE: 18 BRPM

## 2024-02-09 DIAGNOSIS — L85.3 DRY SKIN: ICD-10-CM

## 2024-02-09 DIAGNOSIS — R21 RASH: Primary | ICD-10-CM

## 2024-02-09 PROCEDURE — 99284 EMERGENCY DEPT VISIT MOD MDM: CPT

## 2024-02-09 RX ORDER — TRIAMCINOLONE ACETONIDE 1 MG/G
CREAM TOPICAL 2 TIMES DAILY
Qty: 45 G | Refills: 0 | Status: SHIPPED | OUTPATIENT
Start: 2024-02-09 | End: 2024-02-12

## 2024-02-09 RX ORDER — CAPSAICIN 0.1 %
CREAM (GRAM) TOPICAL
Qty: 453 G | Refills: 0 | Status: SHIPPED | OUTPATIENT
Start: 2024-02-09

## 2024-02-09 NOTE — ED PROVIDER NOTES
Encounter Date: 2/9/2024       History     Chief Complaint   Patient presents with    Rash     ITCHY RASH TO LOWER LEGS > 1 MONTH.       Patient with pmhx of HTN, hypothyroidism, and bradycardia presents today c/o rash to bilateral distal lower extremities for 1 month. Patient says rash is pruritic. It isn't painful. Rash today looks different from when it first started, but she doesn't remember what it initially looked like. She has been using Jergens cherry scented lotion to moisturize. Denies use of any new products.     The history is provided by the patient. No  was used.     Review of patient's allergies indicates:   Allergen Reactions    Hydrochlorothiazide      Other reaction(s): rash  Rash    Hydrocortisone Other (See Comments)    Lisinopril Edema    Sulfamethoxazole-trimethoprim Other (See Comments)    Hydrobenzthiazide Rash     Past Medical History:   Diagnosis Date    Bradycardia     Chronic back pain     Essential (primary) hypertension     Hypothyroidism, unspecified     Menopause     Microscopic hematuria     Nontoxic single thyroid nodule     Sleep disorder not due to a substance or known physiological condition, unspecified      Past Surgical History:   Procedure Laterality Date    NO PAST SURGERIES       Family History   Problem Relation Age of Onset    Diabetes type II Mother     Hypertension Mother     Hypertension Father      Social History     Tobacco Use    Smoking status: Never     Passive exposure: Past    Smokeless tobacco: Never   Substance Use Topics    Alcohol use: Never    Drug use: Never     Review of Systems   Constitutional:  Negative for chills and fever.   Respiratory:  Negative for cough, chest tightness and shortness of breath.    Cardiovascular:  Negative for chest pain.   Gastrointestinal:  Negative for abdominal pain, diarrhea, nausea and vomiting.   Genitourinary:  Negative for flank pain.   Musculoskeletal:  Negative for arthralgias and myalgias.   Skin:   Positive for rash.   Neurological:  Negative for syncope, light-headedness and headaches.   All other systems reviewed and are negative.      Physical Exam     Initial Vitals [02/09/24 1159]   BP Pulse Resp Temp SpO2   136/74 (!) 59 18 97.2 °F (36.2 °C) 97 %      MAP       --         Physical Exam    Nursing note and vitals reviewed.  Constitutional: She appears well-developed and well-nourished. She is not diaphoretic. No distress.   HENT:   Head: Normocephalic and atraumatic.   Mouth/Throat: Oropharynx is clear and moist. No oropharyngeal exudate.   Eyes: Conjunctivae and EOM are normal.   Neck: Neck supple.   Normal range of motion.  Cardiovascular:  Normal rate, regular rhythm, normal heart sounds and intact distal pulses.           Pulmonary/Chest: Breath sounds normal. No respiratory distress.   Abdominal: Abdomen is soft. She exhibits no distension. There is no abdominal tenderness.   Musculoskeletal:         General: No edema.      Cervical back: Normal range of motion and neck supple.     Neurological: She is alert and oriented to person, place, and time. GCS score is 15. GCS eye subscore is 4. GCS verbal subscore is 5. GCS motor subscore is 6.   Skin: Skin is warm and dry. Capillary refill takes less than 2 seconds.   Right distal anterior lower extremity with large dry scaly patch. Lichenification noted. Left medial ankle region with similar appearing lesion. No vesicles. No erythema or warmth. Rash is non-tender.    Psychiatric: She has a normal mood and affect.         ED Course   Procedures  Labs Reviewed - No data to display       Imaging Results    None          Medications - No data to display  Medical Decision Making  Patient presents with pruritic rash and dry skin    Ddx: dry skin, eczema, psoriasis, contact dermatitis, amongst others    Patient is non-toxic appearing. Vitals stable. Recommend topical steroid and applying moisturizing cream frequently. Patient has documented allergy to  hydrocortisone, but does not remember what happened or if it was the topical form. She understands that triamcinolone is a topical steroid and feels comfortable trying it. Advised to f/u with pcp. Stable for discharge. ED precautions given.     Amount and/or Complexity of Data Reviewed  External Data Reviewed: notes.                                      Clinical Impression:  Final diagnoses:  [R21] Rash (Primary)  [L85.3] Dry skin          ED Disposition Condition    Discharge Stable          ED Prescriptions       Medication Sig Dispense Start Date End Date Auth. Provider    triamcinolone acetonide 0.1% (KENALOG) 0.1 % cream Apply topically 2 (two) times daily. for 7 days 45 g 2/9/2024 2/16/2024 Sona Sinha PA    ceramides 1,3,6-II (CERAVE) Crea Apply as needed 453 g 2/9/2024 -- Sona Sinha PA          Follow-up Information       Follow up With Specialties Details Why Contact Info    [facility] - Emergency Dept Emergency Medicine  If symptoms worsen return to ED immediately [address] [facility] [place] [id number] [phone]    Mikey Biggs FNP Family Medicine In 2 days  [address] [facility] [address]  [phone]               Sona Sinha PA  02/09/24 1785

## 2024-02-12 RX ORDER — CHOLECALCIFEROL (VITAMIN D3) 25 MCG
2000 TABLET ORAL DAILY
COMMUNITY

## 2024-02-15 ENCOUNTER — HOSPITAL ENCOUNTER (OUTPATIENT)
Dept: RADIOLOGY | Facility: HOSPITAL | Age: 64
Discharge: HOME | End: 2024-02-15
Attending: REHABILITATION UNIT
Payer: MEDICARE

## 2024-02-15 DIAGNOSIS — Z01.818 PREOP TESTING: ICD-10-CM

## 2024-02-15 PROCEDURE — 71046 X-RAY EXAM CHEST 2 VIEWS: CPT | Mod: TC

## 2024-02-19 ENCOUNTER — ANESTHESIA EVENT (OUTPATIENT)
Dept: SURGERY | Facility: HOSPITAL | Age: 64
End: 2024-02-19
Payer: MEDICARE

## 2024-02-22 ENCOUNTER — HOSPITAL ENCOUNTER (OUTPATIENT)
Facility: HOSPITAL | Age: 64
Discharge: HOME | End: 2024-02-22
Attending: REHABILITATION UNIT | Admitting: REHABILITATION UNIT
Payer: MEDICARE

## 2024-02-22 ENCOUNTER — ANESTHESIA (OUTPATIENT)
Dept: SURGERY | Facility: HOSPITAL | Age: 64
End: 2024-02-22
Payer: MEDICARE

## 2024-02-22 DIAGNOSIS — S83.281A OTHER TEAR OF LATERAL MENISCUS OF RIGHT KNEE AS CURRENT INJURY, INITIAL ENCOUNTER: ICD-10-CM

## 2024-02-22 DIAGNOSIS — S83.241A OTHER TEAR OF MEDIAL MENISCUS OF RIGHT KNEE AS CURRENT INJURY, INITIAL ENCOUNTER: ICD-10-CM

## 2024-02-22 PROCEDURE — 71000015 HC POSTOP RECOV 1ST HR: Performed by: REHABILITATION UNIT

## 2024-02-22 PROCEDURE — 63600175 PHARM REV CODE 636 W HCPCS: Performed by: NURSE ANESTHETIST, CERTIFIED REGISTERED

## 2024-02-22 PROCEDURE — 25000003 PHARM REV CODE 250: Performed by: ANESTHESIOLOGY

## 2024-02-22 PROCEDURE — 29880 ARTHRS KNE SRG MNISECTMY M&L: CPT | Mod: RT,,, | Performed by: REHABILITATION UNIT

## 2024-02-22 PROCEDURE — 25000003 PHARM REV CODE 250: Performed by: REHABILITATION UNIT

## 2024-02-22 PROCEDURE — 36000710: Performed by: REHABILITATION UNIT

## 2024-02-22 PROCEDURE — 36000711: Performed by: REHABILITATION UNIT

## 2024-02-22 PROCEDURE — 27201423 OPTIME MED/SURG SUP & DEVICES STERILE SUPPLY: Performed by: REHABILITATION UNIT

## 2024-02-22 PROCEDURE — D9220A PRA ANESTHESIA: Mod: CRNA,,, | Performed by: NURSE ANESTHETIST, CERTIFIED REGISTERED

## 2024-02-22 PROCEDURE — 37000009 HC ANESTHESIA EA ADD 15 MINS: Performed by: REHABILITATION UNIT

## 2024-02-22 PROCEDURE — D9220A PRA ANESTHESIA: Mod: ANES,,, | Performed by: ANESTHESIOLOGY

## 2024-02-22 PROCEDURE — 25000003 PHARM REV CODE 250: Performed by: NURSE ANESTHETIST, CERTIFIED REGISTERED

## 2024-02-22 PROCEDURE — 71000033 HC RECOVERY, INTIAL HOUR: Performed by: REHABILITATION UNIT

## 2024-02-22 PROCEDURE — 29880 ARTHRS KNE SRG MNISECTMY M&L: CPT | Mod: AS,RT,, | Performed by: NURSE PRACTITIONER

## 2024-02-22 PROCEDURE — 37000008 HC ANESTHESIA 1ST 15 MINUTES: Performed by: REHABILITATION UNIT

## 2024-02-22 RX ORDER — ONDANSETRON HYDROCHLORIDE 2 MG/ML
INJECTION, SOLUTION INTRAVENOUS
Status: DISCONTINUED | OUTPATIENT
Start: 2024-02-22 | End: 2024-02-22

## 2024-02-22 RX ORDER — ALUMINUM HYDROXIDE, MAGNESIUM HYDROXIDE, AND SIMETHICONE 1200; 120; 1200 MG/30ML; MG/30ML; MG/30ML
30 SUSPENSION ORAL EVERY 6 HOURS PRN
Status: DISCONTINUED | OUTPATIENT
Start: 2024-02-22 | End: 2024-02-22 | Stop reason: HOSPADM

## 2024-02-22 RX ORDER — SODIUM CHLORIDE, SODIUM GLUCONATE, SODIUM ACETATE, POTASSIUM CHLORIDE AND MAGNESIUM CHLORIDE 30; 37; 368; 526; 502 MG/100ML; MG/100ML; MG/100ML; MG/100ML; MG/100ML
INJECTION, SOLUTION INTRAVENOUS CONTINUOUS
Status: DISCONTINUED | OUTPATIENT
Start: 2024-02-22 | End: 2024-02-22 | Stop reason: HOSPADM

## 2024-02-22 RX ORDER — HYDROCODONE BITARTRATE AND ACETAMINOPHEN 5; 325 MG/1; MG/1
1 TABLET ORAL EVERY 6 HOURS PRN
Qty: 20 TABLET | Refills: 0 | Status: SHIPPED | OUTPATIENT
Start: 2024-02-22 | End: 2024-03-18

## 2024-02-22 RX ORDER — ONDANSETRON 4 MG/1
4 TABLET, ORALLY DISINTEGRATING ORAL EVERY 6 HOURS PRN
Qty: 10 TABLET | Refills: 0 | Status: SHIPPED | OUTPATIENT
Start: 2024-02-22 | End: 2024-03-03

## 2024-02-22 RX ORDER — PROPOFOL 10 MG/ML
INJECTION, EMULSION INTRAVENOUS
Status: DISCONTINUED | OUTPATIENT
Start: 2024-02-22 | End: 2024-02-22

## 2024-02-22 RX ORDER — SODIUM CHLORIDE, SODIUM LACTATE, POTASSIUM CHLORIDE, CALCIUM CHLORIDE 600; 310; 30; 20 MG/100ML; MG/100ML; MG/100ML; MG/100ML
INJECTION, SOLUTION INTRAVENOUS CONTINUOUS
Status: DISCONTINUED | OUTPATIENT
Start: 2024-02-22 | End: 2024-02-22 | Stop reason: HOSPADM

## 2024-02-22 RX ORDER — DEXAMETHASONE SODIUM PHOSPHATE 4 MG/ML
INJECTION, SOLUTION INTRA-ARTICULAR; INTRALESIONAL; INTRAMUSCULAR; INTRAVENOUS; SOFT TISSUE
Status: DISCONTINUED | OUTPATIENT
Start: 2024-02-22 | End: 2024-02-22

## 2024-02-22 RX ORDER — MORPHINE SULFATE 4 MG/ML
3 INJECTION, SOLUTION INTRAMUSCULAR; INTRAVENOUS
Status: DISCONTINUED | OUTPATIENT
Start: 2024-02-22 | End: 2024-02-22 | Stop reason: HOSPADM

## 2024-02-22 RX ORDER — LIDOCAINE HYDROCHLORIDE AND EPINEPHRINE 20; 10 MG/ML; UG/ML
INJECTION, SOLUTION INFILTRATION; PERINEURAL
Status: DISCONTINUED
Start: 2024-02-22 | End: 2024-02-22 | Stop reason: HOSPADM

## 2024-02-22 RX ORDER — HYDROCODONE BITARTRATE AND ACETAMINOPHEN 5; 325 MG/1; MG/1
1 TABLET ORAL EVERY 4 HOURS PRN
Status: DISCONTINUED | OUTPATIENT
Start: 2024-02-22 | End: 2024-02-22 | Stop reason: HOSPADM

## 2024-02-22 RX ORDER — MIDAZOLAM HYDROCHLORIDE 1 MG/ML
2 INJECTION INTRAMUSCULAR; INTRAVENOUS ONCE AS NEEDED
Status: DISCONTINUED | OUTPATIENT
Start: 2024-02-22 | End: 2024-02-22 | Stop reason: HOSPADM

## 2024-02-22 RX ORDER — LIDOCAINE HYDROCHLORIDE 20 MG/ML
INJECTION, SOLUTION EPIDURAL; INFILTRATION; INTRACAUDAL; PERINEURAL
Status: DISCONTINUED | OUTPATIENT
Start: 2024-02-22 | End: 2024-02-22

## 2024-02-22 RX ORDER — FENTANYL CITRATE 50 UG/ML
INJECTION, SOLUTION INTRAMUSCULAR; INTRAVENOUS
Status: DISCONTINUED | OUTPATIENT
Start: 2024-02-22 | End: 2024-02-22

## 2024-02-22 RX ORDER — PHENYLEPHRINE HCL IN 0.9% NACL 1 MG/10 ML
SYRINGE (ML) INTRAVENOUS
Status: DISCONTINUED | OUTPATIENT
Start: 2024-02-22 | End: 2024-02-22

## 2024-02-22 RX ORDER — ONDANSETRON HYDROCHLORIDE 2 MG/ML
4 INJECTION, SOLUTION INTRAVENOUS DAILY PRN
Status: DISCONTINUED | OUTPATIENT
Start: 2024-02-22 | End: 2024-02-22 | Stop reason: HOSPADM

## 2024-02-22 RX ORDER — MIDAZOLAM HYDROCHLORIDE 1 MG/ML
INJECTION INTRAMUSCULAR; INTRAVENOUS
Status: DISCONTINUED | OUTPATIENT
Start: 2024-02-22 | End: 2024-02-22

## 2024-02-22 RX ORDER — ONDANSETRON 4 MG/1
4 TABLET, ORALLY DISINTEGRATING ORAL
Status: COMPLETED | OUTPATIENT
Start: 2024-02-22 | End: 2024-02-22

## 2024-02-22 RX ORDER — LIDOCAINE HYDROCHLORIDE 10 MG/ML
1 INJECTION, SOLUTION EPIDURAL; INFILTRATION; INTRACAUDAL; PERINEURAL ONCE
Status: DISCONTINUED | OUTPATIENT
Start: 2024-02-22 | End: 2024-02-22 | Stop reason: HOSPADM

## 2024-02-22 RX ORDER — LIDOCAINE HYDROCHLORIDE AND EPINEPHRINE 20; 10 MG/ML; UG/ML
INJECTION, SOLUTION INFILTRATION; PERINEURAL
Status: DISCONTINUED | OUTPATIENT
Start: 2024-02-22 | End: 2024-02-22 | Stop reason: HOSPADM

## 2024-02-22 RX ORDER — ASPIRIN 81 MG/1
81 TABLET ORAL DAILY
Qty: 14 TABLET | Refills: 0 | Status: SHIPPED | OUTPATIENT
Start: 2024-02-22 | End: 2024-03-07

## 2024-02-22 RX ORDER — MEPERIDINE HYDROCHLORIDE 25 MG/ML
6.25 INJECTION INTRAMUSCULAR; INTRAVENOUS; SUBCUTANEOUS ONCE AS NEEDED
Status: DISCONTINUED | OUTPATIENT
Start: 2024-02-22 | End: 2024-02-22 | Stop reason: HOSPADM

## 2024-02-22 RX ORDER — METHOCARBAMOL 500 MG/1
1000 TABLET, FILM COATED ORAL EVERY 6 HOURS PRN
Status: DISCONTINUED | OUTPATIENT
Start: 2024-02-22 | End: 2024-02-22 | Stop reason: HOSPADM

## 2024-02-22 RX ORDER — TRAMADOL HYDROCHLORIDE 50 MG/1
50 TABLET ORAL
Status: COMPLETED | OUTPATIENT
Start: 2024-02-22 | End: 2024-02-22

## 2024-02-22 RX ORDER — HYDROCODONE BITARTRATE AND ACETAMINOPHEN 5; 325 MG/1; MG/1
1 TABLET ORAL
Status: DISCONTINUED | OUTPATIENT
Start: 2024-02-22 | End: 2024-02-22 | Stop reason: HOSPADM

## 2024-02-22 RX ORDER — SODIUM CHLORIDE 9 MG/ML
INJECTION, SOLUTION INTRAVENOUS CONTINUOUS
Status: DISCONTINUED | OUTPATIENT
Start: 2024-02-22 | End: 2024-02-22 | Stop reason: HOSPADM

## 2024-02-22 RX ORDER — PROCHLORPERAZINE EDISYLATE 5 MG/ML
5 INJECTION INTRAMUSCULAR; INTRAVENOUS EVERY 30 MIN PRN
Status: DISCONTINUED | OUTPATIENT
Start: 2024-02-22 | End: 2024-02-22 | Stop reason: HOSPADM

## 2024-02-22 RX ORDER — ACETAMINOPHEN 500 MG
1000 TABLET ORAL
Status: COMPLETED | OUTPATIENT
Start: 2024-02-22 | End: 2024-02-22

## 2024-02-22 RX ORDER — HYDROMORPHONE HYDROCHLORIDE 2 MG/ML
0.4 INJECTION, SOLUTION INTRAMUSCULAR; INTRAVENOUS; SUBCUTANEOUS EVERY 5 MIN PRN
Status: DISCONTINUED | OUTPATIENT
Start: 2024-02-22 | End: 2024-02-22 | Stop reason: HOSPADM

## 2024-02-22 RX ORDER — ONDANSETRON HYDROCHLORIDE 2 MG/ML
4 INJECTION, SOLUTION INTRAVENOUS EVERY 6 HOURS PRN
Status: DISCONTINUED | OUTPATIENT
Start: 2024-02-22 | End: 2024-02-22 | Stop reason: HOSPADM

## 2024-02-22 RX ORDER — GABAPENTIN 300 MG/1
300 CAPSULE ORAL
Status: COMPLETED | OUTPATIENT
Start: 2024-02-22 | End: 2024-02-22

## 2024-02-22 RX ORDER — GLYCOPYRROLATE 0.2 MG/ML
INJECTION INTRAMUSCULAR; INTRAVENOUS
Status: DISCONTINUED | OUTPATIENT
Start: 2024-02-22 | End: 2024-02-22

## 2024-02-22 RX ORDER — METOCLOPRAMIDE HYDROCHLORIDE 5 MG/ML
10 INJECTION INTRAMUSCULAR; INTRAVENOUS EVERY 6 HOURS PRN
Status: DISCONTINUED | OUTPATIENT
Start: 2024-02-22 | End: 2024-02-22 | Stop reason: HOSPADM

## 2024-02-22 RX ADMIN — GABAPENTIN 300 MG: 300 CAPSULE ORAL at 06:02

## 2024-02-22 RX ADMIN — MIDAZOLAM 2 MG: 1 INJECTION INTRAMUSCULAR; INTRAVENOUS at 08:02

## 2024-02-22 RX ADMIN — TRAMADOL HYDROCHLORIDE 50 MG: 50 TABLET, COATED ORAL at 06:02

## 2024-02-22 RX ADMIN — DEXAMETHASONE SODIUM PHOSPHATE 4 MG: 4 INJECTION, SOLUTION INTRA-ARTICULAR; INTRALESIONAL; INTRAMUSCULAR; INTRAVENOUS; SOFT TISSUE at 09:02

## 2024-02-22 RX ADMIN — SODIUM CHLORIDE, SODIUM GLUCONATE, SODIUM ACETATE, POTASSIUM CHLORIDE AND MAGNESIUM CHLORIDE: 526; 502; 368; 37; 30 INJECTION, SOLUTION INTRAVENOUS at 08:02

## 2024-02-22 RX ADMIN — LIDOCAINE HYDROCHLORIDE 50 MG: 20 INJECTION, SOLUTION EPIDURAL; INFILTRATION; INTRACAUDAL; PERINEURAL at 09:02

## 2024-02-22 RX ADMIN — FENTANYL CITRATE 50 MCG: 50 INJECTION, SOLUTION INTRAMUSCULAR; INTRAVENOUS at 09:02

## 2024-02-22 RX ADMIN — Medication 200 MCG: at 09:02

## 2024-02-22 RX ADMIN — ONDANSETRON 4 MG: 4 TABLET, ORALLY DISINTEGRATING ORAL at 06:02

## 2024-02-22 RX ADMIN — ACETAMINOPHEN 1000 MG: 500 TABLET ORAL at 06:02

## 2024-02-22 RX ADMIN — DEXTROSE 2 G: 50 INJECTION, SOLUTION INTRAVENOUS at 09:02

## 2024-02-22 RX ADMIN — GLYCOPYRROLATE 0.1 MG: 0.2 INJECTION INTRAMUSCULAR; INTRAVENOUS at 08:02

## 2024-02-22 RX ADMIN — ONDANSETRON HYDROCHLORIDE 4 MG: 2 SOLUTION INTRAMUSCULAR; INTRAVENOUS at 09:02

## 2024-02-22 RX ADMIN — PROPOFOL 150 MG: 10 INJECTION, EMULSION INTRAVENOUS at 09:02

## 2024-02-22 NOTE — OP NOTE
Operative report     Date of Operative Procedure: 2024       Location: [place] [address]    Name: Julianne Parker   : 1960, MRN: [id number]    Diagnoses:    Pre-Op Diagnosis Codes:     * Other tear of lateral meniscus of right knee as current injury, initial encounter [S83.281A]  R knee medial meniscus tear    Post-Op Diagnosis Codes:     * Other tear of lateral meniscus of right knee as current injury, initial encounter [S83.281A]  R knee medial meniscus tear    Procedure(s):  Right knee arthroscopy and partial Medial and Lateral meniscectomies     Attending Surgeon:   eLo Saucedo MD    Assistant(s):  IMANI Sandoval was essential for manipulation of the extremity and closure.      Anesthesia: General      ASA: II    Antibiotics: Ancef 2g    Estimated Blood Loss: minimal; less than 2cc     Specimen: none     Indications for Procedure:   Julianne Parker is an 64 y.o. female who is having surgery for right knee medial and lateral meniscus tears. The patient was found to have a symptomatic right Medial and Lateral meniscus tears on physical exam and imaging. This was symptomatic and refractory to nonoperative treatment including physical therapy, activity modification, CSI, and oral antiinflammatory drugs. As such the patient was indicated for knee arthroscopy with partial Medial, Lateral meniscectomy. The risks and benefits of the operation were discussed including, but not limited to: bleeding, infection, damage to surrounding nerves and structures, need for additional procedures, development or progression of osteoarthritis, continued pain, stiffness, blood clots, and medical risks of anesthesia.  Written informed surgical consent was obtained.     Narrative:  The correct patient was identified in the preoperative holding area and the operative extremity was marked and procedure confirmed with the patient.  The patient was then taken to the operating room and placed supine on a  standard bed.  General anesthesia was induced and preoperative antibiotics were administered. All kvng prominences were well padded. The patient was then prepped and draped in the usual sterile fashion.  Prior to commencement of the procedure a universal precautions timeout was performed identifying the correct patient, site, side, and operative procedure to be performed.       The right lower extremity was examined and demonstrated full range of motion from 0 to 130 degrees, small effusion, stable to varus valgus stress at 0 and 30 degrees, negative anterior and posterior drawer test, and negative Lachman.     Esmarch bandage was used to exsanguinate the limb and the tourniquet was insufflated.     Diagnostic arthroscopy was commenced through the standard lateral portal. The medial portal was established under direct visualization. This demonstrated the following arthroscopic findings:    Patella:  grade III chondromalacia   Trochlea: grade II chondromalacia   Medial femoral condyle: grade III chondromalacia   Medial tibial plateau: grade II chondromalacia   Medial meniscus: horizontal undersurface tear of the PH  Anterior cruciate ligament:  Intact and stable to probing  Posterior cruciate ligament: Intact and stable to probing  Lateral femoral condyle: grade II chondromalacia   Lateral tibial plateau: area of grade IV chondromalacia   Lateral meniscus: degenerative and complex tear from posterior horn to anterior horn   No loose bodies in the suprapatellar pouch, medial gutter, or lateral gutter     The medial and lateral menisci demonstrated tears as above. Using and arthroscopic shaver and biter, the menisci were debrided to stable vertical margins. This was probed and found to be stable throughout including the posterior roots. This completed the arthroscopic partial meniscectomies.     Arthroscopic fluid was withdrawn from the knee and the arthroscope was removed. Anesthetic was injected. The portals were  closed with buried 3-0 Monocryl.  Tourniquet was deflated. Steri strips and a sterile dressing were applied. The patient was then awoken from anesthesia without complication and taken to PACU in stable condition.    Complications: None; patient tolerated the procedure well.    Post-operative plan:    The patient will be weightbearing as tolerated with no bracing needed and will be maintained on aspirin 81 mg daily for 2 weeks. Ambulate with crutches until no longer limping. Pain control. Patient will start PT next week and will follow up in 1 week for wound check.     Submitted by:  Leo Saucedo - 2/22/2024 - 10:00 AM

## 2024-02-22 NOTE — DISCHARGE SUMMARY
[facility] - Periop Services  Discharge Note  Short Stay    Procedure(s) (LRB):  ARTHROSCOPY, KNEE, WITH MENISCECTOMY (Right)      OUTCOME: Patient tolerated treatment/procedure well without complication and is now ready for discharge.    DISPOSITION: Home or Self Care    FINAL DIAGNOSIS:  Medial and lateral meniscal tears    FOLLOWUP: In clinic    DISCHARGE INSTRUCTIONS:    Discharge Procedure Orders   Diet general   Order Comments: As prior to surgery     Keep surgical extremity elevated     Ice to affected area     Lifting restrictions     No driving, operating heavy equipment or signing legal documents while taking pain medication     Other restrictions (specify):   Order Comments: Weight Bearing:   ROM:     Wound care routine (specify)   Order Comments: Wound care routine: Keep dressing C/D/I.  May reinforce or change if becomes loose or soiled.  Ok to remove on POD 3.  Keep steri strips in place.     Call MD for:  temperature >100.4     Call MD for:  persistent nausea and vomiting     Call MD for:  severe uncontrolled pain     Call MD for:  difficulty breathing, headache or visual disturbances     Call MD for:  redness, tenderness, or signs of infection (pain, swelling, redness, odor or green/yellow discharge around incision site)     Call MD for:  hives     Call MD for:  persistent dizziness or light-headedness     Call MD for:  extreme fatigue     Activity as tolerated        TIME SPENT ON DISCHARGE: 10 minutes

## 2024-02-22 NOTE — ANESTHESIA PROCEDURE NOTES
Intubation    Date/Time: 2/22/2024 9:04 AM    Performed by: Eduardo Valentin CRNA  Authorized by: Eduardo Valentin CRNA    Intubation:     Induction:  Intravenous    Intubated:  Postinduction    Mask Ventilation:  Easy with oral airway    Attempts:  1    Attempted By:  CRNA    Difficult Airway Encountered?: No      Complications:  None    Airway Device:  Supraglottic airway/LMA    Airway Device Size:  3.0    Style/Cuff Inflation:  Cuffed    Secured at:  The lips    Placement Verified By:  Capnometry    Complicating Factors:  None

## 2024-02-22 NOTE — DISCHARGE INSTRUCTIONS
Postoperative Instructions         DIET    Begin with clear liquids and light foods (jello, soups, etc.)    Progress to your normal diet if you are not nauseated    WOUND CARE    Maintain your operative dressing  clean and dry. Loosen bandage if swelling of the toes occur. Elevate extremity  to the level of the heart to help reduce swelling    It is normal for there to be bleeding and swelling following surgery. Reinforce with additional dressing as needed.    Change dressing in 72 hours. Keep steri strips in place. Cover with dry, clean dressing.    MEDICATIONS    Anesthetic  was injected into the knee and will be temporary. Patients commonly encounter more pain on the first or second day after surgery when swelling peaks    Most patients will require some narcotic pain medication for a short period of time - this can be taken as directed on the bottle    Common side effects of pain medication are nausea, drowsiness, and constipation. To decrease the side effects take the medication with food. We recommend a stool softener such as Colace (docusate) available over the counter and be sure to drink plenty of water.    If you are having problems with nausea and vomiting, contact the office to possibly have your medications changed    Do not drive a car or operate machinery while taking the narcotic medication    Please avoid alcohol use while taking narcotic pain medication    If you are having pain that is not being controlled by the pain medication prescribed, you may take an over the counter anti-inflammatory medication such as ibuprofen (600 - 800mg) or naproxen in between doses of pain medication. This will help to decrease pain and decrease the amount of narcotic medication required. Please take as directed on the bottle.    ACTIVITY    Elevate the operative extremity to chest level whenever possible to decrease swelling    Weightbearing as tolerated ambulation with crutches until no longer limping    Start PT in  3-4 days postoperatively    EMERGENCIES    Contact Dr. Robins office at [phone] if any of the following are present:    ·         Painful swelling or numbness (note that some swelling and numbness is normal)    ·         Unrelenting pain or calf pain    ·         Fever (over 101F - it is normal to have a low grade fever (<100F) for the first day or two following surgery) or chills    ·         Redness around incisions    ·         Color change of toes    ·         Continuous drainage or bleeding from incision (a small amount of drainage is expected)    ·         Difficulty breathing    ·         Excessive nausea/vomiting    If you have an emergency that requires immediate attention proceed to the nearest emergency room    FOLLOW UP    Your first follow up will be in one week.         If you have any further questions please contact Dr. Robins office

## 2024-02-22 NOTE — ANESTHESIA PREPROCEDURE EVALUATION
02/22/2024  Julianne Parker is a 64 y.o., female with ----------------------------  Bradycardia  Essential (primary) hypertension  Hypothyroidism, unspecified  Menopause  Microscopic hematuria  Nontoxic single thyroid nodule  Sleep disorder not due to a substance or known physiological   condition, unspecified    And ----------------------------  No past surgeries    Presents for right knee arthroscopy      Pre-op Assessment    I have reviewed the NPO Status.      Review of Systems  Cardiovascular:     Hypertension                                  Hypertension         Neurological:      Headaches      Dx of Headaches                           Endocrine:   Hypothyroidism       Hypothyroidism              Physical Exam  General: Well nourished, Cooperative, Alert and Oriented    Airway:  Mallampati: II   Mouth Opening: Normal  TM Distance: Normal  Tongue: Normal  Neck ROM: Normal ROM    Dental:  Intact    Chest/Lungs:  Clear to auscultation, Normal Respiratory Rate    Heart:  Rate: Normal  Rhythm: Regular Rhythm        Anesthesia Plan  Type of Anesthesia, risks & benefits discussed:    Anesthesia Type: Gen Supraglottic Airway  Intra-op Monitoring Plan: Standard ASA Monitors  Post Op Pain Control Plan: multimodal analgesia and IV/PO Opioids PRN  Induction:  IV  Airway Plan: Direct, Post-Induction  Informed Consent: Informed consent signed with the Patient and all parties understand the risks and agree with anesthesia plan.  All questions answered.   ASA Score: 2  Day of Surgery Review of History & Physical: H&P Update referred to the surgeon/provider.  Anesthesia Plan Notes: Premedication: Midazolam  Special Technique: Preemptive analgesia with neurontin, zofran, acetaminophen and celebrex or tramadol  LMA with OG tube  PONV prophylaxis    Ready For Surgery From Anesthesia Perspective.     .

## 2024-02-22 NOTE — TRANSFER OF CARE
"Anesthesia Transfer of Care Note    Patient: Julianne Parker    Procedure(s) Performed: Procedure(s) (LRB):  ARTHROSCOPY, KNEE, WITH MENISCECTOMY (Right)    Patient location: PACU    Anesthesia Type: general    Transport from OR: Transported from OR on room air with adequate spontaneous ventilation    Post pain: adequate analgesia    Post assessment: no apparent anesthetic complications    Post vital signs: stable    Level of consciousness: sedated    Nausea/Vomiting: no nausea/vomiting    Complications: none    Transfer of care protocol was followed      Last vitals: Visit Vitals  BP (!) 115/58   Pulse (!) 54   Temp 35.9 °C (96.6 °F)   Resp 18   Ht 5' 7" (1.702 m)   Wt 69.2 kg (152 lb 8.9 oz)   SpO2 100%   Breastfeeding No   BMI 23.89 kg/m²     "

## 2024-02-22 NOTE — ANESTHESIA POSTPROCEDURE EVALUATION
Anesthesia Post Evaluation    Patient: Julianne aPrker    Procedure(s) Performed: Procedure(s) (LRB):  ARTHROSCOPY, KNEE, WITH MENISCECTOMY (Right)    Final Anesthesia Type: general      Patient location during evaluation: PACU  Patient participation: Yes- Able to Participate  Level of consciousness: awake and alert  Post-procedure vital signs: reviewed and stable  Pain management: adequate  Airway patency: patent    PONV status at discharge: No PONV  Anesthetic complications: no      Cardiovascular status: hemodynamically stable  Respiratory status: unassisted  Hydration status: euvolemic  Follow-up not needed.              Vitals Value Taken Time   /61 02/22/24 1031   Temp 36.4 °C (97.5 °F) 02/22/24 1000   Pulse 52 02/22/24 1034   Resp 14 02/22/24 1033   SpO2 97 % 02/22/24 1034   Vitals shown include unvalidated device data.      No case tracking events are documented in the log.      Pain/Linnea Score: Pain Rating Prior to Med Admin: 0 (2/22/2024  6:52 AM)  Linnea Score: 9 (2/22/2024 10:32 AM)

## 2024-02-23 ENCOUNTER — TELEPHONE (OUTPATIENT)
Dept: ORTHOPEDICS | Facility: CLINIC | Age: 64
End: 2024-02-23
Payer: MEDICARE

## 2024-02-23 VITALS
HEIGHT: 67 IN | WEIGHT: 152.56 LBS | BODY MASS INDEX: 23.94 KG/M2 | DIASTOLIC BLOOD PRESSURE: 65 MMHG | RESPIRATION RATE: 16 BRPM | TEMPERATURE: 98 F | HEART RATE: 50 BPM | SYSTOLIC BLOOD PRESSURE: 140 MMHG | OXYGEN SATURATION: 98 %

## 2024-02-23 NOTE — TELEPHONE ENCOUNTER
Patient called asking if Dr. Saucedo or Azalia his NP could give her a call. She had some questions regarding her medications, which I answered, but she also had questions about her surgery and did not relay what those questions were.She requested a call back. Will send message to Dr. Saucedo and Azalia

## 2024-02-27 ENCOUNTER — TELEPHONE (OUTPATIENT)
Dept: ORTHOPEDICS | Facility: CLINIC | Age: 64
End: 2024-02-27
Payer: MEDICARE

## 2024-02-27 NOTE — TELEPHONE ENCOUNTER
Patient left voice message asking for a call back . She had questions for Dr Moon's nurse she stated. I attempted call back but patient did not answer and I was unable to leave a voicemail.

## 2024-02-28 ENCOUNTER — OFFICE VISIT (OUTPATIENT)
Dept: ORTHOPEDICS | Facility: CLINIC | Age: 64
End: 2024-02-28
Payer: MEDICARE

## 2024-02-28 VITALS
DIASTOLIC BLOOD PRESSURE: 68 MMHG | BODY MASS INDEX: 23.86 KG/M2 | SYSTOLIC BLOOD PRESSURE: 113 MMHG | HEIGHT: 67 IN | WEIGHT: 152 LBS | HEART RATE: 52 BPM

## 2024-02-28 DIAGNOSIS — Z87.828 S/P ARTHROSCOPIC PARTIAL LATERAL MENISCECTOMY OF RIGHT KNEE: ICD-10-CM

## 2024-02-28 DIAGNOSIS — Z98.890 S/P ARTHROSCOPIC PARTIAL LATERAL MENISCECTOMY OF RIGHT KNEE: ICD-10-CM

## 2024-02-28 DIAGNOSIS — Z98.890 S/P ARTHROSCOPIC PARTIAL MEDIAL MENISCECTOMY OF RIGHT KNEE: Primary | ICD-10-CM

## 2024-02-28 DIAGNOSIS — Z87.828 S/P ARTHROSCOPIC PARTIAL MEDIAL MENISCECTOMY OF RIGHT KNEE: Primary | ICD-10-CM

## 2024-02-28 PROCEDURE — 3074F SYST BP LT 130 MM HG: CPT | Mod: CPTII,,, | Performed by: REHABILITATION UNIT

## 2024-02-28 PROCEDURE — 1159F MED LIST DOCD IN RCRD: CPT | Mod: CPTII,,, | Performed by: REHABILITATION UNIT

## 2024-02-28 PROCEDURE — 99024 POSTOP FOLLOW-UP VISIT: CPT | Mod: ,,, | Performed by: REHABILITATION UNIT

## 2024-02-28 PROCEDURE — 4010F ACE/ARB THERAPY RXD/TAKEN: CPT | Mod: CPTII,,, | Performed by: REHABILITATION UNIT

## 2024-02-28 PROCEDURE — 1125F AMNT PAIN NOTED PAIN PRSNT: CPT | Mod: CPTII,,, | Performed by: REHABILITATION UNIT

## 2024-02-28 PROCEDURE — 3078F DIAST BP <80 MM HG: CPT | Mod: CPTII,,, | Performed by: REHABILITATION UNIT

## 2024-02-28 NOTE — PROGRESS NOTES
Subjective:      Patient ID: Julianne Parker is a 64 y.o. female.    Chief Complaint: Post-op Evaluation of the Right Knee (Post-op for right knee menisectomy. Knee is doing ok. Having a lot of pain and swelling. Mobility is good. Pt states no issues besides pain and swelling.)    Date of procedure: 2/22/24     Procedure:  Right knee arthroscopy and partial Medial and Lateral meniscectomies       Julianne Parker returns to the clinic today for post-operative examination. Patient is s/p the above procedure. Pain is controlled with medications. Doing well postoperatively.  Denies any wound issues.  No fevers, chills, or night sweats. Patient ambulating without device.  No sensory or motor changes reported.  Patient has not started physical therapy.  Compliant with aspirin. No other complaints at this time.       Comprehensive review of systems completed and negative except as per HPI.  Objective:     Vitals:    02/28/24 0813   BP: 113/68   Pulse: (!) 52     Gen: No acute distress    Right knee:  Portal sites healing well without signs of infection or dehiscence. Steri strips in place. Small effusion and mild swelling normal for postoperative timeframe.    Range of motion 2 to 110 degrees. Able to perform straight leg raise    Well-perfused lower extremity with capillary refill less than 2 seconds  Sensation intact to light touch to tibial nerve, superficial and deep peroneal distributions.  5 out of 5 EHL/FHL, tibials anterior, and gastrocsoleus complex  Calf soft and easily compressible without clinical sign of DVT. No palpable popliteal lymphadenopathy.  No pain out of proportion to expectation. No excessive pain with passive stretch of muscles in any compartment. Temperature and color of extremity appropriate. Brisk capillary refill of digits. Compartments compressible without evidence of excessive firmness.     Imaging:  None    Assessment:       Encounter Diagnoses   Name Primary?    S/P arthroscopic  partial medial meniscectomy of right knee Yes    S/P arthroscopic partial lateral meniscectomy of right knee          Plan:       Julianne was seen today for post-op evaluation.    Diagnoses and all orders for this visit:    S/P arthroscopic partial medial meniscectomy of right knee    S/P arthroscopic partial lateral meniscectomy of right knee    Surgery and arthroscopic images reviewed   Status post the above-stated procedure and doing well.   Pain medications as needed. Ice encouraged. Risk of medications were discussed.   Continue anticoagulation.   Start PT. Compliance with protocol and home exercises encouraged.   WBAT   All of the patient's questions and concerns were addressed during the visit. Patient will call or contact our office with any new issues or concerns.    Follow-up: Julianne Parker to follow up in 6 weeks. If there are any questions prior to this, the patient was instructed to contact the office.

## 2024-02-29 ENCOUNTER — TELEPHONE (OUTPATIENT)
Dept: ORTHOPEDICS | Facility: CLINIC | Age: 64
End: 2024-02-29
Payer: MEDICARE

## 2024-02-29 NOTE — TELEPHONE ENCOUNTER
Patient left two voice messages requesting a call back. Attempted to give patient a call back. There was no answer and unable to leave a voice message.

## 2024-02-29 NOTE — TELEPHONE ENCOUNTER
Patient called back, needing clarification on medications. I did lt me know not to take any other NSAIDS while she's still taking the prescribed aspirin. Once her aspirin is finished she can continue to take her meloxicam and voltaren. Patient had no further questions or concerns.

## 2024-03-14 ENCOUNTER — TELEPHONE (OUTPATIENT)
Dept: ORTHOPEDICS | Facility: CLINIC | Age: 64
End: 2024-03-14
Payer: MEDICARE

## 2024-03-14 ENCOUNTER — LAB VISIT (OUTPATIENT)
Dept: LAB | Facility: HOSPITAL | Age: 64
End: 2024-03-14
Attending: NURSE PRACTITIONER
Payer: MEDICARE

## 2024-03-14 DIAGNOSIS — E78.00 HIGH CHOLESTEROL: ICD-10-CM

## 2024-03-14 DIAGNOSIS — Z00.00 WELLNESS EXAMINATION: ICD-10-CM

## 2024-03-14 DIAGNOSIS — Z13.1 SCREENING FOR DIABETES MELLITUS: ICD-10-CM

## 2024-03-14 DIAGNOSIS — E03.9 HYPOTHYROIDISM, UNSPECIFIED TYPE: ICD-10-CM

## 2024-03-14 LAB
ALBUMIN SERPL-MCNC: 3.6 G/DL (ref 3.4–4.8)
ALBUMIN/GLOB SERPL: 1.1 RATIO (ref 1.1–2)
ALP SERPL-CCNC: 75 UNIT/L (ref 40–150)
ALT SERPL-CCNC: 11 UNIT/L (ref 0–55)
APPEARANCE UR: CLEAR
AST SERPL-CCNC: 17 UNIT/L (ref 5–34)
BACTERIA #/AREA URNS AUTO: ABNORMAL /HPF
BILIRUB SERPL-MCNC: 0.2 MG/DL
BILIRUB UR QL STRIP.AUTO: NEGATIVE
BUN SERPL-MCNC: 9.9 MG/DL (ref 9.8–20.1)
CALCIUM SERPL-MCNC: 9.1 MG/DL (ref 8.4–10.2)
CHLORIDE SERPL-SCNC: 110 MMOL/L (ref 98–107)
CHOLEST SERPL-MCNC: 191 MG/DL
CHOLEST/HDLC SERPL: 3 {RATIO} (ref 0–5)
CO2 SERPL-SCNC: 25 MMOL/L (ref 23–31)
COLOR UR AUTO: ABNORMAL
CREAT SERPL-MCNC: 0.72 MG/DL (ref 0.55–1.02)
EST. AVERAGE GLUCOSE BLD GHB EST-MCNC: 114 MG/DL
GFR SERPLBLD CREATININE-BSD FMLA CKD-EPI: >60 MLS/MIN/1.73/M2
GLOBULIN SER-MCNC: 3.3 GM/DL (ref 2.4–3.5)
GLUCOSE SERPL-MCNC: 78 MG/DL (ref 82–115)
GLUCOSE UR QL STRIP.AUTO: NORMAL
HBA1C MFR BLD: 5.6 %
HDLC SERPL-MCNC: 64 MG/DL (ref 35–60)
HYALINE CASTS #/AREA URNS LPF: ABNORMAL /LPF
KETONES UR QL STRIP.AUTO: NEGATIVE
LDLC SERPL CALC-MCNC: 118 MG/DL (ref 50–140)
LEUKOCYTE ESTERASE UR QL STRIP.AUTO: NEGATIVE
MUCOUS THREADS URNS QL MICRO: ABNORMAL /LPF
NITRITE UR QL STRIP.AUTO: NEGATIVE
PH UR STRIP.AUTO: 5 [PH]
POTASSIUM SERPL-SCNC: 4 MMOL/L (ref 3.5–5.1)
PROT SERPL-MCNC: 6.9 GM/DL (ref 5.8–7.6)
PROT UR QL STRIP.AUTO: NEGATIVE
RBC #/AREA URNS AUTO: ABNORMAL /HPF
RBC UR QL AUTO: ABNORMAL
SODIUM SERPL-SCNC: 143 MMOL/L (ref 136–145)
SP GR UR STRIP.AUTO: 1.02 (ref 1–1.03)
SQUAMOUS #/AREA URNS LPF: ABNORMAL /HPF
TRIGL SERPL-MCNC: 43 MG/DL (ref 37–140)
TSH SERPL-ACNC: 1.5 UIU/ML (ref 0.35–4.94)
UROBILINOGEN UR STRIP-ACNC: NORMAL
VLDLC SERPL CALC-MCNC: 9 MG/DL
WBC #/AREA URNS AUTO: ABNORMAL /HPF

## 2024-03-14 PROCEDURE — 80053 COMPREHEN METABOLIC PANEL: CPT

## 2024-03-14 PROCEDURE — 36415 COLL VENOUS BLD VENIPUNCTURE: CPT

## 2024-03-14 PROCEDURE — 81001 URINALYSIS AUTO W/SCOPE: CPT

## 2024-03-14 PROCEDURE — 80061 LIPID PANEL: CPT

## 2024-03-14 PROCEDURE — 84443 ASSAY THYROID STIM HORMONE: CPT

## 2024-03-14 PROCEDURE — 83036 HEMOGLOBIN GLYCOSYLATED A1C: CPT

## 2024-03-14 NOTE — TELEPHONE ENCOUNTER
Returned patient's phone call. She ws making sure it was okay to take her meloxicam now that she was finished with her aspirin. I let her know that it was in fact okay to take the mobic. She had no other questions and stated that her recovery was going well with no problems.

## 2024-03-18 ENCOUNTER — OFFICE VISIT (OUTPATIENT)
Dept: INTERNAL MEDICINE | Facility: CLINIC | Age: 64
End: 2024-03-18
Payer: MEDICARE

## 2024-03-18 VITALS
RESPIRATION RATE: 18 BRPM | WEIGHT: 151 LBS | BODY MASS INDEX: 23.7 KG/M2 | DIASTOLIC BLOOD PRESSURE: 73 MMHG | TEMPERATURE: 98 F | HEIGHT: 67 IN | HEART RATE: 58 BPM | SYSTOLIC BLOOD PRESSURE: 130 MMHG

## 2024-03-18 DIAGNOSIS — Z12.31 BREAST CANCER SCREENING BY MAMMOGRAM: ICD-10-CM

## 2024-03-18 DIAGNOSIS — R00.1 BRADYCARDIA: ICD-10-CM

## 2024-03-18 DIAGNOSIS — E78.00 HIGH CHOLESTEROL: ICD-10-CM

## 2024-03-18 DIAGNOSIS — E03.9 HYPOTHYROIDISM, UNSPECIFIED TYPE: ICD-10-CM

## 2024-03-18 DIAGNOSIS — R31.29 MICROSCOPIC HEMATURIA: ICD-10-CM

## 2024-03-18 DIAGNOSIS — Z00.00 WELL ADULT EXAM: ICD-10-CM

## 2024-03-18 DIAGNOSIS — I10 PRIMARY HYPERTENSION: Primary | ICD-10-CM

## 2024-03-18 DIAGNOSIS — E03.9 ACQUIRED HYPOTHYROIDISM: ICD-10-CM

## 2024-03-18 DIAGNOSIS — Z12.11 COLON CANCER SCREENING: ICD-10-CM

## 2024-03-18 DIAGNOSIS — M25.561 CHRONIC PAIN OF RIGHT KNEE: ICD-10-CM

## 2024-03-18 DIAGNOSIS — E04.1 THYROID NODULE: ICD-10-CM

## 2024-03-18 DIAGNOSIS — G89.29 CHRONIC PAIN OF RIGHT KNEE: ICD-10-CM

## 2024-03-18 PROCEDURE — 4010F ACE/ARB THERAPY RXD/TAKEN: CPT | Mod: CPTII,,, | Performed by: NURSE PRACTITIONER

## 2024-03-18 PROCEDURE — 3044F HG A1C LEVEL LT 7.0%: CPT | Mod: CPTII,,, | Performed by: NURSE PRACTITIONER

## 2024-03-18 PROCEDURE — 3075F SYST BP GE 130 - 139MM HG: CPT | Mod: CPTII,,, | Performed by: NURSE PRACTITIONER

## 2024-03-18 PROCEDURE — 3288F FALL RISK ASSESSMENT DOCD: CPT | Mod: CPTII,,, | Performed by: NURSE PRACTITIONER

## 2024-03-18 PROCEDURE — 3078F DIAST BP <80 MM HG: CPT | Mod: CPTII,,, | Performed by: NURSE PRACTITIONER

## 2024-03-18 PROCEDURE — 3008F BODY MASS INDEX DOCD: CPT | Mod: CPTII,,, | Performed by: NURSE PRACTITIONER

## 2024-03-18 PROCEDURE — 1101F PT FALLS ASSESS-DOCD LE1/YR: CPT | Mod: CPTII,,, | Performed by: NURSE PRACTITIONER

## 2024-03-18 PROCEDURE — 99214 OFFICE O/P EST MOD 30 MIN: CPT | Mod: S$PBB,,, | Performed by: NURSE PRACTITIONER

## 2024-03-18 PROCEDURE — 1126F AMNT PAIN NOTED NONE PRSNT: CPT | Mod: CPTII,,, | Performed by: NURSE PRACTITIONER

## 2024-03-18 PROCEDURE — 99215 OFFICE O/P EST HI 40 MIN: CPT | Mod: PBBFAC | Performed by: NURSE PRACTITIONER

## 2024-03-18 PROCEDURE — 1159F MED LIST DOCD IN RCRD: CPT | Mod: CPTII,,, | Performed by: NURSE PRACTITIONER

## 2024-03-18 RX ORDER — LEVOTHYROXINE SODIUM 75 UG/1
75 TABLET ORAL
Qty: 90 TABLET | Refills: 1 | Status: SHIPPED | OUTPATIENT
Start: 2024-03-18 | End: 2024-06-18 | Stop reason: SDUPTHER

## 2024-03-18 RX ORDER — LISINOPRIL 20 MG/1
20 TABLET ORAL
COMMUNITY
Start: 2023-10-12 | End: 2024-03-18

## 2024-03-18 NOTE — PROGRESS NOTES
Patient ID: [id number]     Chief Complaint: Establish Care        HPI:     HPI      Julianne Parker is a 64 y.o. female here today to establish care. Pt was previously followed by Mikey Biggs NP.            Immunizations:   Immunization History   Administered Date(s) Administered    COVID-19 MRNA, LN-S PF (MODERNA HALF 0.25 ML DOSE) 11/17/2021    COVID-19 Vaccine 03/11/2021, 04/08/2021    COVID-19, MRNA, LN-S, PF (MODERNA FULL 0.5 ML DOSE) 03/11/2021, 04/08/2021    Td (ADULT) 02/19/1992, 05/01/2007    Tdap 12/30/2020        ----------------------------  Bradycardia  Essential (primary) hypertension  Hypothyroidism, unspecified  Menopause  Microscopic hematuria  Nontoxic single thyroid nodule  Sleep disorder not due to a substance or known physiological   condition, unspecified     Past Surgical History:   Procedure Laterality Date    KNEE ARTHROSCOPY W/ MENISCECTOMY Right 2/22/2024    Procedure: ARTHROSCOPY, KNEE, WITH MENISCECTOMY;  Surgeon: Leo Saucedo MD;  Location: [facility];  Service: Orthopedics;  Laterality: Right;    NO PAST SURGERIES         Review of patient's allergies indicates:   Allergen Reactions    Hydrochlorothiazide      Other reaction(s): rash  Rash    Hydrocortisone Other (See Comments)    Lisinopril Edema    Sulfamethoxazole-trimethoprim Other (See Comments)    Hydrobenzthiazide Rash       Current Outpatient Medications   Medication Instructions    amLODIPine (NORVASC) 10 mg, Oral, Daily    aspirin (ECOTRIN) 81 mg, Oral, Daily    blood pressure monitor (BLOOD PRESSURE KIT) Kit Use daily to assess blood pressure    ceramides 1,3,6-II (CERAVE) Crea Apply as needed    levothyroxine (SYNTHROID) 75 mcg, Oral, Before breakfast    losartan (COZAAR) 50 mg, Oral, Daily    vitamin D (VITAMIN D3) 2,000 Units, Oral, Daily       Social History     Socioeconomic History    Marital status:      Spouse name: Manas    Number of children: 2   Tobacco Use    Smoking status: Never     Passive  exposure: Past    Smokeless tobacco: Never   Substance and Sexual Activity    Alcohol use: Never    Drug use: Never    Sexual activity: Yes     Partners: Male     Social Determinants of Health     Financial Resource Strain: Low Risk  (2/8/2023)    Overall Financial Resource Strain (CARDIA)     Difficulty of Paying Living Expenses: Not hard at all   Food Insecurity: No Food Insecurity (2/8/2023)    Hunger Vital Sign     Worried About Running Out of Food in the Last Year: Never true     Ran Out of Food in the Last Year: Never true   Transportation Needs: No Transportation Needs (2/8/2023)    PRAPARE - Transportation     Lack of Transportation (Medical): No     Lack of Transportation (Non-Medical): No   Physical Activity: Insufficiently Active (2/8/2023)    Exercise Vital Sign     Days of Exercise per Week: 2 days     Minutes of Exercise per Session: 40 min   Stress: No Stress Concern Present (2/8/2023)    [employer] - Occupational Stress Questionnaire     Feeling of Stress : Not at all   Social Connections: Socially Integrated (2/8/2023)    Social Connection and Isolation Panel [NHANES]     Frequency of Communication with Friends and Family: Twice a week     Frequency of Social Gatherings with Friends and Family: Once a week     Attends [employer] Services: More than 4 times per year     Active Member of Clubs or Organizations: Yes     Attends Club or Organization Meetings: More than 4 times per year     Marital Status:    Housing Stability: Low Risk  (2/8/2023)    Housing Stability Vital Sign     Unable to Pay for Housing in the Last Year: No     Number of Places Lived in the Last Year: 1     Unstable Housing in the Last Year: No        Family History   Problem Relation Age of Onset    Diabetes type II Mother     Hypertension Mother     Hypertension Father         Patient Care Team:  Poonam Appiah FNP as PCP - General (Family Medicine)     Subjective:     Review of Systems  "    See HPI for details    Constitutional: Denies Change in appetite. Denies Chills. Denies Fever. Denies Night sweats.  Eye: Denies Blurred vision. Denies Discharge. Denies Eye pain.  ENT: Denies Decreased hearing. Denies Sore throat. Denies Swollen glands.  Respiratory: Denies Cough. Denies Shortness of breath. Denies Shortness of breath with exertion. Denies Wheezing.  Cardiovascular: DeniesChest pain at rest. Denies Chest pain with exertion. Denies Irregular heartbeat. Denies Palpitations. Denies Edema.  Gastrointestinal: Denies Abdominal pain. DeniesDiarrhea. Denies Nausea. Denies Vomiting. Denies Hematemesis or Hematochezia.  Genitourinary: Denies Dysuria. Denies Urinary frequency. Denies Urinary urgency. Denies Blood in urine.  Endocrine: Denies Cold intolerance. Denies Excessive thirst. Denies Heat intolerance. Denies Weight loss. Denies Weight gain.  Musculoskeletal: Denies Painful joints. Denies Weakness.  Integumentary: Denies Rash. Denies Itching. Denies Dry skin.  Neurologic: Denies Dizziness. Denies Fainting. Denies Headache.  Psychiatric: Denies Depression. Denies Anxiety. Denies Suicidal/Homicidal ideations.    All Other ROS: Negative except as stated in HPI.       Objective:     Visit Vitals  /73 (BP Location: Right arm, Patient Position: Sitting, BP Method: Large (Automatic))   Pulse (!) 58   Temp 97.7 °F (36.5 °C) (Oral)   Resp 18   Ht 5' 7" (1.702 m)   Wt 68.5 kg (151 lb)   BMI 23.65 kg/m²       Physical Exam    General: Alert and oriented, No acute distress.  Head: Normocephalic, Atraumatic.  Eye: Pupils are equal, round and reactive to light, Extraocular movements are intact, Sclera non-icteric.  Ears/Nose/Throat: Normal, Mucosa moist,Clear.  Neck/Thyroid: Supple, Non-tender, No carotid bruit, No lymphadenopathy, No JVD, Full range of motion.  Respiratory: Clear to auscultation bilaterally; No wheezes, rales or rhonchi,Non-labored respirations, Symmetrical chest wall " expansion.  Cardiovascular: Regular rate and rhythm, S1/S2 normal, No murmurs, rubs or gallops.  Gastrointestinal: Soft, Non-tender, Non-distended, Normal bowel sounds, No palpable organomegaly.  Musculoskeletal: Normal range of motion.  Integumentary: Warm, Dry, Intact, No suspicious lesions or rashes.  Extremities: No clubbing, cyanosis or edema  Neurologic: No focal deficits, Cranial Nerves II-XII are grossly intact, Motor strength normal upper and lower extremities, Sensory exam intact.  Psychiatric: Normal interaction, Coherent speech, Euthymic mood, Appropriate affect       Labs Reviewed:     Chemistry:  Lab Results   Component Value Date     03/14/2024    K 4.0 03/14/2024    CHLORIDE 110 (H) 03/14/2024    BUN 9.9 03/14/2024    CREATININE 0.72 03/14/2024    EGFRNORACEVR >60 03/14/2024    GLUCOSE 78 (L) 03/14/2024    CALCIUM 9.1 03/14/2024    ALKPHOS 75 03/14/2024    LABPROT 6.9 03/14/2024    ALBUMIN 3.6 03/14/2024    BILIDIR 0.2 12/03/2021    IBILI 0.20 12/03/2021    AST 17 03/14/2024    ALT 11 03/14/2024    MG 2.20 12/11/2022    CRZVTQQC08OW 57.3 08/30/2023        Lab Results   Component Value Date    HGBA1C 5.6 03/14/2024        Hematology:  Lab Results   Component Value Date    WBC 2.62 (L) 02/15/2024    HGB 11.8 (L) 02/15/2024    HCT 35.8 (L) 02/15/2024     02/15/2024       Lipid Panel:  Lab Results   Component Value Date    CHOL 191 03/14/2024    HDL 64 (H) 03/14/2024    .00 03/14/2024    TRIG 43 03/14/2024    TOTALCHOLEST 3 03/14/2024        Urine:  Lab Results   Component Value Date    COLORUA Light-Yellow 03/14/2024    APPEARANCEUA Clear 03/14/2024    SGUA 1.019 03/14/2024    PHUA 5.0 03/14/2024    PROTEINUA Negative 03/14/2024    GLUCOSEUA Normal 03/14/2024    KETONESUA Negative 03/14/2024    BLOODUA 1+ (A) 03/14/2024    NITRITESUA Negative 03/14/2024    LEUKOCYTESUR Negative 03/14/2024    RBCUA 0-5 03/14/2024    WBCUA 0-5 03/14/2024    BACTERIA None Seen 03/14/2024    SQEPUA  Moderate (A) 03/14/2024    HYALINECASTS None Seen 03/14/2024        Assessment:       ICD-10-CM ICD-9-CM   1. Primary hypertension  I10 401.9   2. Acquired hypothyroidism  E03.9 244.9   3. Bradycardia  R00.1 427.89   4. Microscopic hematuria  R31.29 599.72   5. High cholesterol  E78.00 272.0   6. Thyroid nodule  E04.1 241.0   7. Chronic pain of right knee  M25.561 719.46    G89.29 338.29   8. Breast cancer screening by mammogram  Z12.31 V76.12   9. Well adult exam  Z00.00 V70.0   10. Colon cancer screening  Z12.11 V76.51   11. Hypothyroidism, unspecified type  E03.9 244.9        Plan:     1. Primary hypertension  BP controlled. Low fat low salt diet and exercise. Cont Amlodipine as prescribed.     2. Acquired hypothyroidism  TSH WNL. Cont Levothyroxine as prescribed.     3. Bradycardia  Pt followed by [facility]. Keep appts.     4. Microscopic hematuria  Pt asymptomatic. UA in 3 months.     5. High cholesterol  FLP WNL. Low fat diet and exercise.     6. Thyroid nodule  Thyroid US done 10-31-22:  US Thyroid  Order: [id number]  Status: Final result       Visible to patient: No (inaccessible in Patient Portal)       Next appt: 04/10/2024 at 09:00 AM in Orthopedics (Leo Saucedo MD)       Dx: Thyroid nodule    0 Result Notes       1 Follow-up Encounter  Details    Reading Physician Reading Date Result Priority   Shola Waldron MD  [phone] 10/31/2022 Routine     Narrative & Impression  EXAMINATION:  US THYROID     CLINICAL HISTORY:  , Nontoxic single thyroid nodule.     TECHNIQUE:  Multiple transverse and sagittal grayscale sonographic images were acquired through the thyroid gland.     FINDINGS:  Right hemithyroid measures 3.4 x 1.4 x 1.6, left joel thyroid measures 3.5 x 1.4 x 1.2 isthmus measures 3.1 mm in thickness.     Within the right joel thyroid there is a 7 mm x 6 mm x 7 mm subcentimeter nodule and within the left joel thyroid there is a 2 mm x 1 mm x 2 mm subcentimeter nodule  none of these nodules meets criteria for biopsy and or surveillance.     No other focal abnormalities identified     Impression:     Subcentimeter nodules in the right and left joel thyroid as above        Electronically signed by: Shola Waldron  Date:                                            10/31/2022  Time:                                           14:18           Exam Ended: 10/31/22 10:45 CDT Last Resulted: 10/31/22 14:18 CDT             7. Chronic pain of right knee  Pt followed in Ortho cl. Keep appts. Cont Meloxicam as prescribed prn pain.     8. Breast cancer screening by mammogram  MMG with [facility] in 1 month.     9. Well adult exam  Labs in 3 months. Refer to GYN cl for annual exam. MMG with [facility] in 1 month. Pt denies hx of colonoscopy. [facility].          Follow up in about 3 months (around 6/18/2024) for with labs 1 week prior to appt. . In addition to their scheduled follow up, the patient has also been instructed to follow up on as needed basis.     Future Appointments   Date Time Provider Department Center   4/10/2024  9:00 AM Leo Saucedo MD [facility] [place] Tina M Boudreaux, FNP

## 2024-04-04 ENCOUNTER — HOSPITAL ENCOUNTER (OUTPATIENT)
Dept: RADIOLOGY | Facility: HOSPITAL | Age: 64
Discharge: HOME | End: 2024-04-04
Attending: NURSE PRACTITIONER
Payer: MEDICARE

## 2024-04-04 DIAGNOSIS — Z12.31 BREAST CANCER SCREENING BY MAMMOGRAM: ICD-10-CM

## 2024-04-04 PROCEDURE — 77063 BREAST TOMOSYNTHESIS BI: CPT | Mod: TC

## 2024-04-04 PROCEDURE — 77063 BREAST TOMOSYNTHESIS BI: CPT | Mod: 26,,, | Performed by: RADIOLOGY

## 2024-04-04 PROCEDURE — 77067 SCR MAMMO BI INCL CAD: CPT | Mod: 26,,, | Performed by: RADIOLOGY

## 2024-04-19 ENCOUNTER — TELEPHONE (OUTPATIENT)
Dept: INTERNAL MEDICINE | Facility: CLINIC | Age: 64
End: 2024-04-19
Payer: MEDICARE

## 2024-04-22 ENCOUNTER — OFFICE VISIT (OUTPATIENT)
Dept: ORTHOPEDICS | Facility: CLINIC | Age: 64
End: 2024-04-22
Payer: MEDICARE

## 2024-04-22 ENCOUNTER — HOSPITAL ENCOUNTER (EMERGENCY)
Facility: HOSPITAL | Age: 64
Discharge: HOME | End: 2024-04-22
Attending: EMERGENCY MEDICINE
Payer: MEDICARE

## 2024-04-22 VITALS
WEIGHT: 151 LBS | DIASTOLIC BLOOD PRESSURE: 73 MMHG | HEART RATE: 50 BPM | BODY MASS INDEX: 23.7 KG/M2 | HEIGHT: 67 IN | SYSTOLIC BLOOD PRESSURE: 139 MMHG

## 2024-04-22 VITALS
TEMPERATURE: 98 F | BODY MASS INDEX: 22.25 KG/M2 | HEIGHT: 68 IN | HEART RATE: 54 BPM | OXYGEN SATURATION: 100 % | SYSTOLIC BLOOD PRESSURE: 151 MMHG | RESPIRATION RATE: 16 BRPM | DIASTOLIC BLOOD PRESSURE: 61 MMHG | WEIGHT: 146.81 LBS

## 2024-04-22 DIAGNOSIS — Z98.890 S/P ARTHROSCOPIC PARTIAL LATERAL MENISCECTOMY OF RIGHT KNEE: ICD-10-CM

## 2024-04-22 DIAGNOSIS — R21 RASH AND NONSPECIFIC SKIN ERUPTION: Primary | ICD-10-CM

## 2024-04-22 DIAGNOSIS — Z98.890 S/P ARTHROSCOPIC PARTIAL MEDIAL MENISCECTOMY OF RIGHT KNEE: Primary | ICD-10-CM

## 2024-04-22 DIAGNOSIS — Z87.828 S/P ARTHROSCOPIC PARTIAL MEDIAL MENISCECTOMY OF RIGHT KNEE: Primary | ICD-10-CM

## 2024-04-22 DIAGNOSIS — Z87.828 S/P ARTHROSCOPIC PARTIAL LATERAL MENISCECTOMY OF RIGHT KNEE: ICD-10-CM

## 2024-04-22 PROCEDURE — 99281 EMR DPT VST MAYX REQ PHY/QHP: CPT

## 2024-04-22 PROCEDURE — 3044F HG A1C LEVEL LT 7.0%: CPT | Mod: CPTII,,, | Performed by: REHABILITATION UNIT

## 2024-04-22 PROCEDURE — 4010F ACE/ARB THERAPY RXD/TAKEN: CPT | Mod: CPTII,,, | Performed by: REHABILITATION UNIT

## 2024-04-22 PROCEDURE — 3075F SYST BP GE 130 - 139MM HG: CPT | Mod: CPTII,,, | Performed by: REHABILITATION UNIT

## 2024-04-22 PROCEDURE — 3078F DIAST BP <80 MM HG: CPT | Mod: CPTII,,, | Performed by: REHABILITATION UNIT

## 2024-04-22 PROCEDURE — 99024 POSTOP FOLLOW-UP VISIT: CPT | Mod: ,,, | Performed by: REHABILITATION UNIT

## 2024-04-22 RX ORDER — MELOXICAM 15 MG/1
15 TABLET ORAL DAILY
Qty: 30 TABLET | Refills: 2 | Status: SHIPPED | OUTPATIENT
Start: 2024-04-22

## 2024-04-22 NOTE — ED PROVIDER NOTES
Encounter Date: 4/22/2024       History     Chief Complaint   Patient presents with    Rash     Itchy rash to right arm for a few days. No distress. Reports low heart rate is normal for her.      The patient presents with rash.  The onset was 4 days ago.  The course/duration of symptoms is constant.  Location: right antecubital area. The character of symptoms is itching.  Radiating symptom: none.  The degree of symptoms is minimal.  Risk factors consist of none  Prior episodes: occasional.  Associated symptoms: denies fever, denies chills and denies shortness of breath.             Review of patient's allergies indicates:   Allergen Reactions    Hydrochlorothiazide      Other reaction(s): rash  Rash    Hydrocortisone Other (See Comments)    Lisinopril Edema    Sulfamethoxazole-trimethoprim Other (See Comments)    Hydrobenzthiazide Rash     Past Medical History:   Diagnosis Date    Bradycardia     Essential (primary) hypertension     Hypothyroidism, unspecified     Menopause     Microscopic hematuria     Nontoxic single thyroid nodule     Sleep disorder not due to a substance or known physiological condition, unspecified      Past Surgical History:   Procedure Laterality Date    KNEE ARTHROSCOPY W/ MENISCECTOMY Right 2/22/2024    Procedure: ARTHROSCOPY, KNEE, WITH MENISCECTOMY;  Surgeon: Leo Saucedo MD;  Location: [facility];  Service: Orthopedics;  Laterality: Right;    NO PAST SURGERIES       Family History   Problem Relation Name Age of Onset    Diabetes type II Mother      Hypertension Mother      Hypertension Father       Social History     Tobacco Use    Smoking status: Never     Passive exposure: Past    Smokeless tobacco: Never   Substance Use Topics    Alcohol use: Never    Drug use: Never     Review of Systems   Constitutional:  Negative for fever.   HENT:  Negative for sore throat.    Respiratory:  Negative for shortness of breath.    Cardiovascular:  Negative for chest pain.   Gastrointestinal:   Negative for nausea.   Genitourinary:  Negative for dysuria.   Musculoskeletal:  Negative for back pain.   Skin:  Positive for rash.   Neurological:  Negative for weakness.   Hematological:  Does not bruise/bleed easily.   All other systems reviewed and are negative.      Physical Exam     Initial Vitals [04/22/24 0929]   BP Pulse Resp Temp SpO2   (!) 150/76 (!) 47 16 98 °F (36.7 °C) 100 %      MAP       --         Physical Exam    Nursing note and vitals reviewed.  Constitutional: She appears well-developed and well-nourished.   HENT:   Head: Normocephalic and atraumatic.   Neck: Neck supple.   Normal range of motion.  Cardiovascular:  Normal rate, regular rhythm, normal heart sounds and intact distal pulses.           Pulmonary/Chest: Effort normal and breath sounds normal.   Abdominal: Abdomen is soft and flat. Bowel sounds are normal. There is no abdominal tenderness.   Musculoskeletal:         General: Normal range of motion.      Cervical back: Normal range of motion and neck supple.     Neurological: She is alert. She has normal strength.   Skin: Skin is warm and dry.   mild maculopapular rash to her right antecubital area   Psychiatric: She has a normal mood and affect.         ED Course   Procedures  Labs Reviewed - No data to display       Imaging Results    None          Medications - No data to display  Medical Decision Making  The patient presents with rash.  The onset was 4 days ago.  The course/duration of symptoms is constant.  Location: right antecubital area. The character of symptoms is itching.  Radiating symptom: none.  The degree of symptoms is minimal.  Risk factors consist of none  Prior episodes: occasional.  Associated symptoms: denies fever, denies chills and denies shortness of breath.      The patient has an unopened tube of triamcinolone cream with her that was prescribed a couple months ago for a rash on her leg. She didn't know it was appropriate to use on the rash on her right arm.  She will start using it and follow up with her pcp. 9:45 AM DISPOSITION: The patient is resting comfortably in no acute distress.  She is hemodynamically stable and is without objective evidence for acute process requiring urgent intervention or hospitalization. I provided counseling to patient with regard to condition, the treatment plan, specific conditions for return, and the importance of follow up. Detailed written and verbal instructions provided to patient and she expressed a verbal understanding of the discharge instructions and overall management plan. Reiterated the importance of medication administration and safety and advised patient to follow up with primary care provider in 3-5 days or sooner if needed.  Answered questions at this time. The patient is stable for discharge.                                         Clinical Impression:  Final diagnoses:  [R21] Rash and nonspecific skin eruption (Primary)          ED Disposition Condition    Discharge Stable          ED Prescriptions    None       Follow-up Information       Follow up With Specialties Details Why Contact Info    Poonam Appiah, FNP Family Medicine In 3 days  [address] [facility] [address] [facility] [address]  [phone]      [facility] Emergency Medicine  If symptoms worsen [address] [place] [id number] [phone]             Bentley Moreau, ACNP  04/22/24 0948

## 2024-04-22 NOTE — PROGRESS NOTES
Subjective:      Patient ID: Julianne Parker is a 64 y.o. female.    Chief Complaint: Follow-up (2 mth R knee menisectomy sx 2/22/24 states pain comes and goes. Some swelling/stiff when she tries to bend. Has tried to attend a few PT sessions. )    Date of procedure: 2/22/24     Procedure:  Right knee arthroscopy and partial Medial and Lateral meniscectomies       Julianne Parker returns to the clinic today for post-operative examination. Patient is s/p the above procedure.  She reports minimal pain and is doing much better than she was preoperatively.  She does have some persistent swelling which is mild.  Ambulating without device.  No sensory or motor changes distally.  No mechanical symptoms and no instability.  She has not been too much physical therapy due to financial constraints.  She was provided with some home exercises but also reports not doing these consistently.  She is pleased with her progress.     Comprehensive review of systems completed and negative except as per HPI.  Objective:     Vitals:    04/22/24 0856   BP: 139/73   Pulse: (!) 50       Gen: No acute distress    Right knee:  Portal sites healing well without signs of infection or dehiscence.Small effusion and mild swelling normal for postoperative timeframe.    Range of motion 0 to 125 degrees. Able to perform straight leg raise    Negative Surinder's  Well-perfused lower extremity with capillary refill less than 2 seconds  Sensation intact to light touch to tibial nerve, superficial and deep peroneal distributions.  5 out of 5 EHL/FHL, tibials anterior, and gastrocsoleus complex  Calf soft and easily compressible without clinical sign of DVT. No palpable popliteal lymphadenopathy.  No pain out of proportion to expectation. No excessive pain with passive stretch of muscles in any compartment. Temperature and color of extremity appropriate. Brisk capillary refill of digits. Compartments compressible without evidence of excessive  firmness.     Imaging:  None    Assessment:       Encounter Diagnoses   Name Primary?    S/P arthroscopic partial medial meniscectomy of right knee Yes    S/P arthroscopic partial lateral meniscectomy of right knee            Plan:       Julianne was seen today for follow-up.    Diagnoses and all orders for this visit:    S/P arthroscopic partial medial meniscectomy of right knee    S/P arthroscopic partial lateral meniscectomy of right knee      Status post the above-stated procedure and doing well.   Pain medications as needed. Ice encouraged. Risk of medications were discussed.   We are going to try physical therapy at a different location to see if it seems more feasible.  If not she is going to start me diligent with home exercises.  She does have a small amount of swelling on her knee which is to be expected given the degenerative changes in her knee.  She may benefit from an injection in the future. Mobic refilled today.  WBAT   All of the patient's questions and concerns were addressed during the visit. Patient will call or contact our office with any new issues or concerns.    Follow-up: Julianne Parker to follow up in 6-8 weeks. If there are any questions prior to this, the patient was instructed to contact the office.

## 2024-05-23 ENCOUNTER — HOSPITAL ENCOUNTER (EMERGENCY)
Facility: HOSPITAL | Age: 64
Discharge: HOME | End: 2024-05-23
Attending: EMERGENCY MEDICINE
Payer: MEDICARE

## 2024-05-23 VITALS
WEIGHT: 147.5 LBS | BODY MASS INDEX: 22.43 KG/M2 | DIASTOLIC BLOOD PRESSURE: 65 MMHG | SYSTOLIC BLOOD PRESSURE: 157 MMHG | TEMPERATURE: 98 F | HEART RATE: 43 BPM | RESPIRATION RATE: 10 BRPM | OXYGEN SATURATION: 98 %

## 2024-05-23 DIAGNOSIS — R53.1 WEAKNESS: ICD-10-CM

## 2024-05-23 DIAGNOSIS — R42 LIGHTHEADEDNESS: Primary | ICD-10-CM

## 2024-05-23 LAB
ANION GAP SERPL CALC-SCNC: 7 MEQ/L
BACTERIA #/AREA URNS AUTO: ABNORMAL /HPF
BASOPHILS # BLD AUTO: 0.02 X10(3)/MCL
BASOPHILS NFR BLD AUTO: 0.8 %
BILIRUB UR QL STRIP.AUTO: NEGATIVE
BNP BLD-MCNC: 29.6 PG/ML
BUN SERPL-MCNC: 11.6 MG/DL (ref 9.8–20.1)
CALCIUM SERPL-MCNC: 9.3 MG/DL (ref 8.4–10.2)
CHLORIDE SERPL-SCNC: 110 MMOL/L (ref 98–107)
CLARITY UR: CLEAR
CO2 SERPL-SCNC: 23 MMOL/L (ref 23–31)
COLOR UR AUTO: YELLOW
CREAT SERPL-MCNC: 0.73 MG/DL (ref 0.55–1.02)
CREAT/UREA NIT SERPL: 16
EOSINOPHIL # BLD AUTO: 0.1 X10(3)/MCL (ref 0–0.9)
EOSINOPHIL NFR BLD AUTO: 3.9 %
ERYTHROCYTE [DISTWIDTH] IN BLOOD BY AUTOMATED COUNT: 13.3 % (ref 11.5–17)
GFR SERPLBLD CREATININE-BSD FMLA CKD-EPI: >60 ML/MIN/1.73/M2
GLUCOSE SERPL-MCNC: 88 MG/DL (ref 82–115)
GLUCOSE UR QL STRIP: NORMAL
HCT VFR BLD AUTO: 33.7 % (ref 37–47)
HGB BLD-MCNC: 11.4 G/DL (ref 12–16)
HGB UR QL STRIP: ABNORMAL
HOLD SPECIMEN: NORMAL
HYALINE CASTS #/AREA URNS LPF: ABNORMAL /LPF
IMM GRANULOCYTES # BLD AUTO: 0 X10(3)/MCL (ref 0–0.04)
IMM GRANULOCYTES NFR BLD AUTO: 0 %
KETONES UR QL STRIP: NEGATIVE
LEUKOCYTE ESTERASE UR QL STRIP: NEGATIVE
LYMPHOCYTES # BLD AUTO: 0.94 X10(3)/MCL (ref 0.6–4.6)
LYMPHOCYTES NFR BLD AUTO: 36.3 %
MAGNESIUM SERPL-MCNC: 2.2 MG/DL (ref 1.6–2.6)
MCH RBC QN AUTO: 29.5 PG (ref 27–31)
MCHC RBC AUTO-ENTMCNC: 33.8 G/DL (ref 33–36)
MCV RBC AUTO: 87.1 FL (ref 80–94)
MONOCYTES # BLD AUTO: 0.27 X10(3)/MCL (ref 0.1–1.3)
MONOCYTES NFR BLD AUTO: 10.4 %
MUCOUS THREADS URNS QL MICRO: ABNORMAL /LPF
NEUTROPHILS # BLD AUTO: 1.26 X10(3)/MCL (ref 2.1–9.2)
NEUTROPHILS NFR BLD AUTO: 48.6 %
NITRITE UR QL STRIP: NEGATIVE
NRBC BLD AUTO-RTO: 0 %
PH UR STRIP: 5.5 [PH]
PLATELET # BLD AUTO: 255 X10(3)/MCL (ref 130–400)
PMV BLD AUTO: 8.7 FL (ref 7.4–10.4)
POCT GLUCOSE: 84 MG/DL (ref 70–110)
POTASSIUM SERPL-SCNC: 3.4 MMOL/L (ref 3.5–5.1)
PROT UR QL STRIP: ABNORMAL
RBC # BLD AUTO: 3.87 X10(6)/MCL (ref 4.2–5.4)
RBC #/AREA URNS AUTO: ABNORMAL /HPF
SODIUM SERPL-SCNC: 140 MMOL/L (ref 136–145)
SP GR UR STRIP.AUTO: 1.03 (ref 1–1.03)
SQUAMOUS #/AREA URNS LPF: ABNORMAL /HPF
TROPONIN I SERPL-MCNC: <0.01 NG/ML (ref 0–0.04)
UROBILINOGEN UR STRIP-ACNC: NORMAL
WBC # SPEC AUTO: 2.59 X10(3)/MCL (ref 4.5–11.5)
WBC #/AREA URNS AUTO: ABNORMAL /HPF

## 2024-05-23 PROCEDURE — 93005 ELECTROCARDIOGRAM TRACING: CPT

## 2024-05-23 PROCEDURE — 96360 HYDRATION IV INFUSION INIT: CPT

## 2024-05-23 PROCEDURE — 84484 ASSAY OF TROPONIN QUANT: CPT | Performed by: EMERGENCY MEDICINE

## 2024-05-23 PROCEDURE — 80048 BASIC METABOLIC PNL TOTAL CA: CPT | Performed by: EMERGENCY MEDICINE

## 2024-05-23 PROCEDURE — 83735 ASSAY OF MAGNESIUM: CPT | Performed by: EMERGENCY MEDICINE

## 2024-05-23 PROCEDURE — 85025 COMPLETE CBC W/AUTO DIFF WBC: CPT | Performed by: EMERGENCY MEDICINE

## 2024-05-23 PROCEDURE — 82962 GLUCOSE BLOOD TEST: CPT

## 2024-05-23 PROCEDURE — 83880 ASSAY OF NATRIURETIC PEPTIDE: CPT | Performed by: EMERGENCY MEDICINE

## 2024-05-23 PROCEDURE — 99284 EMERGENCY DEPT VISIT MOD MDM: CPT | Mod: 25

## 2024-05-23 PROCEDURE — 81001 URINALYSIS AUTO W/SCOPE: CPT | Performed by: EMERGENCY MEDICINE

## 2024-05-23 PROCEDURE — 25000003 PHARM REV CODE 250: Performed by: EMERGENCY MEDICINE

## 2024-05-23 RX ADMIN — SODIUM CHLORIDE 1000 ML: 9 INJECTION, SOLUTION INTRAVENOUS at 08:05

## 2024-05-23 NOTE — ED PROVIDER NOTES
ED PROVIDER NOTE  5/23/2024    CHIEF COMPLAINT:   Chief Complaint   Patient presents with    Dizziness     Pt w co feeling lightheaded since yesterday. Worse upon standing.  Denies CP/SOB.  CBG  84.  PT HR 50, HX OF BRADYCARDIA, EKG OBTAINED.        HISTORY OF PRESENT ILLNESS:   Julianne Parker is a 64 y.o. female who presents with chief complaint Lightheadedness.  Onset was yesterday whenever she began having episodes of lightheadedness that would come on with standing up and would resolve with sitting down.  Associated symptoms of having some slight nausea with it.  Denies chest pain, palpitations, shortness of breath, vomiting, diarrhea.  Denies any new medications within the past week.    The history is provided by the patient.         REVIEW OF SYSTEMS: as noted in the HPI.  NURSING NOTES REVIEWED      PAST MEDICAL/SURGICAL HISTORY:   Past Medical History:   Diagnosis Date    Bradycardia     Essential (primary) hypertension     Hypothyroidism, unspecified     Menopause     Microscopic hematuria     Nontoxic single thyroid nodule     Sleep disorder not due to a substance or known physiological condition, unspecified       Past Surgical History:   Procedure Laterality Date    KNEE ARTHROSCOPY W/ MENISCECTOMY Right 2/22/2024    Procedure: ARTHROSCOPY, KNEE, WITH MENISCECTOMY;  Surgeon: Leo Saucedo MD;  Location: [facility];  Service: Orthopedics;  Laterality: Right;    NO PAST SURGERIES         FAMILY HISTORY:   Family History   Problem Relation Name Age of Onset    Diabetes type II Mother      Hypertension Mother      Hypertension Father         SOCIAL HISTORY:   Social History     Tobacco Use    Smoking status: Never     Passive exposure: Past    Smokeless tobacco: Never   Substance Use Topics    Alcohol use: Never    Drug use: Never       ALLERGIES:   Review of patient's allergies indicates:   Allergen Reactions    Hydrochlorothiazide      Other reaction(s): rash  Rash    Hydrocortisone Other (See  Comments)    Lisinopril Edema    Sulfamethoxazole-trimethoprim Other (See Comments)    Hydrobenzthiazide Rash       PHYSICAL EXAM:  Initial Vitals [05/23/24 0721]   BP Pulse Resp Temp SpO2   (!) 145/65 (!) 50 16 98.1 °F (36.7 °C) 100 %      MAP       --         Physical Exam    Nursing note and vitals reviewed.  Constitutional: She appears well-developed and well-nourished.   HENT:   Head: Normocephalic and atraumatic.   Mouth/Throat: Uvula is midline and mucous membranes are normal.   Eyes: EOM are normal. Pupils are equal, round, and reactive to light.   Neck: Trachea normal. Neck supple.   Cardiovascular:  Regular rhythm and normal pulses.   Bradycardia present.         Pulmonary/Chest: Effort normal and breath sounds normal.   Abdominal: Abdomen is soft. Bowel sounds are normal. There is no rebound and no guarding.   Musculoskeletal:         General: Normal range of motion.      Cervical back: Neck supple.     Neurological: She is alert and oriented to person, place, and time. GCS eye subscore is 4. GCS verbal subscore is 5. GCS motor subscore is 6.   Skin: Skin is warm and dry.   Psychiatric: She has a normal mood and affect. Her speech is normal. Thought content normal.         RESULTS:  Labs Reviewed   BASIC METABOLIC PANEL - Abnormal; Notable for the following components:       Result Value    Potassium 3.4 (*)     Chloride 110 (*)     All other components within normal limits   CBC WITH DIFFERENTIAL - Abnormal; Notable for the following components:    WBC 2.59 (*)     RBC 3.87 (*)     Hgb 11.4 (*)     Hct 33.7 (*)     Neut # 1.26 (*)     All other components within normal limits   B-TYPE NATRIURETIC PEPTIDE - Normal   MAGNESIUM - Normal   TROPONIN I - Normal   CBC W/ AUTO DIFFERENTIAL    Narrative:     The following orders were created for panel order CBC auto differential.  Procedure                               Abnormality         Status                     ---------                                -----------         ------                     CBC with Differential[[id number]]       Abnormal            Final result                 Please view results for these tests on the individual orders.   EXTRA TUBES    Narrative:     The following orders were created for panel order EXTRA TUBES.  Procedure                               Abnormality         Status                     ---------                               -----------         ------                     Light Blue Top Hold[[id number]]                             Final result               Gold Top Hold[[id number]]                                   Final result               Pink Top Hold[[id number]]                                   Final result                 Please view results for these tests on the individual orders.   LIGHT BLUE TOP HOLD   GOLD TOP HOLD   PINK TOP HOLD   URINALYSIS, REFLEX TO URINE CULTURE   POCT GLUCOSE     Imaging Results    None         PROCEDURES:  Procedures    ECG:  EKG Readings: (Independently Interpreted)   Initial Reading: No STEMI. Rhythm: Sinus Bradycardia. Heart Rate: 51. Ectopy: No Ectopy. Conduction: Normal. Axis: Normal.       ED COURSE AND MEDICAL DECISION MAKING:  Medications   sodium chloride 0.9% bolus 1,000 mL 1,000 mL (1,000 mLs Intravenous New Bag 5/23/24 0818)     ED Course as of 05/23/24 0909   Thu May 23, 2024   0800 WBC(!): 2.59  Not significantly changed from 3 months ago. [IB]   0800 Hemoglobin(!): 11.4 [IB]   0800 Platelet Count: 255 [IB]   0815 BUN: 11.6 [IB]   0815 Creatinine: 0.73 [IB]   0815 Magnesium : 2.20 [IB]      ED Course User Index  [IB] Sam Mccall, DO        Medical Decision Making  64-year-old female who presents with lightheadedness with standing since yesterday.  Differential diagnosis includes symptomatic bradycardia, orthostatic hypotension, ACS.  ECG shows sinus bradycardia, no STEMI.  CBC in his significantly changed from previous labs.  BMP grossly unremarkable.  Troponin negative.   Orthostatic vitals negative.  When patient stands up her heart rate increased from 40s at rest to 68 and denied having any lightheadedness what I had her perform this maneuver.  Instructed to follow up with her PCP.  Given strict ED return precautions. I have spoken with the patient and/or caregivers. I have explained the patient's condition, diagnoses and treatment plan based on the information available to me at this time. I have answered the patient's and/or caregiver's questions and addressed any concerns. The patient and/or caregivers have as good an understanding of the patient's diagnosis, condition and treatment plan as can be expected at this point. The vital signs have been stable. The patient's condition is stable and appropriate for discharge from the emergency department.     The patient will pursue further outpatient evaluation with the primary care physician or other designated or consulting physician as outlined in the discharge instructions. The patient and/or caregivers are agreeable to this plan of care and follow-up instructions have been explained in detail. The patient and/or caregivers have received these instructions in written format and have expressed an understanding of the discharge instructions. The patient and/or caregivers are aware that any significant change in condition or worsening of symptoms should prompt an immediate return to this or the closest emergency department or a call to 911.    Amount and/or Complexity of Data Reviewed  External Data Reviewed: notes.  Labs: ordered. Decision-making details documented in ED Course.  ECG/medicine tests: ordered and independent interpretation performed.    Risk  Prescription drug management.  Decision regarding hospitalization.        CLINICAL IMPRESSION:  1. Lightheadedness    2. Weakness        DISPOSITION:   ED Disposition Condition    Discharge Stable            ED Prescriptions    None       Follow-up Information       Follow up  With Specialties Details Why Contact Info    Poonam Appiah, FNP Family Medicine Schedule an appointment as soon as possible for a visit   [address] [facility] [address]  [phone]      [facility] Dept Emergency Medicine  If symptoms worsen [address] [facility] [id number] [phone]               Sam Mccall,   05/23/24 0909

## 2024-05-24 LAB
OHS QRS DURATION: 90 MS
OHS QTC CALCULATION: 416 MS

## 2024-05-26 ENCOUNTER — HOSPITAL ENCOUNTER (EMERGENCY)
Facility: HOSPITAL | Age: 64
Discharge: HOME | End: 2024-05-26
Attending: EMERGENCY MEDICINE
Payer: MEDICARE

## 2024-05-26 VITALS
SYSTOLIC BLOOD PRESSURE: 145 MMHG | WEIGHT: 149.94 LBS | HEIGHT: 68 IN | TEMPERATURE: 99 F | DIASTOLIC BLOOD PRESSURE: 75 MMHG | BODY MASS INDEX: 22.72 KG/M2 | OXYGEN SATURATION: 100 % | HEART RATE: 57 BPM | RESPIRATION RATE: 16 BRPM

## 2024-05-26 DIAGNOSIS — R53.1 WEAKNESS: ICD-10-CM

## 2024-05-26 DIAGNOSIS — R42 DIZZINESS: Primary | ICD-10-CM

## 2024-05-26 LAB
ALBUMIN SERPL-MCNC: 3.7 G/DL (ref 3.4–4.8)
ALBUMIN/GLOB SERPL: 1.1 RATIO (ref 1.1–2)
ALP SERPL-CCNC: 85 UNIT/L (ref 40–150)
ALT SERPL-CCNC: 16 UNIT/L (ref 0–55)
ANION GAP SERPL CALC-SCNC: 9 MEQ/L
AST SERPL-CCNC: 20 UNIT/L (ref 5–34)
BACTERIA #/AREA URNS AUTO: ABNORMAL /HPF
BASOPHILS # BLD AUTO: 0.02 X10(3)/MCL
BASOPHILS NFR BLD AUTO: 0.6 %
BILIRUB SERPL-MCNC: 0.2 MG/DL
BILIRUB UR QL STRIP.AUTO: NEGATIVE
BUN SERPL-MCNC: 9.5 MG/DL (ref 9.8–20.1)
CALCIUM SERPL-MCNC: 9.4 MG/DL (ref 8.4–10.2)
CHLORIDE SERPL-SCNC: 110 MMOL/L (ref 98–107)
CLARITY UR: CLEAR
CO2 SERPL-SCNC: 23 MMOL/L (ref 23–31)
COLOR UR AUTO: ABNORMAL
CREAT SERPL-MCNC: 0.71 MG/DL (ref 0.55–1.02)
CREAT/UREA NIT SERPL: 13
EOSINOPHIL # BLD AUTO: 0.08 X10(3)/MCL (ref 0–0.9)
EOSINOPHIL NFR BLD AUTO: 2.5 %
ERYTHROCYTE [DISTWIDTH] IN BLOOD BY AUTOMATED COUNT: 13.4 % (ref 11.5–17)
GFR SERPLBLD CREATININE-BSD FMLA CKD-EPI: >60 ML/MIN/1.73/M2
GLOBULIN SER-MCNC: 3.3 GM/DL (ref 2.4–3.5)
GLUCOSE SERPL-MCNC: 89 MG/DL (ref 82–115)
GLUCOSE UR QL STRIP: NORMAL
HCT VFR BLD AUTO: 34.4 % (ref 37–47)
HGB BLD-MCNC: 11.3 G/DL (ref 12–16)
HGB UR QL STRIP: ABNORMAL
HOLD SPECIMEN: NORMAL
HOLD SPECIMEN: NORMAL
HYALINE CASTS #/AREA URNS LPF: ABNORMAL /LPF
IMM GRANULOCYTES # BLD AUTO: 0.01 X10(3)/MCL (ref 0–0.04)
IMM GRANULOCYTES NFR BLD AUTO: 0.3 %
KETONES UR QL STRIP: NEGATIVE
LEUKOCYTE ESTERASE UR QL STRIP: NEGATIVE
LYMPHOCYTES # BLD AUTO: 0.9 X10(3)/MCL (ref 0.6–4.6)
LYMPHOCYTES NFR BLD AUTO: 27.8 %
MAGNESIUM SERPL-MCNC: 2.3 MG/DL (ref 1.6–2.6)
MCH RBC QN AUTO: 29.2 PG (ref 27–31)
MCHC RBC AUTO-ENTMCNC: 32.8 G/DL (ref 33–36)
MCV RBC AUTO: 88.9 FL (ref 80–94)
MONOCYTES # BLD AUTO: 0.26 X10(3)/MCL (ref 0.1–1.3)
MONOCYTES NFR BLD AUTO: 8 %
MUCOUS THREADS URNS QL MICRO: ABNORMAL /LPF
NEUTROPHILS # BLD AUTO: 1.97 X10(3)/MCL (ref 2.1–9.2)
NEUTROPHILS NFR BLD AUTO: 60.8 %
NITRITE UR QL STRIP: NEGATIVE
NRBC BLD AUTO-RTO: 0 %
PH UR STRIP: 5.5 [PH]
PLATELET # BLD AUTO: 248 X10(3)/MCL (ref 130–400)
PMV BLD AUTO: 8.8 FL (ref 7.4–10.4)
POCT GLUCOSE: 103 MG/DL (ref 70–110)
POTASSIUM SERPL-SCNC: 3.9 MMOL/L (ref 3.5–5.1)
PROT SERPL-MCNC: 7 GM/DL (ref 5.8–7.6)
PROT UR QL STRIP: NEGATIVE
RBC # BLD AUTO: 3.87 X10(6)/MCL (ref 4.2–5.4)
RBC #/AREA URNS AUTO: ABNORMAL /HPF
SODIUM SERPL-SCNC: 142 MMOL/L (ref 136–145)
SP GR UR STRIP.AUTO: 1.02 (ref 1–1.03)
SQUAMOUS #/AREA URNS LPF: ABNORMAL /HPF
TROPONIN I SERPL-MCNC: <0.01 NG/ML (ref 0–0.04)
TSH SERPL-ACNC: 1.54 UIU/ML (ref 0.35–4.94)
UROBILINOGEN UR STRIP-ACNC: NORMAL
WBC # SPEC AUTO: 3.24 X10(3)/MCL (ref 4.5–11.5)
WBC #/AREA URNS AUTO: ABNORMAL /HPF

## 2024-05-26 PROCEDURE — 25000003 PHARM REV CODE 250: Performed by: PHYSICIAN ASSISTANT

## 2024-05-26 PROCEDURE — 85025 COMPLETE CBC W/AUTO DIFF WBC: CPT | Performed by: PHYSICIAN ASSISTANT

## 2024-05-26 PROCEDURE — 93005 ELECTROCARDIOGRAM TRACING: CPT

## 2024-05-26 PROCEDURE — 83735 ASSAY OF MAGNESIUM: CPT | Performed by: PHYSICIAN ASSISTANT

## 2024-05-26 PROCEDURE — 84443 ASSAY THYROID STIM HORMONE: CPT | Performed by: PHYSICIAN ASSISTANT

## 2024-05-26 PROCEDURE — 82962 GLUCOSE BLOOD TEST: CPT

## 2024-05-26 PROCEDURE — 80053 COMPREHEN METABOLIC PANEL: CPT | Performed by: PHYSICIAN ASSISTANT

## 2024-05-26 PROCEDURE — 81001 URINALYSIS AUTO W/SCOPE: CPT | Performed by: PHYSICIAN ASSISTANT

## 2024-05-26 PROCEDURE — 84484 ASSAY OF TROPONIN QUANT: CPT | Performed by: PHYSICIAN ASSISTANT

## 2024-05-26 PROCEDURE — 99285 EMERGENCY DEPT VISIT HI MDM: CPT | Mod: 25

## 2024-05-26 RX ORDER — MECLIZINE HYDROCHLORIDE 25 MG/1
25 TABLET ORAL
Status: COMPLETED | OUTPATIENT
Start: 2024-05-26 | End: 2024-05-26

## 2024-05-26 RX ORDER — MECLIZINE HCL 12.5 MG 12.5 MG/1
12.5 TABLET ORAL 3 TIMES DAILY PRN
Qty: 20 TABLET | Refills: 0 | Status: SHIPPED | OUTPATIENT
Start: 2024-05-26

## 2024-05-26 RX ADMIN — MECLIZINE HYDROCHLORIDE 25 MG: 25 TABLET ORAL at 12:05

## 2024-05-26 NOTE — ED PROVIDER NOTES
"Encounter Date: 5/26/2024       History     Chief Complaint   Patient presents with    Dizziness     C/o continued dizziness and headache; seen this past week.      Julianne Parker is a 64 y.o. female with a history of bradycardia, HTN, hypothyroidism who presents to the ED complaining of dizziness and lightheadedness x 1 week. Reports symptoms started after physical therapy last week and has been persistent since. Symptoms occur when she stands from a seated position or turns her head a certain way. Resolve with rest. Describes it as feeling "off balance". She denies vision changes, neck stiffness, chest pain, SOB, N/V/D.    The history is provided by the patient.     Review of patient's allergies indicates:   Allergen Reactions    Hydrochlorothiazide      Other reaction(s): rash  Rash    Hydrocortisone Other (See Comments)    Lisinopril Edema    Sulfamethoxazole-trimethoprim Other (See Comments)    Hydrobenzthiazide Rash     Past Medical History:   Diagnosis Date    Bradycardia     Essential (primary) hypertension     Hypothyroidism, unspecified     Menopause     Microscopic hematuria     Nontoxic single thyroid nodule     Sleep disorder not due to a substance or known physiological condition, unspecified      Past Surgical History:   Procedure Laterality Date    KNEE ARTHROSCOPY W/ MENISCECTOMY Right 2/22/2024    Procedure: ARTHROSCOPY, KNEE, WITH MENISCECTOMY;  Surgeon: Leo Saucedo MD;  Location: [facility];  Service: Orthopedics;  Laterality: Right;    NO PAST SURGERIES       Family History   Problem Relation Name Age of Onset    Diabetes type II Mother      Hypertension Mother      Hypertension Father       Social History     Tobacco Use    Smoking status: Never     Passive exposure: Past    Smokeless tobacco: Never   Substance Use Topics    Alcohol use: Never    Drug use: Never     Review of Systems   Constitutional:  Negative for chills and fever.   HENT:  Negative for congestion and sore throat.  "   Respiratory:  Negative for cough and shortness of breath.    Cardiovascular:  Negative for chest pain and leg swelling.   Gastrointestinal:  Negative for abdominal pain and nausea.   Genitourinary:  Negative for dysuria and frequency.   Musculoskeletal:  Negative for arthralgias and back pain.   Skin:  Negative for rash and wound.   Neurological:  Positive for dizziness and light-headedness. Negative for weakness.   Hematological:  Does not bruise/bleed easily.       Physical Exam     Initial Vitals   BP Pulse Resp Temp SpO2   05/26/24 0811 05/26/24 0811 05/26/24 0813 05/26/24 0811 05/26/24 0811   (!) 146/73 (!) 57 16 98.8 °F (37.1 °C) 100 %      MAP       --                Physical Exam    Nursing note and vitals reviewed.  Constitutional: She appears well-developed and well-nourished. No distress.   HENT:   Head: Normocephalic and atraumatic.   Mouth/Throat: No oropharyngeal exudate.   Eyes: EOM are normal. No scleral icterus.   Neck: Neck supple.   Normal range of motion.  Cardiovascular:  Normal rate and regular rhythm.           No murmur heard.  Pulmonary/Chest: Breath sounds normal. No respiratory distress. She has no wheezes.   Abdominal: Abdomen is soft. Bowel sounds are normal. She exhibits no distension. There is no abdominal tenderness.   Musculoskeletal:         General: No tenderness. Normal range of motion.      Cervical back: Normal range of motion and neck supple.     Neurological: She is alert and oriented to person, place, and time. No cranial nerve deficit.   Skin: Skin is warm and dry. Capillary refill takes less than 2 seconds. No erythema.   Psychiatric: She has a normal mood and affect. Her behavior is normal. Judgment and thought content normal.         ED Course   Procedures  Labs Reviewed   COMPREHENSIVE METABOLIC PANEL - Abnormal; Notable for the following components:       Result Value    Chloride 110 (*)     Blood Urea Nitrogen 9.5 (*)     All other components within normal limits    URINALYSIS, REFLEX TO URINE CULTURE - Abnormal; Notable for the following components:    Blood, UA 1+ (*)     Squamous Epithelial Cells, UA Few (*)     Mucous, UA Trace (*)     Hyaline Casts, UA 0-2 (*)     RBC, UA 6-10 (*)     All other components within normal limits   CBC WITH DIFFERENTIAL - Abnormal; Notable for the following components:    WBC 3.24 (*)     RBC 3.87 (*)     Hgb 11.3 (*)     Hct 34.4 (*)     MCHC 32.8 (*)     Neut # 1.97 (*)     All other components within normal limits   TROPONIN I - Normal   TSH - Normal   MAGNESIUM - Normal   CBC W/ AUTO DIFFERENTIAL    Narrative:     The following orders were created for panel order CBC auto differential.  Procedure                               Abnormality         Status                     ---------                               -----------         ------                     CBC with Differential[[id number]]       Abnormal            Final result                 Please view results for these tests on the individual orders.   EXTRA TUBES    Narrative:     The following orders were created for panel order EXTRA TUBES.  Procedure                               Abnormality         Status                     ---------                               -----------         ------                     Light Blue Top Hold[[id number]]                             Final result               Gold Top Hold[[id number]]                                   Final result                 Please view results for these tests on the individual orders.   LIGHT BLUE TOP HOLD   GOLD TOP HOLD   POCT GLUCOSE   POCT GLUCOSE MONITORING CONTINUOUS        ECG Results              EKG 12-lead (Weakness) Age > 50 (In process)        Collection Time Result Time QRS Duration [facility] QTC Calculation    05/26/24 08:16:03 05/26/24 08:32:29 92 386                     In process by Interface, Lab In [facility] (05/26/24 08:32:32)                   Narrative:    Test Reason : R53.1,    Vent. Rate : 050 BPM      Atrial Rate : 050 BPM     P-R Int : 172 ms          QRS Dur : 092 ms      QT Int : 424 ms       P-R-T Axes : 070 051 048 degrees     QTc Int : 386 ms    Sinus bradycardia  Minimal voltage criteria for LVH, may be normal variant  Borderline Abnormal ECG  When compared with ECG of 23-MAY-2024 07:24,  No significant change was found    Referred By:             Confirmed By:                                   Imaging Results              CT Head Without Contrast (Final result)  Result time 05/26/24 10:46:21      Final result by Steven Hendricks MD (05/26/24 10:46:21)                   Impression:      No acute intracranial process identified.      Electronically signed by: Steven Hendricks  Date:    05/26/2024  Time:    10:46               Narrative:    EXAMINATION:  CT HEAD WITHOUT CONTRAST    CLINICAL HISTORY:  Dizziness, persistent/recurrent, cardiac or vascular cause suspected;    TECHNIQUE:  CT images of the head without IV contrast. Axial, coronal and sagittal images reviewed. Dose length product 977 mGycm. Automatic exposure control, adjustment of mA/kV or iterative reconstruction technique used to limit radiation dose.    COMPARISON:  No relevant comparison studies available at the time of dictation.    FINDINGS:  Extra-axial spaces/ventricular system: Normal for age.    Intracranial hemorrhage: None identified.    Cerebral parenchyma: No acute large vessel territory infarct or mass effect identified.    Vascular system: No hyperdense vessel appreciated.    Cerebellum: Normal.    Sella: Normal.    Included paranasal sinuses and mastoid air cells: Well-aerated.    Visualized orbits: Normal.    Scalp/Calvarium: No depressed skull fracture.                                       Medications   meclizine tablet 25 mg (has no administration in time range)     Medical Decision Making  Differential: BPPV, CVA, thyroid disorder, electrolyte abnormality, among others    ED management: HDS and afebrile. RRR, no murmur. EKG  with NSR, no acute ischemic changes. Troponin negative. Electrolytes WNL. Orthostatic vitals negative. CT without acute hemorrhage or infarct. Suspect BPPV. Will refer to ENT for further evaluation. Meclizine prn. Pt stable for discharge home. ED return precautions given. She verbalized understanding. All questions answered.     Amount and/or Complexity of Data Reviewed  Labs: ordered. Decision-making details documented in ED Course.  Radiology: ordered. Decision-making details documented in ED Course.  ECG/medicine tests:  Decision-making details documented in ED Course.               ED Course as of 05/26/24 1215   Sun May 26, 2024   0933 EKG 12-lead (Weakness) Age > 50  Sinus bradycardia, no acute ischemic changes. [KD]   1046 POCT Glucose: 103 [KD]   1049 CT Head Without Contrast  No acute intracranial process identified. [KD]      ED Course User Index  [KD] Cecille Harris PA-C                             Clinical Impression:  Final diagnoses:  [R53.1] Weakness  [R42] Dizziness (Primary)          ED Disposition Condition    Discharge Stable          ED Prescriptions       Medication Sig Dispense Start Date End Date Auth. Provider    meclizine (ANTIVERT) 12.5 mg tablet Take 1 tablet (12.5 mg total) by mouth 3 (three) times daily as needed for Dizziness. 20 tablet 5/26/2024 -- Cecille Harris PA-C          Follow-up Information       Follow up With Specialties Details Why Contact Info Additional Information    [facility] Emergency Dept Emergency Medicine  If symptoms worsen [address] [facility] [address] [phone]     Poonam Appiah, P Family Medicine In 3 days Hospital follow up Replaced by [facility] [facility] [address]  [phone]       [facility], Entrance 6 Otolaryngology Schedule an appointment as soon as possible for a visit   Replaced by [facility] [facility] [address] [phone] [facility] - ENT,  Entrance #6 Please sign with the   when you arrive.             Cecille Harris PA-C  05/26/24 1673

## 2024-05-27 LAB
OHS QRS DURATION: 92 MS
OHS QTC CALCULATION: 386 MS

## 2024-06-17 ENCOUNTER — OFFICE VISIT (OUTPATIENT)
Dept: ORTHOPEDICS | Facility: CLINIC | Age: 64
End: 2024-06-17
Payer: MEDICARE

## 2024-06-17 VITALS
BODY MASS INDEX: 22.72 KG/M2 | SYSTOLIC BLOOD PRESSURE: 116 MMHG | WEIGHT: 149.94 LBS | HEART RATE: 46 BPM | DIASTOLIC BLOOD PRESSURE: 47 MMHG | HEIGHT: 68 IN

## 2024-06-17 DIAGNOSIS — Z87.828 S/P ARTHROSCOPIC PARTIAL MEDIAL MENISCECTOMY OF RIGHT KNEE: Primary | ICD-10-CM

## 2024-06-17 DIAGNOSIS — Z98.890 S/P ARTHROSCOPIC PARTIAL LATERAL MENISCECTOMY OF RIGHT KNEE: ICD-10-CM

## 2024-06-17 DIAGNOSIS — Z87.828 S/P ARTHROSCOPIC PARTIAL LATERAL MENISCECTOMY OF RIGHT KNEE: ICD-10-CM

## 2024-06-17 DIAGNOSIS — Z98.890 S/P ARTHROSCOPIC PARTIAL MEDIAL MENISCECTOMY OF RIGHT KNEE: Primary | ICD-10-CM

## 2024-06-17 PROBLEM — Z00.00 WELL ADULT EXAM: Status: RESOLVED | Noted: 2024-03-18 | Resolved: 2024-06-17

## 2024-06-17 PROCEDURE — 3044F HG A1C LEVEL LT 7.0%: CPT | Mod: CPTII,,, | Performed by: REHABILITATION UNIT

## 2024-06-17 PROCEDURE — 3078F DIAST BP <80 MM HG: CPT | Mod: CPTII,,, | Performed by: REHABILITATION UNIT

## 2024-06-17 PROCEDURE — 3008F BODY MASS INDEX DOCD: CPT | Mod: CPTII,,, | Performed by: REHABILITATION UNIT

## 2024-06-17 PROCEDURE — 1101F PT FALLS ASSESS-DOCD LE1/YR: CPT | Mod: CPTII,,, | Performed by: REHABILITATION UNIT

## 2024-06-17 PROCEDURE — 3074F SYST BP LT 130 MM HG: CPT | Mod: CPTII,,, | Performed by: REHABILITATION UNIT

## 2024-06-17 PROCEDURE — 1159F MED LIST DOCD IN RCRD: CPT | Mod: CPTII,,, | Performed by: REHABILITATION UNIT

## 2024-06-17 PROCEDURE — 99213 OFFICE O/P EST LOW 20 MIN: CPT | Mod: ,,, | Performed by: REHABILITATION UNIT

## 2024-06-17 PROCEDURE — 3288F FALL RISK ASSESSMENT DOCD: CPT | Mod: CPTII,,, | Performed by: REHABILITATION UNIT

## 2024-06-17 PROCEDURE — 4010F ACE/ARB THERAPY RXD/TAKEN: CPT | Mod: CPTII,,, | Performed by: REHABILITATION UNIT

## 2024-06-17 NOTE — PROGRESS NOTES
Subjective:      Patient ID: Julianne Parker is a 64 y.o. female.    Chief Complaint: Follow-up of the Right Knee (Menisectomy sx 2/22/24- Reports some soreness and swelling in knee. States has a little numbness sensation as well. Is taking mobic which has helped with pain. )    Date of procedure: 2/22/24     Procedure:  Right knee arthroscopy and partial Medial and Lateral meniscectomies       Julianne Parker returns to the clinic today for post-operative examination. Patient is s/p the above procedure.  Overall she is much improved from her preoperative state.  She has some occasional discomfort that responds well to anti-inflammatories.  She has some intermittent swelling.  She completed 8 weeks of physical therapy.  She has been working on some home exercises.  Ambulating without device.  No sensory or motor changes distally.  No mechanical symptoms and no instability. She is pleased with her progress.     Comprehensive review of systems completed and negative except as per HPI.  Objective:     Vitals:    06/17/24 0832   BP: (!) 116/47   Pulse: (!) 46       Gen: No acute distress    Right knee:  Portal sites healing well without signs of infection or dehiscence. Small effusion and mild swelling normal for postoperative timeframe.    Range of motion 0 to 125 degrees. Able to perform straight leg raise    Negative Surinder's  Well-perfused lower extremity with capillary refill less than 2 seconds  Sensation intact to light touch to tibial nerve, superficial and deep peroneal distributions.  5 out of 5 EHL/FHL, tibials anterior, and gastrocsoleus complex  Calf soft and easily compressible without clinical sign of DVT. No palpable popliteal lymphadenopathy.  No pain out of proportion to expectation. No excessive pain with passive stretch of muscles in any compartment. Temperature and color of extremity appropriate. Brisk capillary refill of digits. Compartments compressible without evidence of excessive  firmness.     Imaging:  None    Assessment:       Encounter Diagnoses   Name Primary?    S/P arthroscopic partial medial meniscectomy of right knee Yes    S/P arthroscopic partial lateral meniscectomy of right knee              Plan:       Julinane was seen today for follow-up.    Diagnoses and all orders for this visit:    S/P arthroscopic partial medial meniscectomy of right knee    S/P arthroscopic partial lateral meniscectomy of right knee      Status post the above-stated procedure and doing well.   Pain medications as needed. Ice encouraged. Risk of medications were discussed.   She is going to continue her home exercises for prolonged and sustained knee health  She does have a small amount of swelling on her knee which is to be expected given the degenerative changes in her knee.  She may benefit from an injection in the future.  She is a candidate ready.  Continue Mobic as needed  WBAT   All of the patient's questions and concerns were addressed during the visit. Patient will call or contact our office with any new issues or concerns.    Follow-up: Julianne Parker to follow up as needed

## 2024-06-18 ENCOUNTER — OFFICE VISIT (OUTPATIENT)
Dept: INTERNAL MEDICINE | Facility: CLINIC | Age: 64
End: 2024-06-18
Payer: MEDICARE

## 2024-06-18 VITALS
HEIGHT: 68 IN | TEMPERATURE: 98 F | DIASTOLIC BLOOD PRESSURE: 71 MMHG | RESPIRATION RATE: 18 BRPM | WEIGHT: 150 LBS | HEART RATE: 48 BPM | BODY MASS INDEX: 22.73 KG/M2 | SYSTOLIC BLOOD PRESSURE: 126 MMHG

## 2024-06-18 DIAGNOSIS — Z12.11 COLON CANCER SCREENING: ICD-10-CM

## 2024-06-18 DIAGNOSIS — R00.1 BRADYCARDIA: ICD-10-CM

## 2024-06-18 DIAGNOSIS — R31.29 MICROSCOPIC HEMATURIA: ICD-10-CM

## 2024-06-18 DIAGNOSIS — I10 PRIMARY HYPERTENSION: Primary | ICD-10-CM

## 2024-06-18 DIAGNOSIS — E03.9 ACQUIRED HYPOTHYROIDISM: ICD-10-CM

## 2024-06-18 DIAGNOSIS — E04.1 THYROID NODULE: ICD-10-CM

## 2024-06-18 DIAGNOSIS — Z12.31 BREAST CANCER SCREENING BY MAMMOGRAM: ICD-10-CM

## 2024-06-18 DIAGNOSIS — E78.00 HIGH CHOLESTEROL: ICD-10-CM

## 2024-06-18 DIAGNOSIS — H81.313 AURAL VERTIGO, BILATERAL: ICD-10-CM

## 2024-06-18 DIAGNOSIS — M25.561 CHRONIC PAIN OF RIGHT KNEE: ICD-10-CM

## 2024-06-18 DIAGNOSIS — G89.29 CHRONIC PAIN OF RIGHT KNEE: ICD-10-CM

## 2024-06-18 DIAGNOSIS — R42 VERTIGO: ICD-10-CM

## 2024-06-18 DIAGNOSIS — E03.9 HYPOTHYROIDISM, UNSPECIFIED TYPE: ICD-10-CM

## 2024-06-18 PROCEDURE — 1101F PT FALLS ASSESS-DOCD LE1/YR: CPT | Mod: CPTII,,, | Performed by: NURSE PRACTITIONER

## 2024-06-18 PROCEDURE — 3078F DIAST BP <80 MM HG: CPT | Mod: CPTII,,, | Performed by: NURSE PRACTITIONER

## 2024-06-18 PROCEDURE — 1159F MED LIST DOCD IN RCRD: CPT | Mod: CPTII,,, | Performed by: NURSE PRACTITIONER

## 2024-06-18 PROCEDURE — 4010F ACE/ARB THERAPY RXD/TAKEN: CPT | Mod: CPTII,,, | Performed by: NURSE PRACTITIONER

## 2024-06-18 PROCEDURE — 3044F HG A1C LEVEL LT 7.0%: CPT | Mod: CPTII,,, | Performed by: NURSE PRACTITIONER

## 2024-06-18 PROCEDURE — 99214 OFFICE O/P EST MOD 30 MIN: CPT | Mod: S$PBB,,, | Performed by: NURSE PRACTITIONER

## 2024-06-18 PROCEDURE — 3008F BODY MASS INDEX DOCD: CPT | Mod: CPTII,,, | Performed by: NURSE PRACTITIONER

## 2024-06-18 PROCEDURE — 3288F FALL RISK ASSESSMENT DOCD: CPT | Mod: CPTII,,, | Performed by: NURSE PRACTITIONER

## 2024-06-18 PROCEDURE — 99214 OFFICE O/P EST MOD 30 MIN: CPT | Mod: PBBFAC | Performed by: NURSE PRACTITIONER

## 2024-06-18 PROCEDURE — 3074F SYST BP LT 130 MM HG: CPT | Mod: CPTII,,, | Performed by: NURSE PRACTITIONER

## 2024-06-18 RX ORDER — LEVOTHYROXINE SODIUM 75 UG/1
75 TABLET ORAL
Qty: 90 TABLET | Refills: 1 | Status: SHIPPED | OUTPATIENT
Start: 2024-06-18 | End: 2024-12-15

## 2024-06-18 NOTE — PROGRESS NOTES
Patient ID: [id number]     Chief Complaint: Hypertension        HPI:     HPI      Julianne Parker is a 64 y.o. female here today for a follow up.         Immunizations:   Immunization History   Administered Date(s) Administered    COVID-19 MRNA, LN-S PF (MODERNA HALF 0.25 ML DOSE) 11/17/2021    COVID-19 Vaccine 03/11/2021, 04/08/2021    COVID-19, MRNA, LN-S, PF (MODERNA FULL 0.5 ML DOSE) 03/11/2021, 04/08/2021    Td (ADULT) 02/19/1992, 05/01/2007    Tdap 12/30/2020        -------------------------------------    Bradycardia    Essential (primary) hypertension    Hypothyroidism, unspecified    Menopause    Microscopic hematuria    Nontoxic single thyroid nodule    Sleep disorder not due to a substance or known physiological condition, unspecified        Past Surgical History:   Procedure Laterality Date    KNEE ARTHROSCOPY W/ MENISCECTOMY Right 2/22/2024    Procedure: ARTHROSCOPY, KNEE, WITH MENISCECTOMY;  Surgeon: Leo Saucedo MD;  Location: [facility];  Service: Orthopedics;  Laterality: Right;    NO PAST SURGERIES         Review of patient's allergies indicates:   Allergen Reactions    Hydrochlorothiazide      Other reaction(s): rash  Rash    Hydrocortisone Other (See Comments)    Lisinopril Edema    Sulfamethoxazole-trimethoprim Other (See Comments)    Hydrobenzthiazide Rash       Current Outpatient Medications   Medication Instructions    amLODIPine (NORVASC) 10 mg, Oral, Daily    aspirin (ECOTRIN) 81 mg, Oral, Daily    blood pressure monitor (BLOOD PRESSURE KIT) Kit Use daily to assess blood pressure    ceramides 1,3,6-II (CERAVE) Crea Apply as needed    levothyroxine (SYNTHROID) 75 mcg, Oral, Before breakfast    losartan (COZAAR) 50 mg, Oral, Daily    meclizine (ANTIVERT) 12.5 mg, Oral, 3 times daily PRN    meloxicam (MOBIC) 15 mg, Oral, Daily    vitamin D (VITAMIN D3) 2,000 Units, Oral, Daily       Social History     Socioeconomic History    Marital status:      Spouse name: Manas    Number of  children: 2   Tobacco Use    Smoking status: Never     Passive exposure: Past    Smokeless tobacco: Never   Substance and Sexual Activity    Alcohol use: Never    Drug use: Never    Sexual activity: Yes     Partners: Male   Social History Narrative    ** Merged History Encounter **          Social Determinants of Health     Financial Resource Strain: Low Risk  (2/8/2023)    Overall Financial Resource Strain (CARDIA)     Difficulty of Paying Living Expenses: Not hard at all   Food Insecurity: No Food Insecurity (2/8/2023)    Hunger Vital Sign     Worried About Running Out of Food in the Last Year: Never true     Ran Out of Food in the Last Year: Never true   Transportation Needs: No Transportation Needs (2/8/2023)    PRAPARE - Transportation     Lack of Transportation (Medical): No     Lack of Transportation (Non-Medical): No   Physical Activity: Insufficiently Active (2/8/2023)    Exercise Vital Sign     Days of Exercise per Week: 2 days     Minutes of Exercise per Session: 40 min   Stress: No Stress Concern Present (2/8/2023)    [employer] [place] [employer] - Occupational Stress Questionnaire     Feeling of Stress : Not at all   Housing Stability: Low Risk  (2/8/2023)    Housing Stability Vital Sign     Unable to Pay for Housing in the Last Year: No     Number of Places Lived in the Last Year: 1     Unstable Housing in the Last Year: No        Family History   Problem Relation Name Age of Onset    Diabetes type II Mother      Hypertension Mother      Hypertension Father          Patient Care Team:  Poonam Appiah FNP as PCP - General (Family Medicine)     Subjective:     Review of Systems     See HPI for details    Constitutional: Denies Change in appetite. Denies Chills. Denies Fever. Denies Night sweats.  Eye: Denies Blurred vision. Denies Discharge. Denies Eye pain.  ENT: Denies Decreased hearing. Denies Sore throat. Denies Swollen glands.  Respiratory: Denies Cough. Denies Shortness of breath.  "Denies Shortness of breath with exertion. Denies Wheezing.  Cardiovascular: DeniesChest pain at rest. Denies Chest pain with exertion. Denies Irregular heartbeat. Denies Palpitations. Denies Edema.  Gastrointestinal: Denies Abdominal pain. DeniesDiarrhea. Denies Nausea. Denies Vomiting. Denies Hematemesis or Hematochezia.  Genitourinary: Denies Dysuria. Denies Urinary frequency. Denies Urinary urgency. Denies Blood in urine.  Endocrine: Denies Cold intolerance. Denies Excessive thirst. Denies Heat intolerance. Denies Weight loss. Denies Weight gain.  Musculoskeletal: Denies Painful joints. Denies Weakness.  Integumentary: Denies Rash. Denies Itching. Denies Dry skin.  Neurologic: Denies Dizziness. Denies Fainting. Denies Headache.  Psychiatric: Denies Depression. Denies Anxiety. Denies Suicidal/Homicidal ideations.    All Other ROS: Negative except as stated in HPI.       Objective:     Visit Vitals  /71 (BP Location: Left arm, Patient Position: Sitting, BP Method: Large (Automatic))   Pulse (!) 48   Temp 98.1 °F (36.7 °C) (Oral)   Resp 18   Ht 5' 8" (1.727 m)   Wt 68 kg (150 lb)   BMI 22.81 kg/m²       Physical Exam    General: Alert and oriented, No acute distress.  Head: Normocephalic, Atraumatic.  Eye: Pupils are equal, round and reactive to light, Extraocular movements are intact, Sclera non-icteric.  Ears/Nose/Throat: Normal, Mucosa moist,Clear.  Neck/Thyroid: Supple, Non-tender, No carotid bruit, No lymphadenopathy, No JVD, Full range of motion.  Respiratory: Clear to auscultation bilaterally; No wheezes, rales or rhonchi,Non-labored respirations, Symmetrical chest wall expansion.  Cardiovascular: Regular rate and rhythm, S1/S2 normal, No murmurs, rubs or gallops.  Gastrointestinal: Soft, Non-tender, Non-distended, Normal bowel sounds, No palpable organomegaly.  Musculoskeletal: Normal range of motion.  Integumentary: Warm, Dry, Intact, No suspicious lesions or rashes.  Extremities: No clubbing, " cyanosis or edema  Neurologic: No focal deficits, Cranial Nerves II-XII are grossly intact, Motor strength normal upper and lower extremities, Sensory exam intact.  Psychiatric: Normal interaction, Coherent speech, Euthymic mood, Appropriate affect       Labs Reviewed:     Chemistry:  Lab Results   Component Value Date     05/26/2024    K 3.9 05/26/2024    BUN 9.5 (L) 05/26/2024    CREATININE 0.71 05/26/2024    EGFRNORACEVR >60 05/26/2024    GLUCOSE 89 05/26/2024    CALCIUM 9.4 05/26/2024    ALKPHOS 85 05/26/2024    LABPROT 7.0 05/26/2024    ALBUMIN 3.7 05/26/2024    BILIDIR 0.2 12/03/2021    IBILI 0.20 12/03/2021    AST 20 05/26/2024    ALT 16 05/26/2024    MG 2.30 05/26/2024    VABFIQCV41FK 57.3 08/30/2023        Lab Results   Component Value Date    HGBA1C 5.6 03/14/2024        Hematology:  Lab Results   Component Value Date    WBC 3.24 (L) 05/26/2024    HGB 11.3 (L) 05/26/2024    HCT 34.4 (L) 05/26/2024     05/26/2024       Lipid Panel:  Lab Results   Component Value Date    CHOL 191 03/14/2024    HDL 64 (H) 03/14/2024    .00 03/14/2024    TRIG 43 03/14/2024    TOTALCHOLEST 3 03/14/2024        Urine:  Lab Results   Component Value Date    APPEARANCEUA Clear 05/26/2024    SGUA 1.018 05/26/2024    PROTEINUA Negative 05/26/2024    KETONESUA Negative 05/26/2024    LEUKOCYTESUR Negative 05/26/2024    RBCUA 6-10 (A) 05/26/2024    WBCUA 0-5 05/26/2024    BACTERIA None Seen 05/26/2024    SQEPUA Few (A) 05/26/2024    HYALINECASTS 0-2 (A) 05/26/2024        Assessment:       ICD-10-CM ICD-9-CM   1. Primary hypertension  I10 401.9   2. Acquired hypothyroidism  E03.9 244.9   3. Bradycardia  R00.1 427.89   4. Microscopic hematuria  R31.29 599.72   5. High cholesterol  E78.00 272.0   6. Thyroid nodule  E04.1 241.0   7. Chronic pain of right knee  M25.561 719.46    G89.29 338.29   8. Breast cancer screening by mammogram  Z12.31 V76.12   9. Colon cancer screening  Z12.11 V76.51   10. Vertigo  R42 780.4         Plan:     1. Primary hypertension  Low fat low salt diet and exercise. Cont Amlodipine as prescribed.     2. Acquired hypothyroidism  TSH WNL. Cont Levothyroxine as prescribed.     3. Bradycardia  Pt followed by [facility]. Keep appts.     4. Microscopic hematuria  Pt asymptomatic. UA, C&S cyto in 3 months.     5. High cholesterol  FLP WNL. Low fat diet and exercise.     6. Thyroid nodule  Thyroid US done 10-31-22:  US Thyroid  Order: [id number]  Status: Final result       Visible to patient: No (inaccessible in Patient Portal)       Next appt: 04/10/2024 at 09:00 AM in Orthopedics (Leo Saucedo MD)       Dx: Thyroid nodule    0 Result Notes       1 Follow-up Encounter  Details     Reading Physician Reading Date Result Priority   Shola Waldron MD  [phone] 10/31/2022 Routine      Narrative & Impression  EXAMINATION:  US THYROID     CLINICAL HISTORY:  , Nontoxic single thyroid nodule.     TECHNIQUE:  Multiple transverse and sagittal grayscale sonographic images were acquired through the thyroid gland.     FINDINGS:  Right hemithyroid measures 3.4 x 1.4 x 1.6, left joel thyroid measures 3.5 x 1.4 x 1.2 isthmus measures 3.1 mm in thickness.     Within the right joel thyroid there is a 7 mm x 6 mm x 7 mm subcentimeter nodule and within the left joel thyroid there is a 2 mm x 1 mm x 2 mm subcentimeter nodule none of these nodules meets criteria for biopsy and or surveillance.     No other focal abnormalities identified     Impression:     Subcentimeter nodules in the right and left joel thyroid as above        Electronically signed by: Shola Waldron  Date:                                            10/31/2022  Time:                                           14:18             Exam Ended: 10/31/22 10:45 CDT Last Resulted: 10/31/22 14:18 CDT           Sub-centimeter nodules not recommended for surveillance or biopsy per radiology recommendation.     7. Chronic pain of right  knee  Pt followed in Ortho cl. Keep appts. Cont Meloxicam as prescribed prn pain.     8. Breast cancer screening by mammogram  UTD MMG.    9. Colon cancer screening  Referred to [facility] 3-18-24 for colonoscopy-Pt states has appt for colonoscopy for 7-19-24. Keep appts.          Follow up in about 3 months (around 9/18/2024) for with labs 1 week prior to appt. . In addition to their scheduled follow up, the patient has also been instructed to follow up on as needed basis.     Future Appointments   Date Time Provider Department Center   6/25/2024  9:10 AM Gillian Lundberg FNP [facility] GYN [facility]        BERTHA Cooper

## 2024-07-01 ENCOUNTER — CLINICAL SUPPORT (OUTPATIENT)
Dept: AUDIOLOGY | Facility: HOSPITAL | Age: 64
End: 2024-07-01
Payer: MEDICARE

## 2024-07-01 DIAGNOSIS — R42 VERTIGO: ICD-10-CM

## 2024-07-01 DIAGNOSIS — H81.12 BPPV (BENIGN PAROXYSMAL POSITIONAL VERTIGO), LEFT: Primary | ICD-10-CM

## 2024-07-01 DIAGNOSIS — H81.313 AURAL VERTIGO, BILATERAL: ICD-10-CM

## 2024-07-01 PROCEDURE — 92557 COMPREHENSIVE HEARING TEST: CPT | Performed by: AUDIOLOGIST

## 2024-07-01 PROCEDURE — 92567 TYMPANOMETRY: CPT | Performed by: AUDIOLOGIST

## 2024-07-01 PROCEDURE — 95992 CANALITH REPOSITIONING PROC: CPT | Performed by: AUDIOLOGIST

## 2024-07-01 NOTE — PROGRESS NOTES
Hearing Evaluation      Patient History: Mrs. Parker is in for a hearing evaluation and hallpike screening. Hearing loss, tinnitus, and middle ear issues are denied. Positional vertigo is endorsed mainly when turning to the left side, onset about 3 weeks ago. All additional history is unremarkable.      Test Results:       Pure Tone Testing:     Right ear:   Normal peripheral hearing sensitivity from 250-8kHz. Speech reception threshold is in agreement with puretone findings.  Discrimination score of 100% is considered excellent.      Left ear: Normal peripheral hearing sensitivity from 250-8kHz. Speech reception threshold is in agreement with puretone findings.  Discrimination score of 100% is considered excellent.         Tympanometry:        Right ear:   Type 'A' tymp, normal middle ear pressure/function    Left ear: Type 'A' tymp, normal middle ear pressure/function       Hallpike screening:         Right ear: Negative for BPPV      Left ear: Positive for BPPV         Interpretations:     Behavioral test findings indicate hearing within normal limits, bilaterally. Speech reception thresholds obtained at 15dB, AU, and are in agreement with puretone findings. Speech discrimination scores of 100%, AU, are considered excellent.   Immittance measures indicate normal middle ear pressure/function, bilaterally. Epley maneuver implemented immediately following positive hallpike findings and repeated to ensure successful treatment.        Recommendations:    Post-maneuver instructions given and explained thoroughly  Patient instructed to call if symptoms return

## 2024-07-15 ENCOUNTER — OFFICE VISIT (OUTPATIENT)
Dept: GYNECOLOGY | Facility: CLINIC | Age: 64
End: 2024-07-15
Payer: MEDICARE

## 2024-07-15 VITALS
OXYGEN SATURATION: 100 % | SYSTOLIC BLOOD PRESSURE: 113 MMHG | TEMPERATURE: 98 F | DIASTOLIC BLOOD PRESSURE: 66 MMHG | WEIGHT: 149.81 LBS | RESPIRATION RATE: 18 BRPM | BODY MASS INDEX: 22.7 KG/M2 | HEIGHT: 68 IN | HEART RATE: 84 BPM

## 2024-07-15 DIAGNOSIS — Z01.419 WELL WOMAN EXAM WITH ROUTINE GYNECOLOGICAL EXAM: Primary | ICD-10-CM

## 2024-07-15 DIAGNOSIS — Z12.31 ENCOUNTER FOR SCREENING MAMMOGRAM FOR BREAST CANCER: ICD-10-CM

## 2024-07-15 PROCEDURE — 1101F PT FALLS ASSESS-DOCD LE1/YR: CPT | Mod: CPTII,,,

## 2024-07-15 PROCEDURE — G0101 CA SCREEN;PELVIC/BREAST EXAM: HCPCS | Mod: S$PBB,,,

## 2024-07-15 PROCEDURE — 1159F MED LIST DOCD IN RCRD: CPT | Mod: CPTII,,,

## 2024-07-15 PROCEDURE — 3074F SYST BP LT 130 MM HG: CPT | Mod: CPTII,,,

## 2024-07-15 PROCEDURE — 99214 OFFICE O/P EST MOD 30 MIN: CPT | Mod: PBBFAC

## 2024-07-15 PROCEDURE — 3078F DIAST BP <80 MM HG: CPT | Mod: CPTII,,,

## 2024-07-15 PROCEDURE — 1126F AMNT PAIN NOTED NONE PRSNT: CPT | Mod: CPTII,,,

## 2024-07-15 PROCEDURE — 4010F ACE/ARB THERAPY RXD/TAKEN: CPT | Mod: CPTII,,,

## 2024-07-15 PROCEDURE — 3044F HG A1C LEVEL LT 7.0%: CPT | Mod: CPTII,,,

## 2024-07-15 PROCEDURE — 3288F FALL RISK ASSESSMENT DOCD: CPT | Mod: CPTII,,,

## 2024-07-15 PROCEDURE — G0101 CA SCREEN;PELVIC/BREAST EXAM: HCPCS | Mod: PBBFAC

## 2024-07-15 NOTE — PROGRESS NOTES
[facility] -  Gynecology / Women's Health Clinic     Subjective:      Patient ID: Julianne Parker is a 64 y.o. female.    Chief Complaint:  Gynecologic Exam      History of Present Illness:  The patient  here for annual exam. Pt Postmenopausal 20+ yrs. Denies history of abnormal paps. Last pap -NIL and HPV neg; - NIL & HPV neg;  - NIL & HPV neg.  MG- 24 & BIRADS 1. Denies breast or urinary complaints. Denies pelvic pain, abnormal bleeding or discharge. Pt reports no STIs in the past and no concerns. Denies sexual activity. Denies hot flashes. Denies tobacco use. Denies fly hx of ovarian or uterine cancer. Maternal aunt with breast cancer. Sister with colon cancer. DEXA  osteopenia pt taking Vitamin D. Colonoscopy scheduled for this Friday.    GYN & OB History:  No LMP recorded. Patient is postmenopausal.     OB History    Para Term  AB Living   2 2 2 0 0 2   SAB IAB Ectopic Multiple Live Births   0 0 0   2      # Outcome Date GA Lbr Giovani/2nd Weight Sex Type Anes PTL Lv   2 Term     U CS-LTranv   [facility]   1 Term     U CS-Unspec   [facility]       Past Medical History:   Diagnosis Date    Bradycardia     Essential (primary) hypertension     Hypothyroidism, unspecified     Menopause     Microscopic hematuria     Nontoxic single thyroid nodule     Sleep disorder not due to a substance or known physiological condition, unspecified         Past Surgical History:   Procedure Laterality Date    KNEE ARTHROSCOPY W/ MENISCECTOMY Right 2024    Procedure: ARTHROSCOPY, KNEE, WITH MENISCECTOMY;  Surgeon: Leo Saucedo MD;  Location: [facility];  Service: Orthopedics;  Laterality: Right;    NO PAST SURGERIES          Social History     Tobacco Use    Smoking status: Never     Passive exposure: Past    Smokeless tobacco: Never   Substance and Sexual Activity    Alcohol use: Never    Drug use: Never    Sexual activity: Yes     Partners: Male        Current Outpatient  "Medications   Medication Instructions    amLODIPine (NORVASC) 10 mg, Oral, Daily    aspirin (ECOTRIN) 81 mg, Oral, Daily    blood pressure monitor (BLOOD PRESSURE KIT) Kit Use daily to assess blood pressure    ceramides 1,3,6-II (CERAVE) Crea Apply as needed    levothyroxine (SYNTHROID) 75 mcg, Oral, Before breakfast    losartan (COZAAR) 50 mg, Oral, Daily    meclizine (ANTIVERT) 12.5 mg, Oral, 3 times daily PRN    meloxicam (MOBIC) 15 mg, Oral, Daily    vitamin D (VITAMIN D3) 2,000 Units, Oral, Daily       Review of patient's allergies indicates:   Allergen Reactions    Hydrochlorothiazide      Other reaction(s): rash  Rash    Hydrocortisone Other (See Comments)    Lisinopril Edema    Sulfamethoxazole-trimethoprim Other (See Comments)    Hydrobenzthiazide Rash         Review of Systems:  Review of Systems  Negative except for pertinent findings for positives per HPI.     Objective:     Physical Exam   Visit Vitals  /66   Pulse 84   Temp 98.4 °F (36.9 °C) (Oral)   Resp 18   Ht 5' 8" (1.727 m)   Wt 67.9 kg (149 lb 12.8 oz)   SpO2 100%   BMI 22.78 kg/m²       GENERAL: Well-developed female. No acute distress.    SKIN: Normal to inspection, warm and intact.  BREASTS: No rashes or erythema. No masses, lumps, discharge, tenderness.  VULVA: General appearance normal; external genitalia with no lesions or erythema.  VAGINA: Mucosa/vaginal vault pale, no abnormal discharge or lesions.  CERVIX: Pale, parous appearing os, cervix high in vault flush to wall, no erythema or abnormal discharge.  BIMANUAL EXAM: reveals a 8 week-sized uterus. The uterus is non tender. Bilateral adnexa reveal no tenderness.  PSYCHIATRIC: Patient is oriented to person, place, and time. Mood and affect are normal.    Assessment:       ICD-10-CM ICD-9-CM   1. Well woman exam with routine gynecological exam  Z01.419 V72.31   2. Encounter for screening mammogram for breast cancer  Z12.31 V76.12       Plan:     1. Well woman exam with routine " gynecological exam  -     Ambulatory referral/consult to Gynecology    2. Encounter for screening mammogram for breast cancer  -     Mammo Digital Screening Bilat w/ Migel; Future; Expected date: 04/08/2025    Pelvic exam today, pap deferred d/t ACOG recommendations on age  MG ordered    Vitamin D and calcium info sheet given to patient    Call with any vaginal bleeding which would be abnormal  Follow up in about 1 year (around 7/15/2025) for Annual.

## 2024-07-26 DIAGNOSIS — C20 CARCINOMA OF RECTUM: Primary | ICD-10-CM

## 2024-08-08 ENCOUNTER — OFFICE VISIT (OUTPATIENT)
Dept: SURGICAL ONCOLOGY | Facility: CLINIC | Age: 64
End: 2024-08-08
Payer: MEDICARE

## 2024-08-08 VITALS
SYSTOLIC BLOOD PRESSURE: 147 MMHG | WEIGHT: 149.81 LBS | DIASTOLIC BLOOD PRESSURE: 78 MMHG | HEIGHT: 68 IN | BODY MASS INDEX: 22.7 KG/M2

## 2024-08-08 DIAGNOSIS — D3A.8 BENIGN NEUROENDOCRINE TUMOR OF RECTUM: Primary | ICD-10-CM

## 2024-08-08 PROCEDURE — 99999 PR PBB SHADOW E&M-EST. PATIENT-LVL III: CPT | Mod: PBBFAC,,, | Performed by: COLON & RECTAL SURGERY

## 2024-08-08 PROCEDURE — 3077F SYST BP >= 140 MM HG: CPT | Mod: CPTII,S$GLB,, | Performed by: COLON & RECTAL SURGERY

## 2024-08-08 PROCEDURE — 3008F BODY MASS INDEX DOCD: CPT | Mod: CPTII,S$GLB,, | Performed by: COLON & RECTAL SURGERY

## 2024-08-08 PROCEDURE — 4010F ACE/ARB THERAPY RXD/TAKEN: CPT | Mod: CPTII,S$GLB,, | Performed by: COLON & RECTAL SURGERY

## 2024-08-08 PROCEDURE — 1160F RVW MEDS BY RX/DR IN RCRD: CPT | Mod: CPTII,S$GLB,, | Performed by: COLON & RECTAL SURGERY

## 2024-08-08 PROCEDURE — 1159F MED LIST DOCD IN RCRD: CPT | Mod: CPTII,S$GLB,, | Performed by: COLON & RECTAL SURGERY

## 2024-08-08 PROCEDURE — 99204 OFFICE O/P NEW MOD 45 MIN: CPT | Mod: S$GLB,,, | Performed by: COLON & RECTAL SURGERY

## 2024-08-08 PROCEDURE — 3288F FALL RISK ASSESSMENT DOCD: CPT | Mod: CPTII,S$GLB,, | Performed by: COLON & RECTAL SURGERY

## 2024-08-08 PROCEDURE — 3078F DIAST BP <80 MM HG: CPT | Mod: CPTII,S$GLB,, | Performed by: COLON & RECTAL SURGERY

## 2024-08-08 PROCEDURE — 1101F PT FALLS ASSESS-DOCD LE1/YR: CPT | Mod: CPTII,S$GLB,, | Performed by: COLON & RECTAL SURGERY

## 2024-08-08 PROCEDURE — 3044F HG A1C LEVEL LT 7.0%: CPT | Mod: CPTII,S$GLB,, | Performed by: COLON & RECTAL SURGERY

## 2024-08-12 ENCOUNTER — HOSPITAL ENCOUNTER (OUTPATIENT)
Dept: RADIOLOGY | Facility: HOSPITAL | Age: 64
Discharge: HOME | End: 2024-08-12
Attending: SPECIALIST
Payer: MEDICARE

## 2024-08-12 DIAGNOSIS — C7A.8 PRIMARY NEUROENDOCRINE CARCINOMA OF RECTUM: ICD-10-CM

## 2024-08-12 LAB
CREAT SERPL-MCNC: 0.67 MG/DL (ref 0.55–1.02)
GFR SERPLBLD CREATININE-BSD FMLA CKD-EPI: >60 ML/MIN/1.73/M2

## 2024-08-12 PROCEDURE — A9698 NON-RAD CONTRAST MATERIALNOC: HCPCS

## 2024-08-12 PROCEDURE — 82565 ASSAY OF CREATININE: CPT

## 2024-08-12 PROCEDURE — 25500020 PHARM REV CODE 255

## 2024-08-12 PROCEDURE — 74177 CT ABD & PELVIS W/CONTRAST: CPT | Mod: TC

## 2024-08-12 RX ADMIN — IOHEXOL 100 ML: 350 INJECTION, SOLUTION INTRAVENOUS at 12:08

## 2024-08-12 RX ADMIN — BARIUM SULFATE 450 ML: 20 SUSPENSION ORAL at 12:08

## 2024-08-26 DIAGNOSIS — D3A.8 BENIGN NEUROENDOCRINE TUMOR OF RECTUM: Primary | ICD-10-CM

## 2024-09-10 ENCOUNTER — HOSPITAL ENCOUNTER (OUTPATIENT)
Dept: RADIOLOGY | Facility: HOSPITAL | Age: 64
Discharge: HOME | End: 2024-09-10
Attending: COLON & RECTAL SURGERY
Payer: MEDICARE

## 2024-09-10 DIAGNOSIS — D3A.8 BENIGN NEUROENDOCRINE TUMOR OF RECTUM: ICD-10-CM

## 2024-09-10 PROCEDURE — A9592 HC COPPER CU-64, DOTATE, DX, PER 1 MCI: HCPCS | Performed by: COLON & RECTAL SURGERY

## 2024-09-10 PROCEDURE — 78815 PET IMAGE W/CT SKULL-THIGH: CPT | Mod: TC

## 2024-09-10 RX ADMIN — COPPER CU 64 DOTATATE 4.4 MILLICURIE: 1 INJECTION, SOLUTION INTRAVENOUS at 10:09

## 2024-09-17 ENCOUNTER — LAB VISIT (OUTPATIENT)
Dept: LAB | Facility: HOSPITAL | Age: 64
End: 2024-09-17
Attending: NURSE PRACTITIONER
Payer: MEDICARE

## 2024-09-17 DIAGNOSIS — I10 PRIMARY HYPERTENSION: ICD-10-CM

## 2024-09-17 DIAGNOSIS — R31.29 MICROSCOPIC HEMATURIA: ICD-10-CM

## 2024-09-17 LAB
ANION GAP SERPL CALC-SCNC: 7 MEQ/L
BACTERIA #/AREA URNS AUTO: ABNORMAL /HPF
BASOPHILS # BLD AUTO: 0.03 X10(3)/MCL
BASOPHILS NFR BLD AUTO: 1 %
BILIRUB UR QL STRIP.AUTO: NEGATIVE
BUN SERPL-MCNC: 10.2 MG/DL (ref 9.8–20.1)
CALCIUM SERPL-MCNC: 9.6 MG/DL (ref 8.4–10.2)
CHLORIDE SERPL-SCNC: 107 MMOL/L (ref 98–107)
CLARITY UR: CLEAR
CO2 SERPL-SCNC: 26 MMOL/L (ref 23–31)
COLOR UR AUTO: ABNORMAL
CREAT SERPL-MCNC: 0.76 MG/DL (ref 0.55–1.02)
CREAT/UREA NIT SERPL: 13
EOSINOPHIL # BLD AUTO: 0.17 X10(3)/MCL (ref 0–0.9)
EOSINOPHIL NFR BLD AUTO: 5.7 %
ERYTHROCYTE [DISTWIDTH] IN BLOOD BY AUTOMATED COUNT: 13.7 % (ref 11.5–17)
GFR SERPLBLD CREATININE-BSD FMLA CKD-EPI: >60 ML/MIN/1.73/M2
GLUCOSE SERPL-MCNC: 82 MG/DL (ref 82–115)
GLUCOSE UR QL STRIP: NORMAL
HCT VFR BLD AUTO: 35.8 % (ref 37–47)
HGB BLD-MCNC: 11.8 G/DL (ref 12–16)
HGB UR QL STRIP: ABNORMAL
HYALINE CASTS #/AREA URNS LPF: ABNORMAL /LPF
IMM GRANULOCYTES # BLD AUTO: 0 X10(3)/MCL (ref 0–0.04)
IMM GRANULOCYTES NFR BLD AUTO: 0 %
KETONES UR QL STRIP: NEGATIVE
LEUKOCYTE ESTERASE UR QL STRIP: NEGATIVE
LYMPHOCYTES # BLD AUTO: 1.01 X10(3)/MCL (ref 0.6–4.6)
LYMPHOCYTES NFR BLD AUTO: 33.7 %
MCH RBC QN AUTO: 29.3 PG (ref 27–31)
MCHC RBC AUTO-ENTMCNC: 33 G/DL (ref 33–36)
MCV RBC AUTO: 88.8 FL (ref 80–94)
MONOCYTES # BLD AUTO: 0.34 X10(3)/MCL (ref 0.1–1.3)
MONOCYTES NFR BLD AUTO: 11.3 %
MUCOUS THREADS URNS QL MICRO: ABNORMAL /LPF
NEUTROPHILS # BLD AUTO: 1.45 X10(3)/MCL (ref 2.1–9.2)
NEUTROPHILS NFR BLD AUTO: 48.3 %
NITRITE UR QL STRIP: NEGATIVE
NRBC BLD AUTO-RTO: 0 %
PH UR STRIP: 5.5 [PH]
PLATELET # BLD AUTO: 267 X10(3)/MCL (ref 130–400)
PMV BLD AUTO: 8.7 FL (ref 7.4–10.4)
POTASSIUM SERPL-SCNC: 3.9 MMOL/L (ref 3.5–5.1)
PROT UR QL STRIP: NEGATIVE
RBC # BLD AUTO: 4.03 X10(6)/MCL (ref 4.2–5.4)
RBC #/AREA URNS AUTO: ABNORMAL /HPF
SODIUM SERPL-SCNC: 140 MMOL/L (ref 136–145)
SP GR UR STRIP.AUTO: 1.02 (ref 1–1.03)
SQUAMOUS #/AREA URNS LPF: ABNORMAL /HPF
UROBILINOGEN UR STRIP-ACNC: NORMAL
WBC # BLD AUTO: 3 X10(3)/MCL (ref 4.5–11.5)
WBC #/AREA URNS AUTO: ABNORMAL /HPF

## 2024-09-17 PROCEDURE — 81001 URINALYSIS AUTO W/SCOPE: CPT

## 2024-09-17 PROCEDURE — 88108 CYTOPATH CONCENTRATE TECH: CPT | Mod: TC

## 2024-09-17 PROCEDURE — 36415 COLL VENOUS BLD VENIPUNCTURE: CPT

## 2024-09-17 PROCEDURE — 85025 COMPLETE CBC W/AUTO DIFF WBC: CPT

## 2024-09-17 PROCEDURE — 80048 BASIC METABOLIC PNL TOTAL CA: CPT

## 2024-09-18 ENCOUNTER — OFFICE VISIT (OUTPATIENT)
Dept: INTERNAL MEDICINE | Facility: CLINIC | Age: 64
End: 2024-09-18
Payer: MEDICARE

## 2024-09-18 VITALS
TEMPERATURE: 98 F | WEIGHT: 149 LBS | HEIGHT: 68 IN | BODY MASS INDEX: 22.58 KG/M2 | RESPIRATION RATE: 18 BRPM | HEART RATE: 56 BPM | DIASTOLIC BLOOD PRESSURE: 71 MMHG | SYSTOLIC BLOOD PRESSURE: 132 MMHG

## 2024-09-18 DIAGNOSIS — M25.561 CHRONIC PAIN OF RIGHT KNEE: ICD-10-CM

## 2024-09-18 DIAGNOSIS — Z12.31 BREAST CANCER SCREENING BY MAMMOGRAM: ICD-10-CM

## 2024-09-18 DIAGNOSIS — E78.00 HIGH CHOLESTEROL: ICD-10-CM

## 2024-09-18 DIAGNOSIS — I10 HYPERTENSION, UNSPECIFIED TYPE: ICD-10-CM

## 2024-09-18 DIAGNOSIS — E04.1 THYROID NODULE: ICD-10-CM

## 2024-09-18 DIAGNOSIS — I10 PRIMARY HYPERTENSION: Primary | ICD-10-CM

## 2024-09-18 DIAGNOSIS — G89.29 CHRONIC PAIN OF RIGHT KNEE: ICD-10-CM

## 2024-09-18 DIAGNOSIS — E03.9 HYPOTHYROIDISM, UNSPECIFIED TYPE: ICD-10-CM

## 2024-09-18 DIAGNOSIS — R31.29 MICROSCOPIC HEMATURIA: ICD-10-CM

## 2024-09-18 DIAGNOSIS — R00.1 BRADYCARDIA: ICD-10-CM

## 2024-09-18 DIAGNOSIS — D3A.8 NEUROENDOCRINE TUMOR: ICD-10-CM

## 2024-09-18 DIAGNOSIS — E03.9 ACQUIRED HYPOTHYROIDISM: ICD-10-CM

## 2024-09-18 PROCEDURE — 1160F RVW MEDS BY RX/DR IN RCRD: CPT | Mod: CPTII,,, | Performed by: NURSE PRACTITIONER

## 2024-09-18 PROCEDURE — 1101F PT FALLS ASSESS-DOCD LE1/YR: CPT | Mod: CPTII,,, | Performed by: NURSE PRACTITIONER

## 2024-09-18 PROCEDURE — 1126F AMNT PAIN NOTED NONE PRSNT: CPT | Mod: CPTII,,, | Performed by: NURSE PRACTITIONER

## 2024-09-18 PROCEDURE — 99214 OFFICE O/P EST MOD 30 MIN: CPT | Mod: S$PBB,,, | Performed by: NURSE PRACTITIONER

## 2024-09-18 PROCEDURE — 3288F FALL RISK ASSESSMENT DOCD: CPT | Mod: CPTII,,, | Performed by: NURSE PRACTITIONER

## 2024-09-18 PROCEDURE — 3008F BODY MASS INDEX DOCD: CPT | Mod: CPTII,,, | Performed by: NURSE PRACTITIONER

## 2024-09-18 PROCEDURE — 1159F MED LIST DOCD IN RCRD: CPT | Mod: CPTII,,, | Performed by: NURSE PRACTITIONER

## 2024-09-18 PROCEDURE — 3075F SYST BP GE 130 - 139MM HG: CPT | Mod: CPTII,,, | Performed by: NURSE PRACTITIONER

## 2024-09-18 PROCEDURE — 4010F ACE/ARB THERAPY RXD/TAKEN: CPT | Mod: CPTII,,, | Performed by: NURSE PRACTITIONER

## 2024-09-18 PROCEDURE — 3078F DIAST BP <80 MM HG: CPT | Mod: CPTII,,, | Performed by: NURSE PRACTITIONER

## 2024-09-18 PROCEDURE — 99215 OFFICE O/P EST HI 40 MIN: CPT | Mod: PBBFAC | Performed by: NURSE PRACTITIONER

## 2024-09-18 PROCEDURE — 3044F HG A1C LEVEL LT 7.0%: CPT | Mod: CPTII,,, | Performed by: NURSE PRACTITIONER

## 2024-09-18 RX ORDER — AMLODIPINE BESYLATE 10 MG/1
10 TABLET ORAL DAILY
Qty: 90 TABLET | Refills: 3 | Status: SHIPPED | OUTPATIENT
Start: 2024-09-18 | End: 2025-09-18

## 2024-09-18 RX ORDER — LEVOTHYROXINE SODIUM 75 UG/1
75 TABLET ORAL
Qty: 90 TABLET | Refills: 1 | Status: SHIPPED | OUTPATIENT
Start: 2024-09-18 | End: 2025-03-17

## 2024-09-18 NOTE — PROGRESS NOTES
Patient ID: [id number]     Chief Complaint: lab results        HPI:     HPI      Julianne Parker is a 64 y.o. female here today for a follow up.         Immunizations:   Immunization History   Administered Date(s) Administered    COVID-19 MRNA, LN-S PF (MODERNA HALF 0.25 ML DOSE) 11/17/2021    COVID-19 Vaccine 03/11/2021, 04/08/2021    COVID-19, MRNA, LN-S, PF (MODERNA FULL 0.5 ML DOSE) 03/11/2021, 04/08/2021    Td (ADULT) 02/19/1992, 05/01/2007    Tdap 12/30/2020        -------------------------------------    Bradycardia    Essential (primary) hypertension    Hypothyroidism, unspecified    Menopause    Microscopic hematuria    Nontoxic single thyroid nodule    Sleep disorder not due to a substance or known physiological condition, unspecified        Past Surgical History:   Procedure Laterality Date    KNEE ARTHROSCOPY W/ MENISCECTOMY Right 2/22/2024    Procedure: ARTHROSCOPY, KNEE, WITH MENISCECTOMY;  Surgeon: Leo Saucedo MD;  Location: [facility];  Service: Orthopedics;  Laterality: Right;    NO PAST SURGERIES         Review of patient's allergies indicates:   Allergen Reactions    Hydrochlorothiazide      Other reaction(s): rash  Rash    Hydrocortisone Other (See Comments)    Lisinopril Edema    Sulfamethoxazole-trimethoprim Other (See Comments)    Hydrobenzthiazide Rash       Current Outpatient Medications   Medication Instructions    amLODIPine (NORVASC) 10 mg, Oral, Daily    aspirin (ECOTRIN) 81 mg, Oral, Daily    blood pressure monitor (BLOOD PRESSURE KIT) Kit Use daily to assess blood pressure    ceramides 1,3,6-II (CERAVE) Crea Apply as needed    levothyroxine (SYNTHROID) 75 mcg, Oral, Before breakfast    losartan (COZAAR) 50 mg, Oral, Daily    meclizine (ANTIVERT) 12.5 mg, Oral, 3 times daily PRN    meloxicam (MOBIC) 15 mg, Oral, Daily    vitamin D (VITAMIN D3) 2,000 Units, Oral, Daily       Social History     Socioeconomic History    Marital status:      Spouse name: Manas    Number of  children: 2   Tobacco Use    Smoking status: Never     Passive exposure: Past    Smokeless tobacco: Never   Substance and Sexual Activity    Alcohol use: Never    Drug use: Never    Sexual activity: Yes     Partners: Male   Social History Narrative    ** Merged History Encounter **          Social Determinants of Health     Financial Resource Strain: Low Risk  (9/18/2024)    Overall Financial Resource Strain (CARDIA)     Difficulty of Paying Living Expenses: Not hard at all   Food Insecurity: No Food Insecurity (9/18/2024)    Hunger Vital Sign     Worried About Running Out of Food in the Last Year: Never true     Ran Out of Food in the Last Year: Never true   Transportation Needs: No Transportation Needs (9/18/2024)    TRANSPORTATION NEEDS     Transportation : No   Physical Activity: Insufficiently Active (9/18/2024)    Exercise Vital Sign     Days of Exercise per Week: 4 days     Minutes of Exercise per Session: 30 min   Stress: No Stress Concern Present (9/18/2024)    [employer] - Occupational Stress Questionnaire     Feeling of Stress : Not at all   Housing Stability: Low Risk  (9/18/2024)    Housing Stability Vital Sign     Unable to Pay for Housing in the Last Year: No     Homeless in the Last Year: No        Family History   Problem Relation Name Age of Onset    Diabetes type II Mother      Hypertension Mother      Hypertension Father          Patient Care Team:  Poonam Appiah FNP as PCP - General (Family Medicine)     Subjective:     Review of Systems     See HPI for details    Constitutional: Denies Change in appetite. Denies Chills. Denies Fever. Denies Night sweats.  Eye: Denies Blurred vision. Denies Discharge. Denies Eye pain.  ENT: Denies Decreased hearing. Denies Sore throat. Denies Swollen glands.  Respiratory: Denies Cough. Denies Shortness of breath. Denies Shortness of breath with exertion. Denies Wheezing.  Cardiovascular: DeniesChest pain at rest. Denies Chest  "pain with exertion. Denies Irregular heartbeat. Denies Palpitations. Denies Edema.  Gastrointestinal: Denies Abdominal pain. DeniesDiarrhea. Denies Nausea. Denies Vomiting. Denies Hematemesis or Hematochezia.  Genitourinary: Denies Dysuria. Denies Urinary frequency. Denies Urinary urgency. Denies Blood in urine.  Endocrine: Denies Cold intolerance. Denies Excessive thirst. Denies Heat intolerance. Denies Weight loss. Denies Weight gain.  Musculoskeletal: Denies Painful joints. Denies Weakness.  Integumentary: Denies Rash. Denies Itching. Denies Dry skin.  Neurologic: Denies Dizziness. Denies Fainting. Denies Headache.  Psychiatric: Denies Depression. Denies Anxiety. Denies Suicidal/Homicidal ideations.    All Other ROS: Negative except as stated in HPI.       Objective:     Visit Vitals  /71 (BP Location: Left arm, Patient Position: Sitting, BP Method: Large (Automatic))   Pulse (!) 56   Temp 98.2 °F (36.8 °C) (Oral)   Resp 18   Ht 5' 8" (1.727 m)   Wt 67.6 kg (149 lb)   BMI 22.66 kg/m²       Physical Exam    General: Alert and oriented, No acute distress.  Head: Normocephalic, Atraumatic.  Eye: Pupils are equal, round and reactive to light, Extraocular movements are intact, Sclera non-icteric.  Ears/Nose/Throat: Normal, Mucosa moist,Clear.  Neck/Thyroid: Supple, Non-tender, No carotid bruit, No lymphadenopathy, No JVD, Full range of motion.  Respiratory: Clear to auscultation bilaterally; No wheezes, rales or rhonchi,Non-labored respirations, Symmetrical chest wall expansion.  Cardiovascular: Regular rate and rhythm, S1/S2 normal, No murmurs, rubs or gallops.  Gastrointestinal: Soft, Non-tender, Non-distended, Normal bowel sounds, No palpable organomegaly.  Musculoskeletal: Normal range of motion.  Integumentary: Warm, Dry, Intact, No suspicious lesions or rashes.  Extremities: No clubbing, cyanosis or edema  Neurologic: No focal deficits, Cranial Nerves II-XII are grossly intact, Motor strength normal upper " and lower extremities, Sensory exam intact.  Psychiatric: Normal interaction, Coherent speech, Euthymic mood, Appropriate affect       Labs Reviewed:     Chemistry:  Lab Results   Component Value Date     09/17/2024    K 3.9 09/17/2024    BUN 10.2 09/17/2024    CREATININE 0.76 09/17/2024    EGFRNORACEVR >60 09/17/2024    GLUCOSE 82 09/17/2024    CALCIUM 9.6 09/17/2024    ALKPHOS 85 05/26/2024    LABPROT 7.0 05/26/2024    ALBUMIN 3.7 05/26/2024    BILIDIR 0.2 12/03/2021    IBILI 0.20 12/03/2021    AST 20 05/26/2024    ALT 16 05/26/2024    MG 2.30 05/26/2024    IGOKYIKY00CS 57.3 08/30/2023        Lab Results   Component Value Date    HGBA1C 5.6 03/14/2024        Hematology:  Lab Results   Component Value Date    WBC 3.00 (L) 09/17/2024    HGB 11.8 (L) 09/17/2024    HCT 35.8 (L) 09/17/2024     09/17/2024       Lipid Panel:  Lab Results   Component Value Date    CHOL 191 03/14/2024    HDL 64 (H) 03/14/2024    .00 03/14/2024    TRIG 43 03/14/2024    TOTALCHOLEST 3 03/14/2024        Urine:  Lab Results   Component Value Date    APPEARANCEUA Clear 09/17/2024    SGUA 1.016 09/17/2024    PROTEINUA Negative 09/17/2024    KETONESUA Negative 09/17/2024    LEUKOCYTESUR Negative 09/17/2024    RBCUA 0-5 09/17/2024    WBCUA 0-5 09/17/2024    BACTERIA None Seen 09/17/2024    SQEPUA Occ (A) 09/17/2024    HYALINECASTS None Seen 09/17/2024        Assessment:       ICD-10-CM ICD-9-CM   1. Primary hypertension  I10 401.9   2. Acquired hypothyroidism  E03.9 244.9   3. Bradycardia  R00.1 427.89   4. Microscopic hematuria  R31.29 599.72   5. High cholesterol  E78.00 272.0   6. Thyroid nodule  E04.1 241.0   7. Chronic pain of right knee  M25.561 719.46    G89.29 338.29   8. Breast cancer screening by mammogram  Z12.31 V76.12   9. Neuroendocrine tumor  D3A.8 209.60        Plan:     1. Primary hypertension  Low fat low salt diet and exercise. Cont Amlodipine as prescribed.     2. Acquired hypothyroidism  TSH WNL. Cont  Levothyroxine as prescribed.     3. Bradycardia  Pt followed [facility]. Keep appts.     4. Microscopic hematuria  Blood persists in urine. Pt followed in Uro- last seen 6-7-24 after cystoscope and RTC in 6 months. Keep appts.     5. High cholesterol  FLP WNL. Low fat diet and exercise.     6. Thyroid nodule  Thyroid US done 10-31-22:  US Thyroid  Order: [id number]  Status: Final result       Visible to patient: No (inaccessible in Patient Portal)       Next appt: 04/10/2024 at 09:00 AM in Orthopedics (Leo Saucedo MD)       Dx: Thyroid nodule    0 Result Notes       1 Follow-up Encounter  Details     Reading Physician Reading Date Result Priority   Shola Waldron MD  [phone] 10/31/2022 Routine      Narrative & Impression  EXAMINATION:  US THYROID     CLINICAL HISTORY:  , Nontoxic single thyroid nodule.     TECHNIQUE:  Multiple transverse and sagittal grayscale sonographic images were acquired through the thyroid gland.     FINDINGS:  Right hemithyroid measures 3.4 x 1.4 x 1.6, left joel thyroid measures 3.5 x 1.4 x 1.2 isthmus measures 3.1 mm in thickness.     Within the right joel thyroid there is a 7 mm x 6 mm x 7 mm subcentimeter nodule and within the left joel thyroid there is a 2 mm x 1 mm x 2 mm subcentimeter nodule none of these nodules meets criteria for biopsy and or surveillance.     No other focal abnormalities identified     Impression:     Subcentimeter nodules in the right and left joel thyroid as above        Electronically signed by: Shola Waldron  Date:                                            10/31/2022  Time:                                           14:18             Exam Ended: 10/31/22 10:45 CDT Last Resulted: 10/31/22 14:18 CDT            Sub-centimeter nodules not recommended for surveillance or biopsy per radiology recommendation.     7. Chronic pain of right knee  Pt followed in Ortho cl. Keep appts. Cont Meloxicam as prescribed prn pain.     8.  Breast cancer screening by mammogram  [facility].     9. Neuroendocrine tumor  Pt had colonoscopy done 7-19-24 with Polyp X 1 and 12 mm polyp suspicious for CA- BX results show well differentiated Neuroendocrine tumor WHO Grade 1. Pt followed by Surgical oncologist. Keep appts.          Follow up in about 3 months (around 12/18/2024) for with labs 1 week prior to appt. . In addition to their scheduled follow up, the patient has also been instructed to follow up on as needed basis.     Future Appointments   Date Time Provider Department Center   9/23/2024  3:30 PM [facility]   7/15/2025  8:50 AM Gillian Lundberg FNP [facility]        BERTHA Cooper

## 2024-09-19 LAB
ESTROGEN SERPL-MCNC: NORMAL PG/ML
INSULIN SERPL-ACNC: NORMAL U[IU]/ML
LAB AP CLINICAL INFORMATION: NORMAL
LAB AP GROSS DESCRIPTION: NORMAL
LAB AP URINE CYTOLOGY INTERPRETATION SPECIMEN 1: NORMAL

## 2024-09-23 ENCOUNTER — OFFICE VISIT (OUTPATIENT)
Dept: SURGICAL ONCOLOGY | Facility: CLINIC | Age: 64
End: 2024-09-23
Payer: MEDICARE

## 2024-09-23 VITALS
WEIGHT: 150 LBS | HEIGHT: 68 IN | DIASTOLIC BLOOD PRESSURE: 52 MMHG | SYSTOLIC BLOOD PRESSURE: 133 MMHG | BODY MASS INDEX: 22.73 KG/M2

## 2024-09-23 DIAGNOSIS — D3A.8 BENIGN NEUROENDOCRINE TUMOR OF RECTUM: Primary | ICD-10-CM

## 2024-09-23 PROBLEM — Z00.00 WELL ADULT EXAM: Status: RESOLVED | Noted: 2024-03-18 | Resolved: 2024-09-23

## 2024-09-23 PROCEDURE — 4010F ACE/ARB THERAPY RXD/TAKEN: CPT | Mod: CPTII,S$GLB,, | Performed by: COLON & RECTAL SURGERY

## 2024-09-23 PROCEDURE — 3288F FALL RISK ASSESSMENT DOCD: CPT | Mod: CPTII,S$GLB,, | Performed by: COLON & RECTAL SURGERY

## 2024-09-23 PROCEDURE — 3044F HG A1C LEVEL LT 7.0%: CPT | Mod: CPTII,S$GLB,, | Performed by: COLON & RECTAL SURGERY

## 2024-09-23 PROCEDURE — 99999 PR PBB SHADOW E&M-EST. PATIENT-LVL III: CPT | Mod: PBBFAC,,, | Performed by: COLON & RECTAL SURGERY

## 2024-09-23 PROCEDURE — 99214 OFFICE O/P EST MOD 30 MIN: CPT | Mod: S$GLB,,, | Performed by: COLON & RECTAL SURGERY

## 2024-09-23 PROCEDURE — 1159F MED LIST DOCD IN RCRD: CPT | Mod: CPTII,S$GLB,, | Performed by: COLON & RECTAL SURGERY

## 2024-09-23 PROCEDURE — 3078F DIAST BP <80 MM HG: CPT | Mod: CPTII,S$GLB,, | Performed by: COLON & RECTAL SURGERY

## 2024-09-23 PROCEDURE — 3008F BODY MASS INDEX DOCD: CPT | Mod: CPTII,S$GLB,, | Performed by: COLON & RECTAL SURGERY

## 2024-09-23 PROCEDURE — 1160F RVW MEDS BY RX/DR IN RCRD: CPT | Mod: CPTII,S$GLB,, | Performed by: COLON & RECTAL SURGERY

## 2024-09-23 PROCEDURE — 3075F SYST BP GE 130 - 139MM HG: CPT | Mod: CPTII,S$GLB,, | Performed by: COLON & RECTAL SURGERY

## 2024-09-23 PROCEDURE — 1101F PT FALLS ASSESS-DOCD LE1/YR: CPT | Mod: CPTII,S$GLB,, | Performed by: COLON & RECTAL SURGERY

## 2024-09-23 NOTE — PROGRESS NOTES
Patient ID: [id number]     Chief Complaint: EUA DISCUSS      HPI:     Julianne Parker is a 64 y.o. female here today to follow-up imaging results and schedule surgery.  She was referred by Dr. Farrukh Mccall for management of primary neuroendocrine carcinoma of the rectum.  She underwent her 1st screening colonoscopy 07/19/2024 and was found to have a 3 mm tubular adenoma in the transverse colon and a 12 mm biopsy-proven well-differentiated neuroendocrine tumor in the distal rectum.  According to the colonoscopy report, the lesion was palpable on JORDEN.  She denies abdominal pain, constipation, diarrhea, rectal bleeding, and flushing.  Dotatate scan was negative.    Past Medical History:  has a past medical history of Bradycardia, Essential (primary) hypertension, Hypothyroidism, unspecified, Menopause, Microscopic hematuria, Nontoxic single thyroid nodule, and Sleep disorder not due to a substance or known physiological condition, unspecified.    Surgical History:  has a past surgical history that includes No past surgeries and Knee arthroscopy w/ meniscectomy (Right, 2/22/2024).    Family History: family history includes Diabetes type II in her mother; Hypertension in her father and mother.    Social History:  reports that she has never smoked. She has been exposed to tobacco smoke. She has never used smokeless tobacco. She reports that she does not drink alcohol and does not use drugs.    Current Outpatient Medications   Medication Instructions    amLODIPine (NORVASC) 10 mg, Oral, Daily    aspirin (ECOTRIN) 81 mg, Oral, Daily    blood pressure monitor (BLOOD PRESSURE KIT) Kit Use daily to assess blood pressure    ceramides 1,3,6-II (CERAVE) Crea Apply as needed    levothyroxine (SYNTHROID) 75 mcg, Oral, Before breakfast    losartan (COZAAR) 50 mg, Oral, Daily    meclizine (ANTIVERT) 12.5 mg, Oral, 3 times daily PRN    meloxicam (MOBIC) 15 mg, Oral, Daily    vitamin D (VITAMIN D3) 2,000 Units, Oral, Daily  "      Patient is allergic to hydrochlorothiazide, hydrocortisone, lisinopril, sulfamethoxazole-trimethoprim, and hydrobenzthiazide.     Patient Care Team:  Poonam Appiah FNP as PCP - General (Family Medicine)       Subjective:     Review of Systems    12 point review of systems conducted, negative except as stated in the history of present illness. See HPI for details.      Objective:     Visit Vitals  BP (!) 133/52   Pulse (!) (P) 57   Ht 5' 8" (1.727 m)   Wt 68 kg (150 lb)   BMI 22.81 kg/m²       Physical Exam    General: Alert and oriented, No acute distress.  Head: Normocephalic, Atraumatic.  Eye: Pupils are equal and round, Extraocular movements are intact, Sclera non-icteric.  Ears/Nose/Throat: Normal, Mucosa moist, Clear.  Respiratory: No wheezes, Non-labored respirations, Symmetrical chest wall expansion.  Cardiovascular: Regular rate.  Gastrointestinal: Soft, Non-tender, Non-distended, Normal bowel sounds, No palpable masses.  Rectal: NST without palpable lesion.  Integumentary: Warm, Dry, Intact, No rashes.  Extremities: No edema.  Neurologic: No focal deficits.  Psychiatric: Normal interaction, Coherent speech, Euthymic mood, Appropriate affect.       Labs Reviewed:     Chemistry:  Lab Results   Component Value Date     09/17/2024    K 3.9 09/17/2024    BUN 10.2 09/17/2024    CREATININE 0.76 09/17/2024    EGFRNORACEVR >60 09/17/2024    GLUCOSE 82 09/17/2024    CALCIUM 9.6 09/17/2024    ALKPHOS 85 05/26/2024    LABPROT 7.0 05/26/2024    ALBUMIN 3.7 05/26/2024    BILIDIR 0.2 12/03/2021    IBILI 0.20 12/03/2021    AST 20 05/26/2024    ALT 16 05/26/2024    MG 2.30 05/26/2024        Hematology:  Lab Results   Component Value Date    WBC 3.00 (L) 09/17/2024    HGB 11.8 (L) 09/17/2024    HCT 35.8 (L) 09/17/2024     09/17/2024         Assessment:       ICD-10-CM ICD-9-CM   1. Benign neuroendocrine tumor of rectum  D3A.8 209.57          Plan:     I explained that per the colonoscopy report Dr." Stefany was unable to completely remove the lesion; so even though the imaging was negative, she needs an EUA and possible transanal excision.       No follow-ups on file. In addition to their scheduled follow up, the patient has also been instructed to follow up on as needed basis.     Future Appointments   Date Time Provider Department Center   12/19/2024  8:20 AM Poonam Appiah RE [facility]    7/15/2025  8:50 AM Gillian Lundberg [facility]        Aditya Espinal MD

## 2024-09-23 NOTE — H&P (VIEW-ONLY)
Patient ID: [id number]     Chief Complaint: EUA DISCUSS      HPI:     Julianne Parker is a 64 y.o. female here today to follow-up imaging results and schedule surgery.  She was referred by Dr. Farrukh Mccall for management of primary neuroendocrine carcinoma of the rectum.  She underwent her 1st screening colonoscopy 07/19/2024 and was found to have a 3 mm tubular adenoma in the transverse colon and a 12 mm biopsy-proven well-differentiated neuroendocrine tumor in the distal rectum.  According to the colonoscopy report, the lesion was palpable on JORDEN.  She denies abdominal pain, constipation, diarrhea, rectal bleeding, and flushing.  Dotatate scan was negative.    Past Medical History:  has a past medical history of Bradycardia, Essential (primary) hypertension, Hypothyroidism, unspecified, Menopause, Microscopic hematuria, Nontoxic single thyroid nodule, and Sleep disorder not due to a substance or known physiological condition, unspecified.    Surgical History:  has a past surgical history that includes No past surgeries and Knee arthroscopy w/ meniscectomy (Right, 2/22/2024).    Family History: family history includes Diabetes type II in her mother; Hypertension in her father and mother.    Social History:  reports that she has never smoked. She has been exposed to tobacco smoke. She has never used smokeless tobacco. She reports that she does not drink alcohol and does not use drugs.    Current Outpatient Medications   Medication Instructions    amLODIPine (NORVASC) 10 mg, Oral, Daily    aspirin (ECOTRIN) 81 mg, Oral, Daily    blood pressure monitor (BLOOD PRESSURE KIT) Kit Use daily to assess blood pressure    ceramides 1,3,6-II (CERAVE) Crea Apply as needed    levothyroxine (SYNTHROID) 75 mcg, Oral, Before breakfast    losartan (COZAAR) 50 mg, Oral, Daily    meclizine (ANTIVERT) 12.5 mg, Oral, 3 times daily PRN    meloxicam (MOBIC) 15 mg, Oral, Daily    vitamin D (VITAMIN D3) 2,000 Units, Oral, Daily  "      Patient is allergic to hydrochlorothiazide, hydrocortisone, lisinopril, sulfamethoxazole-trimethoprim, and hydrobenzthiazide.     Patient Care Team:  Poonam Appiah FNP as PCP - General (Family Medicine)       Subjective:     Review of Systems    12 point review of systems conducted, negative except as stated in the history of present illness. See HPI for details.      Objective:     Visit Vitals  BP (!) 133/52   Pulse (!) (P) 57   Ht 5' 8" (1.727 m)   Wt 68 kg (150 lb)   BMI 22.81 kg/m²       Physical Exam    General: Alert and oriented, No acute distress.  Head: Normocephalic, Atraumatic.  Eye: Pupils are equal and round, Extraocular movements are intact, Sclera non-icteric.  Ears/Nose/Throat: Normal, Mucosa moist, Clear.  Respiratory: No wheezes, Non-labored respirations, Symmetrical chest wall expansion.  Cardiovascular: Regular rate.  Gastrointestinal: Soft, Non-tender, Non-distended, Normal bowel sounds, No palpable masses.  Rectal: NST without palpable lesion.  Integumentary: Warm, Dry, Intact, No rashes.  Extremities: No edema.  Neurologic: No focal deficits.  Psychiatric: Normal interaction, Coherent speech, Euthymic mood, Appropriate affect.       Labs Reviewed:     Chemistry:  Lab Results   Component Value Date     09/17/2024    K 3.9 09/17/2024    BUN 10.2 09/17/2024    CREATININE 0.76 09/17/2024    EGFRNORACEVR >60 09/17/2024    GLUCOSE 82 09/17/2024    CALCIUM 9.6 09/17/2024    ALKPHOS 85 05/26/2024    LABPROT 7.0 05/26/2024    ALBUMIN 3.7 05/26/2024    BILIDIR 0.2 12/03/2021    IBILI 0.20 12/03/2021    AST 20 05/26/2024    ALT 16 05/26/2024    MG 2.30 05/26/2024        Hematology:  Lab Results   Component Value Date    WBC 3.00 (L) 09/17/2024    HGB 11.8 (L) 09/17/2024    HCT 35.8 (L) 09/17/2024     09/17/2024         Assessment:       ICD-10-CM ICD-9-CM   1. Benign neuroendocrine tumor of rectum  D3A.8 209.57          Plan:     I explained that per the colonoscopy report Dr." Stefany was unable to completely remove the lesion; so even though the imaging was negative, she needs an EUA and possible transanal excision.       No follow-ups on file. In addition to their scheduled follow up, the patient has also been instructed to follow up on as needed basis.     Future Appointments   Date Time Provider Department Center   12/19/2024  8:20 AM Poonam Appiah RE [facility]    7/15/2025  8:50 AM Gillian Lundberg [facility]        Aditya Espinal MD

## 2024-09-24 ENCOUNTER — ANESTHESIA EVENT (OUTPATIENT)
Dept: SURGERY | Facility: HOSPITAL | Age: 64
End: 2024-09-24
Payer: MEDICARE

## 2024-09-24 DIAGNOSIS — D3A.8 BENIGN NEUROENDOCRINE TUMOR OF RECTUM: Primary | ICD-10-CM

## 2024-09-24 DIAGNOSIS — D37.5 NEOPLASM OF UNCERTAIN BEHAVIOR OF RECTUM: ICD-10-CM

## 2024-09-24 RX ORDER — SODIUM CHLORIDE 9 MG/ML
INJECTION, SOLUTION INTRAVENOUS CONTINUOUS
OUTPATIENT
Start: 2024-09-24

## 2024-09-24 RX ORDER — LIDOCAINE HYDROCHLORIDE 10 MG/ML
1 INJECTION, SOLUTION EPIDURAL; INFILTRATION; INTRACAUDAL; PERINEURAL ONCE
OUTPATIENT
Start: 2024-09-24 | End: 2024-09-24

## 2024-09-26 ENCOUNTER — HOSPITAL ENCOUNTER (OUTPATIENT)
Dept: RADIOLOGY | Facility: HOSPITAL | Age: 64
Discharge: HOME | End: 2024-09-26
Attending: COLON & RECTAL SURGERY
Payer: MEDICARE

## 2024-09-26 DIAGNOSIS — D3A.8 BENIGN NEUROENDOCRINE TUMOR OF RECTUM: ICD-10-CM

## 2024-09-26 PROCEDURE — 71046 X-RAY EXAM CHEST 2 VIEWS: CPT | Mod: TC

## 2024-10-02 ENCOUNTER — HOSPITAL ENCOUNTER (OUTPATIENT)
Facility: HOSPITAL | Age: 64
Discharge: HOME | End: 2024-10-02
Attending: COLON & RECTAL SURGERY | Admitting: COLON & RECTAL SURGERY
Payer: MEDICARE

## 2024-10-02 ENCOUNTER — ANESTHESIA (OUTPATIENT)
Dept: SURGERY | Facility: HOSPITAL | Age: 64
End: 2024-10-02
Payer: MEDICARE

## 2024-10-02 VITALS
BODY MASS INDEX: 22.35 KG/M2 | DIASTOLIC BLOOD PRESSURE: 73 MMHG | OXYGEN SATURATION: 93 % | SYSTOLIC BLOOD PRESSURE: 121 MMHG | RESPIRATION RATE: 16 BRPM | HEIGHT: 68 IN | TEMPERATURE: 98 F | HEART RATE: 48 BPM | WEIGHT: 147.5 LBS

## 2024-10-02 DIAGNOSIS — D3A.8 BENIGN NEUROENDOCRINE TUMOR OF RECTUM: Primary | ICD-10-CM

## 2024-10-02 PROCEDURE — 37000008 HC ANESTHESIA 1ST 15 MINUTES: Performed by: COLON & RECTAL SURGERY

## 2024-10-02 PROCEDURE — 63600175 PHARM REV CODE 636 W HCPCS: Performed by: STUDENT IN AN ORGANIZED HEALTH CARE EDUCATION/TRAINING PROGRAM

## 2024-10-02 PROCEDURE — 88309 TISSUE EXAM BY PATHOLOGIST: CPT | Performed by: COLON & RECTAL SURGERY

## 2024-10-02 PROCEDURE — 25000003 PHARM REV CODE 250: Performed by: NURSE ANESTHETIST, CERTIFIED REGISTERED

## 2024-10-02 PROCEDURE — 71000015 HC POSTOP RECOV 1ST HR: Performed by: COLON & RECTAL SURGERY

## 2024-10-02 PROCEDURE — 36000707: Performed by: COLON & RECTAL SURGERY

## 2024-10-02 PROCEDURE — 71000033 HC RECOVERY, INTIAL HOUR: Performed by: COLON & RECTAL SURGERY

## 2024-10-02 PROCEDURE — 36000706: Performed by: COLON & RECTAL SURGERY

## 2024-10-02 PROCEDURE — 25000003 PHARM REV CODE 250: Performed by: COLON & RECTAL SURGERY

## 2024-10-02 PROCEDURE — 45172 EXC RECT TUM TRANSANAL FULL: CPT | Mod: ,,, | Performed by: COLON & RECTAL SURGERY

## 2024-10-02 PROCEDURE — 71000016 HC POSTOP RECOV ADDL HR: Performed by: COLON & RECTAL SURGERY

## 2024-10-02 PROCEDURE — 37000009 HC ANESTHESIA EA ADD 15 MINS: Performed by: COLON & RECTAL SURGERY

## 2024-10-02 PROCEDURE — 63600175 PHARM REV CODE 636 W HCPCS: Performed by: NURSE ANESTHETIST, CERTIFIED REGISTERED

## 2024-10-02 RX ORDER — PROPOFOL 10 MG/ML
VIAL (ML) INTRAVENOUS
Status: DISCONTINUED | OUTPATIENT
Start: 2024-10-02 | End: 2024-10-02

## 2024-10-02 RX ORDER — DEXAMETHASONE SODIUM PHOSPHATE 4 MG/ML
INJECTION, SOLUTION INTRA-ARTICULAR; INTRALESIONAL; INTRAMUSCULAR; INTRAVENOUS; SOFT TISSUE
Status: DISCONTINUED | OUTPATIENT
Start: 2024-10-02 | End: 2024-10-02

## 2024-10-02 RX ORDER — FENTANYL CITRATE 50 UG/ML
INJECTION, SOLUTION INTRAMUSCULAR; INTRAVENOUS
Status: DISCONTINUED | OUTPATIENT
Start: 2024-10-02 | End: 2024-10-02

## 2024-10-02 RX ORDER — GLYCOPYRROLATE 0.2 MG/ML
INJECTION INTRAMUSCULAR; INTRAVENOUS
Status: DISCONTINUED | OUTPATIENT
Start: 2024-10-02 | End: 2024-10-02

## 2024-10-02 RX ORDER — ACETAMINOPHEN 10 MG/ML
1000 INJECTION, SOLUTION INTRAVENOUS EVERY 8 HOURS
Status: DISCONTINUED | OUTPATIENT
Start: 2024-10-02 | End: 2024-10-02 | Stop reason: HOSPADM

## 2024-10-02 RX ORDER — MIDAZOLAM HYDROCHLORIDE 1 MG/ML
INJECTION INTRAMUSCULAR; INTRAVENOUS
Status: DISCONTINUED | OUTPATIENT
Start: 2024-10-02 | End: 2024-10-02

## 2024-10-02 RX ORDER — LIDOCAINE HYDROCHLORIDE 10 MG/ML
1 INJECTION, SOLUTION EPIDURAL; INFILTRATION; INTRACAUDAL; PERINEURAL ONCE
Status: DISCONTINUED | OUTPATIENT
Start: 2024-10-02 | End: 2024-10-02 | Stop reason: HOSPADM

## 2024-10-02 RX ORDER — LIDOCAINE HYDROCHLORIDE 20 MG/ML
INJECTION, SOLUTION EPIDURAL; INFILTRATION; INTRACAUDAL; PERINEURAL
Status: DISCONTINUED | OUTPATIENT
Start: 2024-10-02 | End: 2024-10-02

## 2024-10-02 RX ORDER — HYDROCODONE BITARTRATE AND ACETAMINOPHEN 5; 325 MG/1; MG/1
1 TABLET ORAL ONCE
Status: COMPLETED | OUTPATIENT
Start: 2024-10-02 | End: 2024-10-02

## 2024-10-02 RX ORDER — GLUCAGON 1 MG
1 KIT INJECTION
Status: DISCONTINUED | OUTPATIENT
Start: 2024-10-02 | End: 2024-10-02 | Stop reason: HOSPADM

## 2024-10-02 RX ORDER — HYDROCODONE BITARTRATE AND ACETAMINOPHEN 5; 325 MG/1; MG/1
1 TABLET ORAL EVERY 4 HOURS PRN
Qty: 20 TABLET | Refills: 0 | Status: SHIPPED | OUTPATIENT
Start: 2024-10-02

## 2024-10-02 RX ORDER — HYDROMORPHONE HYDROCHLORIDE 2 MG/ML
0.2 INJECTION, SOLUTION INTRAMUSCULAR; INTRAVENOUS; SUBCUTANEOUS EVERY 5 MIN PRN
Status: DISCONTINUED | OUTPATIENT
Start: 2024-10-02 | End: 2024-10-02 | Stop reason: HOSPADM

## 2024-10-02 RX ORDER — ONDANSETRON HYDROCHLORIDE 2 MG/ML
INJECTION, SOLUTION INTRAVENOUS
Status: DISCONTINUED | OUTPATIENT
Start: 2024-10-02 | End: 2024-10-02

## 2024-10-02 RX ORDER — HYDROCODONE BITARTRATE AND ACETAMINOPHEN 5; 325 MG/1; MG/1
1 TABLET ORAL ONCE
Status: DISCONTINUED | OUTPATIENT
Start: 2024-10-02 | End: 2024-10-02

## 2024-10-02 RX ORDER — ONDANSETRON HYDROCHLORIDE 2 MG/ML
4 INJECTION, SOLUTION INTRAVENOUS DAILY PRN
Status: DISCONTINUED | OUTPATIENT
Start: 2024-10-02 | End: 2024-10-02 | Stop reason: HOSPADM

## 2024-10-02 RX ORDER — DROPERIDOL 2.5 MG/ML
0.62 INJECTION, SOLUTION INTRAMUSCULAR; INTRAVENOUS ONCE AS NEEDED
Status: DISCONTINUED | OUTPATIENT
Start: 2024-10-02 | End: 2024-10-02 | Stop reason: HOSPADM

## 2024-10-02 RX ORDER — HYDROMORPHONE HYDROCHLORIDE 2 MG/ML
0.5 INJECTION, SOLUTION INTRAMUSCULAR; INTRAVENOUS; SUBCUTANEOUS EVERY 5 MIN PRN
Status: DISCONTINUED | OUTPATIENT
Start: 2024-10-02 | End: 2024-10-02 | Stop reason: HOSPADM

## 2024-10-02 RX ORDER — ROCURONIUM BROMIDE 10 MG/ML
INJECTION, SOLUTION INTRAVENOUS
Status: DISCONTINUED | OUTPATIENT
Start: 2024-10-02 | End: 2024-10-02

## 2024-10-02 RX ORDER — SODIUM CHLORIDE 9 MG/ML
INJECTION, SOLUTION INTRAVENOUS CONTINUOUS
Status: DISCONTINUED | OUTPATIENT
Start: 2024-10-02 | End: 2024-10-02 | Stop reason: HOSPADM

## 2024-10-02 RX ADMIN — SUGAMMADEX 140 MG: 100 INJECTION, SOLUTION INTRAVENOUS at 08:10

## 2024-10-02 RX ADMIN — ONDANSETRON 4 MG: 2 INJECTION INTRAMUSCULAR; INTRAVENOUS at 07:10

## 2024-10-02 RX ADMIN — Medication 1 ENEMA: at 06:10

## 2024-10-02 RX ADMIN — LIDOCAINE HYDROCHLORIDE 80 MG: 20 INJECTION, SOLUTION INTRAVENOUS at 07:10

## 2024-10-02 RX ADMIN — MIDAZOLAM HYDROCHLORIDE 2 MG: 1 INJECTION, SOLUTION INTRAMUSCULAR; INTRAVENOUS at 07:10

## 2024-10-02 RX ADMIN — ROCURONIUM BROMIDE 40 MG: 10 SOLUTION INTRAVENOUS at 07:10

## 2024-10-02 RX ADMIN — PROPOFOL 150 MG: 10 INJECTION, EMULSION INTRAVENOUS at 07:10

## 2024-10-02 RX ADMIN — SODIUM CHLORIDE, SODIUM GLUCONATE, SODIUM ACETATE, POTASSIUM CHLORIDE AND MAGNESIUM CHLORIDE: 526; 502; 368; 37; 30 INJECTION, SOLUTION INTRAVENOUS at 07:10

## 2024-10-02 RX ADMIN — GLYCOPYRROLATE 0.1 MG: 0.2 INJECTION INTRAMUSCULAR; INTRAVENOUS at 07:10

## 2024-10-02 RX ADMIN — DEXAMETHASONE SODIUM PHOSPHATE 4 MG: 4 INJECTION, SOLUTION INTRA-ARTICULAR; INTRALESIONAL; INTRAMUSCULAR; INTRAVENOUS; SOFT TISSUE at 07:10

## 2024-10-02 RX ADMIN — SODIUM CHLORIDE, SODIUM GLUCONATE, SODIUM ACETATE, POTASSIUM CHLORIDE AND MAGNESIUM CHLORIDE: 526; 502; 368; 37; 30 INJECTION, SOLUTION INTRAVENOUS at 08:10

## 2024-10-02 RX ADMIN — HYDROMORPHONE HYDROCHLORIDE 0.5 MG: 2 INJECTION INTRAMUSCULAR; INTRAVENOUS; SUBCUTANEOUS at 09:10

## 2024-10-02 RX ADMIN — FENTANYL CITRATE 50 MCG: 50 INJECTION, SOLUTION INTRAMUSCULAR; INTRAVENOUS at 08:10

## 2024-10-02 RX ADMIN — HYDROCODONE BITARTRATE AND ACETAMINOPHEN 1 TABLET: 5; 325 TABLET ORAL at 10:10

## 2024-10-02 NOTE — TRANSFER OF CARE
"Anesthesia Transfer of Care Note    Patient: Julianne Parker    Procedure(s) Performed: Procedure(s) (LRB):  Exam under anesthesia (N/A)  EXCISION, LESION, RECTUM, ANAL APPROACH (N/A)    Patient location: PACU    Anesthesia Type: general    Transport from OR: Transported from OR on room air with adequate spontaneous ventilation    Post pain: adequate analgesia    Post assessment: no apparent anesthetic complications    Post vital signs: stable    Level of consciousness: sedated and responds to stimulation    Nausea/Vomiting: no nausea/vomiting    Complications: none    Transfer of care protocol was followed      Last vitals: Visit Vitals  /64   Pulse 68   Temp 36 °C (96.8 °F)   Resp 16   Ht 5' 8" (1.727 m)   Wt 66.9 kg (147 lb 7.8 oz)   SpO2 98%   Breastfeeding No   BMI 22.43 kg/m²     "

## 2024-10-02 NOTE — OP NOTE
[facility] - Periop Services  Operative Note      Date of Procedure: 10/2/2024     Procedure:  Transanal excision of neuroendocrine tumor (full-thickness)    Surgeons and Role:     * Aditya Espinal MD - Primary     * Henri Dejesus MD - Resident - Assisting    Pre-Operative Diagnosis: Benign neuroendocrine tumor of rectum [D3A.8]    Post-Operative Diagnosis: Same    Anesthesia: General    Estimated Blood Loss (EBL): Less than 10 mL           Specimens: Neuroendocrine polypectomy site     Description of Technical Procedures:  After informed consent was obtained, patient was brought to the operating room.  General endotracheal anesthesia was administered by member of the anesthesia team.  Patient was placed in the left lateral decubitus position and proctoscopy was performed.  The only abnormality identified was a scar in the left lateral distal rectum.  Patient was then placed in the prone jennifer-knife position.  Buttocks were taped apart for exposure.  The perianal skin was prepped and draped in sterile surgical fashion.  A Fansler retractor was ultimately used for exposure.  The area of scar was grasped with an Allis clamp and noted to be firm.  This was then excised full-thickness with a 1 cm grossly negative margin using electrocautery.  It was oriented for the pathologist by placing a double stitch at the distal margin and a single stitch along the anterior margin then sent for permanent pathology.  The defect was then repaired with interrupted full-thickness 2-0 Vicryl sutures.  Upon completion of the suture line was hemostatic.  A dry gauze dressing was applied externally and secured with tape.  She tolerated the procedure well and there were no complications.  She was returned to the supine position.  She was awakened and extubated in the operating room then subsequently transferred to recovery in satisfactory condition.

## 2024-10-02 NOTE — ANESTHESIA POSTPROCEDURE EVALUATION
Anesthesia Post Evaluation    Patient: Julianne Parker    Procedure(s) Performed: Procedure(s) (LRB):  Exam under anesthesia (N/A)  EXCISION, LESION, RECTUM, ANAL APPROACH (N/A)    Final Anesthesia Type: general      Patient location during evaluation: PACU  Patient participation: Yes- Able to Participate  Level of consciousness: awake and alert  Post-procedure vital signs: reviewed and stable  Pain management: adequate  Airway patency: patent  ILENE mitigation strategies: Multimodal analgesia  PONV status at discharge: No PONV  Anesthetic complications: no      Cardiovascular status: blood pressure returned to baseline and hemodynamically stable  Respiratory status: room air  Hydration status: euvolemic  Follow-up not needed.              Vitals Value Taken Time   /67 10/02/24 1001   Temp 36.4 °C (97.5 °F) 10/02/24 0931   Pulse 43 10/02/24 1001   Resp 16 10/02/24 1013   SpO2 94 % 10/02/24 1001         Event Time   Out of Recovery 09:27:18         Pain/Linnea Score: Pain Rating Prior to Med Admin: 7 (10/2/2024 10:13 AM)  Linnea Score: 10 (10/2/2024  9:31 AM)

## 2024-10-02 NOTE — INTERVAL H&P NOTE
The patient has been examined and the H&P has been reviewed:    I concur with the findings and no changes have occurred since H&P was written.    Surgery risks, benefits and alternative options discussed and understood by patient/family.          Active Hospital Problems    Diagnosis  POA    *Benign neuroendocrine tumor of rectum [D3A.8]  Yes      Resolved Hospital Problems   No resolved problems to display.

## 2024-10-02 NOTE — ANESTHESIA PREPROCEDURE EVALUATION
10/01/2024  Julianne Parker is a 64 y.o., female with neuroendocrine tumor of the rectum scheduled for EUA    -------------------------------------    Benign neuroendocrine tumor of rectum    Bradycardia    Essential (primary) hypertension    Hypothyroidism, unspecified    Menopause    Microscopic hematuria    Nontoxic single thyroid nodule    Sleep disorder not due to a substance or known physiological condition, unspecified       Presents for EUA    9/24/24 ECG    Test Reason : PREOP    Vent. Rate : 048 BPM     Atrial Rate : 048 BPM     P-R Int : 184 ms          QRS Dur : 080 ms      QT Int : 440 ms       P-R-T Axes : 039 010 049 degrees     QTc Int : 393 ms    Sinus bradycardia with Premature atrial complexes  Otherwise normal ECG  When compared with ECG of 26-MAY-2024 08:16,  Premature atrial complexes are now Present  Confirmed by Nicolas Sandoval MD ([id number]) on 9/26/2024 5:03:39 PM       Pre-op Assessment    I have reviewed the Patient Summary Reports.     I have reviewed the Nursing Notes.       Review of Systems  Cardiovascular:     Hypertension                                  Hypertension         Neurological:      Headaches      Dx of Headaches                           Endocrine:   Hypothyroidism              Physical Exam  General: Well nourished, Cooperative, Alert and Oriented    Airway:  Mallampati: II   Mouth Opening: Normal  TM Distance: Normal  Tongue: Normal  Neck ROM: Normal ROM    Dental:  Intact    Chest/Lungs:  Clear to auscultation, Normal Respiratory Rate    Heart:  Rate: Normal  Rhythm: Regular Rhythm        Anesthesia Plan  Type of Anesthesia, risks & benefits discussed:    Anesthesia Type: Gen Supraglottic Airway, Gen ETT  Intra-op Monitoring Plan: Standard ASA Monitors  Post Op Pain Control Plan: multimodal analgesia and IV/PO Opioids PRN  Induction:  IV  Airway Plan: Direct,  Post-Induction  Informed Consent: Informed consent signed with the Patient and all parties understand the risks and agree with anesthesia plan.  All questions answered.   ASA Score: 2    Ready For Surgery From Anesthesia Perspective.     .

## 2024-10-02 NOTE — ANESTHESIA PROCEDURE NOTES
Intubation    Date/Time: 10/2/2024 7:23 AM    Performed by: Leo Thomas CRNA  Authorized by: Leo Thomas CRNA    Intubation:     Induction:  Intravenous    Intubated:  Postinduction    Mask Ventilation:  Easy mask    Attempts:  1    Attempted By:  CRNA    Method of Intubation:  Direct    Blade:  Koenig 2    Laryngeal View Grade: Grade IIA - cords partially seen      Difficult Airway Encountered?: No      Complications:  None    Airway Device:  Oral endotracheal tube    Airway Device Size:  7.5    Style/Cuff Inflation:  Cuffed (inflated to minimal occlusive pressure)    Inflation Amount (mL):  6    Tube secured:  21    Secured at:  The lips    Placement Verified By:  Capnometry    Complicating Factors:  None    Findings Post-Intubation:  BS equal bilateral and atraumatic/condition of teeth unchanged

## 2024-10-02 NOTE — DISCHARGE INSTRUCTIONS
Lifting restrictions  Avoid heavy lifting and excessive straining    No driving for 24 hours and while taking narcotic pain medications     Constipation precautions:Drink plenty fluids. Miralax OTC as needed.     BLEEDING: if you experience uncontrollable bleeding, contact your doctor and go to ER.    NAUSEA: due to the anesthesia, you may experience nausea for up to 24 hours. If nausea and vomiting last longer, contact your doctor.     INFECTION:  watch for any signs or symptoms of infection such as chills, fever, redness or drainage at surgical site. Notify your doctor.     PAIN : take your pain medications as directed. If the pain medications are not helping, notify your doctor.

## 2024-10-03 LAB — PSYCHE PATHOLOGY RESULT: NORMAL

## 2024-10-03 NOTE — DISCHARGE SUMMARY
[facility] - Periop Services  Discharge Note  Short Stay    Procedure(s) (LRB):  Exam under anesthesia (N/A)  EXCISION, LESION, RECTUM, ANAL APPROACH (N/A)    OUTCOME: Patient tolerated treatment/procedure well without complication and is now ready for discharge.    DISPOSITION: Home or Self Care    FINAL DIAGNOSIS:  Benign neuroendocrine tumor of rectum    FOLLOWUP: In clinic    DISCHARGE INSTRUCTIONS:    Discharge Procedure Orders   Diet Adult Regular     Lifting restrictions   Order Comments: Avoid heavy lifting and excessive straining         Clinical Reference Documents Added to Patient Instructions         Document    HOW TO PREVENT SURGICAL SITE INFECTIONS (ENGLISH)    HYDROCODONE AND ACETAMINOPHEN, ADULT (ENGLISH)    RECTAL EXAM UNDER ANESTHESIA (ENGLISH)    SOFT DIET (ENGLISH)            TIME SPENT ON DISCHARGE: 15 minutes

## 2024-10-17 ENCOUNTER — OFFICE VISIT (OUTPATIENT)
Dept: SURGICAL ONCOLOGY | Facility: CLINIC | Age: 64
End: 2024-10-17
Payer: MEDICARE

## 2024-10-17 VITALS
BODY MASS INDEX: 22.76 KG/M2 | DIASTOLIC BLOOD PRESSURE: 79 MMHG | SYSTOLIC BLOOD PRESSURE: 126 MMHG | HEIGHT: 68 IN | HEART RATE: 58 BPM | WEIGHT: 150.19 LBS

## 2024-10-17 DIAGNOSIS — D3A.8 BENIGN NEUROENDOCRINE TUMOR OF RECTUM: Primary | ICD-10-CM

## 2024-10-17 PROCEDURE — 99999 PR PBB SHADOW E&M-EST. PATIENT-LVL III: CPT | Mod: PBBFAC,,, | Performed by: COLON & RECTAL SURGERY

## 2024-10-17 PROCEDURE — 4010F ACE/ARB THERAPY RXD/TAKEN: CPT | Mod: CPTII,S$GLB,, | Performed by: COLON & RECTAL SURGERY

## 2024-10-17 PROCEDURE — 3078F DIAST BP <80 MM HG: CPT | Mod: CPTII,S$GLB,, | Performed by: COLON & RECTAL SURGERY

## 2024-10-17 PROCEDURE — 1160F RVW MEDS BY RX/DR IN RCRD: CPT | Mod: CPTII,S$GLB,, | Performed by: COLON & RECTAL SURGERY

## 2024-10-17 PROCEDURE — 99024 POSTOP FOLLOW-UP VISIT: CPT | Mod: S$GLB,,, | Performed by: COLON & RECTAL SURGERY

## 2024-10-17 PROCEDURE — 3288F FALL RISK ASSESSMENT DOCD: CPT | Mod: CPTII,S$GLB,, | Performed by: COLON & RECTAL SURGERY

## 2024-10-17 PROCEDURE — 3074F SYST BP LT 130 MM HG: CPT | Mod: CPTII,S$GLB,, | Performed by: COLON & RECTAL SURGERY

## 2024-10-17 PROCEDURE — 1101F PT FALLS ASSESS-DOCD LE1/YR: CPT | Mod: CPTII,S$GLB,, | Performed by: COLON & RECTAL SURGERY

## 2024-10-17 PROCEDURE — 1159F MED LIST DOCD IN RCRD: CPT | Mod: CPTII,S$GLB,, | Performed by: COLON & RECTAL SURGERY

## 2024-10-17 PROCEDURE — 3044F HG A1C LEVEL LT 7.0%: CPT | Mod: CPTII,S$GLB,, | Performed by: COLON & RECTAL SURGERY

## 2024-10-17 NOTE — PROGRESS NOTES
"   Patient ID: [id number]     HPI:     Julianne Pakrer is a 64 y.o. female here today for a post op visit.  Doing well.  No complaints.  Having Bms.    Path - 0.2 cm T1a well-differentiated neuroendocrine tumor with negative margins    Current Outpatient Medications   Medication Instructions    amLODIPine (NORVASC) 10 mg, Oral, Daily    blood pressure monitor (BLOOD PRESSURE KIT) Kit Use daily to assess blood pressure    ceramides 1,3,6-II (CERAVE) Crea Apply as needed    HYDROcodone-acetaminophen (NORCO) 5-325 mg per tablet 1 tablet, Oral, Every 4 hours PRN    levothyroxine (SYNTHROID) 75 mcg, Oral, Before breakfast    losartan (COZAAR) 50 mg, Daily    meloxicam (MOBIC) 15 mg, Oral, Daily    vitamin D (VITAMIN D3) 2,000 Units, Daily       Patient is allergic to hydrochlorothiazide, hydrocortisone, lisinopril, sulfamethoxazole-trimethoprim, and hydrobenzthiazide.     Patient Care Team:  Poonam Appiah FNP as PCP - General (Family Medicine)       Objective:     Visit Vitals  /79 (BP Location: Left arm, Patient Position: Sitting)   Pulse (!) 58   Ht 5' 8" (1.727 m)   Wt 68.1 kg (150 lb 3.2 oz)   BMI 22.84 kg/m²       Physical Exam    General: Alert and oriented, No acute distress.  Head: Normocephalic, Atraumatic.  Eye: Sclera non-icteric.  Respiratory: Non-labored respirations, Symmetrical chest wall expansion.  Gastrointestinal: Soft, Non-distended. Non-tender.   Rectal: Not examined today.  Extremities: No lower extremity edema.  Integumentary: Warm, Dry, Intact.  Neurologic: No focal deficits.      Assessment:       ICD-10-CM ICD-9-CM   1. Benign neuroendocrine tumor of rectum  D3A.8 209.57        Plan:     - RTC 6 months for surveillance proctoscopy      No follow-ups on file. In addition to their scheduled follow up, the patient has also been instructed to follow up on as needed basis.     Future Appointments   Date Time Provider Department Center   12/19/2024  8:20 AM Poonam Appiah FNP [facility] " [facility]   4/17/2025 10:00 AM [facility]   7/15/2025  8:50 AM Gillian Lundberg FNP [facility]         Aditya Espinal MD

## 2024-11-11 ENCOUNTER — OFFICE VISIT (OUTPATIENT)
Dept: ORTHOPEDICS | Facility: CLINIC | Age: 64
End: 2024-11-11
Payer: MEDICARE

## 2024-11-11 ENCOUNTER — HOSPITAL ENCOUNTER (OUTPATIENT)
Dept: RADIOLOGY | Facility: CLINIC | Age: 64
Discharge: HOME | End: 2024-11-11
Attending: REHABILITATION UNIT
Payer: MEDICARE

## 2024-11-11 VITALS
WEIGHT: 150.13 LBS | HEIGHT: 68 IN | BODY MASS INDEX: 22.75 KG/M2 | DIASTOLIC BLOOD PRESSURE: 69 MMHG | HEART RATE: 52 BPM | SYSTOLIC BLOOD PRESSURE: 116 MMHG

## 2024-11-11 DIAGNOSIS — Z98.890 S/P ARTHROSCOPIC PARTIAL MEDIAL MENISCECTOMY OF RIGHT KNEE: Primary | ICD-10-CM

## 2024-11-11 DIAGNOSIS — Z98.890 S/P ARTHROSCOPIC PARTIAL MEDIAL MENISCECTOMY OF RIGHT KNEE: ICD-10-CM

## 2024-11-11 DIAGNOSIS — M17.11 PRIMARY OSTEOARTHRITIS OF RIGHT KNEE: ICD-10-CM

## 2024-11-11 DIAGNOSIS — Z87.828 S/P ARTHROSCOPIC PARTIAL MEDIAL MENISCECTOMY OF RIGHT KNEE: Primary | ICD-10-CM

## 2024-11-11 DIAGNOSIS — Z87.828 S/P ARTHROSCOPIC PARTIAL MEDIAL MENISCECTOMY OF RIGHT KNEE: ICD-10-CM

## 2024-11-11 PROCEDURE — 3044F HG A1C LEVEL LT 7.0%: CPT | Mod: CPTII,,, | Performed by: REHABILITATION UNIT

## 2024-11-11 PROCEDURE — 99213 OFFICE O/P EST LOW 20 MIN: CPT | Mod: ,,, | Performed by: REHABILITATION UNIT

## 2024-11-11 PROCEDURE — 3288F FALL RISK ASSESSMENT DOCD: CPT | Mod: CPTII,,, | Performed by: REHABILITATION UNIT

## 2024-11-11 PROCEDURE — 1160F RVW MEDS BY RX/DR IN RCRD: CPT | Mod: CPTII,,, | Performed by: REHABILITATION UNIT

## 2024-11-11 PROCEDURE — 3008F BODY MASS INDEX DOCD: CPT | Mod: CPTII,,, | Performed by: REHABILITATION UNIT

## 2024-11-11 PROCEDURE — 4010F ACE/ARB THERAPY RXD/TAKEN: CPT | Mod: CPTII,,, | Performed by: REHABILITATION UNIT

## 2024-11-11 PROCEDURE — 1159F MED LIST DOCD IN RCRD: CPT | Mod: CPTII,,, | Performed by: REHABILITATION UNIT

## 2024-11-11 PROCEDURE — 3074F SYST BP LT 130 MM HG: CPT | Mod: CPTII,,, | Performed by: REHABILITATION UNIT

## 2024-11-11 PROCEDURE — 73564 X-RAY EXAM KNEE 4 OR MORE: CPT | Mod: RT,,, | Performed by: REHABILITATION UNIT

## 2024-11-11 PROCEDURE — 3078F DIAST BP <80 MM HG: CPT | Mod: CPTII,,, | Performed by: REHABILITATION UNIT

## 2024-11-11 PROCEDURE — 1101F PT FALLS ASSESS-DOCD LE1/YR: CPT | Mod: CPTII,,, | Performed by: REHABILITATION UNIT

## 2024-11-11 NOTE — PROGRESS NOTES
Subjective:      Patient ID: Julianne Parker is a 64 y.o. female.    Chief Complaint: Follow-up (5wk sp Right knee menisectomy SX 2/22/24. Xr today. Doing well overall. Done with therapy. States pain and swelling to right knee will not reside. Some days worse than others.)    Date of procedure: 2/22/24     Procedure:  Right knee arthroscopy and partial Medial and Lateral meniscectomies       Julianne Parker returns to the clinic today for post-operative examination. Patient is s/p the above procedure.  Overall she is much improved from her preoperative state.  She has some occasional discomfort that responds well to anti-inflammatories.  She has some intermittent swelling.  She states that she has been experiencing pain for about the past month along her shin but denies pain throughout the knee. No trauma. Ambulating without device.  No sensory or motor changes distally.  No mechanical symptoms and no instability. She is pleased with her progress.     Comprehensive review of systems completed and negative except as per HPI.  Objective:     Vitals:    11/11/24 0902   BP: 116/69   Pulse: (!) 52       Gen: No acute distress    Right knee:  Portal sites healed without signs of infection or dehiscence. No effusion and mild swelling normal for postoperative timeframe.    Range of motion 0 to 125 degrees. Able to perform straight leg raise    Negative Surinder's  Well-perfused lower extremity with capillary refill less than 2 seconds  Sensation intact to light touch to tibial nerve, superficial and deep peroneal distributions.  5 out of 5 EHL/FHL, tibials anterior, and gastrocsoleus complex  Calf soft and easily compressible without clinical sign of DVT. No palpable popliteal lymphadenopathy.  No pain out of proportion to expectation. No excessive pain with passive stretch of muscles in any compartment. Temperature and color of extremity appropriate. Brisk capillary refill of digits. Compartments compressible without  evidence of excessive firmness.     Imaging:  Four view weight bearing radiographs of the right knee obtained today personally reviewed showing no acute fractures or dislocations. Degenerative changes throughout joint spaces. No gross malalignment.      Assessment:       Encounter Diagnoses   Name Primary?    S/P arthroscopic partial medial meniscectomy of right knee Yes    Primary osteoarthritis of right knee              Plan:       Julianne was seen today for follow-up.    Diagnoses and all orders for this visit:    S/P arthroscopic partial medial meniscectomy of right knee  -     X-Ray Knee Complete 4 Or More Views Right; Future    Primary osteoarthritis of right knee      Status post the above-stated procedure and doing well.   She is going to continue her home exercises for prolonged and sustained knee health  She does have a small amount of swelling on her knee which is to be expected given the degenerative changes in her knee.  Instructed her to take Mobic consistently for the next 7 days in hopes that this will help relieve her shin pain. If this does not help I informed her to call and I will send in a short course of steroids.   WBAT   All of the patient's questions and concerns were addressed during the visit. Patient will call or contact our office with any new issues or concerns.    Follow-up: Julianne A Keith to follow up as needed      Leo Saucedo MD personally performed the services described in this documentation, including but not limited to patient's history, physical examination, and assessment and plan of care. All medical record entries made by Yaneth Feliciano PA-C were performed at his direction and in his presence. The medical record was reviewed and is accurate and complete.

## 2024-12-12 ENCOUNTER — LAB VISIT (OUTPATIENT)
Dept: LAB | Facility: HOSPITAL | Age: 64
End: 2024-12-12
Attending: NURSE PRACTITIONER
Payer: MEDICARE

## 2024-12-12 DIAGNOSIS — I10 PRIMARY HYPERTENSION: ICD-10-CM

## 2024-12-12 LAB
ALBUMIN SERPL-MCNC: 3.9 G/DL (ref 3.4–4.8)
ALBUMIN/GLOB SERPL: 1.1 RATIO (ref 1.1–2)
ALP SERPL-CCNC: 82 UNIT/L (ref 40–150)
ALT SERPL-CCNC: 13 UNIT/L (ref 0–55)
ANION GAP SERPL CALC-SCNC: 8 MEQ/L
AST SERPL-CCNC: 20 UNIT/L (ref 5–34)
BASOPHILS # BLD AUTO: 0.02 X10(3)/MCL
BASOPHILS NFR BLD AUTO: 0.7 %
BILIRUB SERPL-MCNC: 0.3 MG/DL
BUN SERPL-MCNC: 10.6 MG/DL (ref 9.8–20.1)
CALCIUM SERPL-MCNC: 9.5 MG/DL (ref 8.4–10.2)
CHLORIDE SERPL-SCNC: 107 MMOL/L (ref 98–107)
CO2 SERPL-SCNC: 24 MMOL/L (ref 23–31)
CREAT SERPL-MCNC: 0.74 MG/DL (ref 0.55–1.02)
CREAT/UREA NIT SERPL: 14
EOSINOPHIL # BLD AUTO: 0.1 X10(3)/MCL (ref 0–0.9)
EOSINOPHIL NFR BLD AUTO: 3.5 %
ERYTHROCYTE [DISTWIDTH] IN BLOOD BY AUTOMATED COUNT: 13.5 % (ref 11.5–17)
GFR SERPLBLD CREATININE-BSD FMLA CKD-EPI: >60 ML/MIN/1.73/M2
GLOBULIN SER-MCNC: 3.6 GM/DL (ref 2.4–3.5)
GLUCOSE SERPL-MCNC: 77 MG/DL (ref 82–115)
HCT VFR BLD AUTO: 37.9 % (ref 37–47)
HGB BLD-MCNC: 12.6 G/DL (ref 12–16)
IMM GRANULOCYTES # BLD AUTO: 0.01 X10(3)/MCL (ref 0–0.04)
IMM GRANULOCYTES NFR BLD AUTO: 0.4 %
LYMPHOCYTES # BLD AUTO: 1.06 X10(3)/MCL (ref 0.6–4.6)
LYMPHOCYTES NFR BLD AUTO: 37.6 %
MCH RBC QN AUTO: 29.4 PG (ref 27–31)
MCHC RBC AUTO-ENTMCNC: 33.2 G/DL (ref 33–36)
MCV RBC AUTO: 88.3 FL (ref 80–94)
MONOCYTES # BLD AUTO: 0.3 X10(3)/MCL (ref 0.1–1.3)
MONOCYTES NFR BLD AUTO: 10.6 %
NEUTROPHILS # BLD AUTO: 1.33 X10(3)/MCL (ref 2.1–9.2)
NEUTROPHILS NFR BLD AUTO: 47.2 %
NRBC BLD AUTO-RTO: 0 %
PLATELET # BLD AUTO: 282 X10(3)/MCL (ref 130–400)
PMV BLD AUTO: 9 FL (ref 7.4–10.4)
POTASSIUM SERPL-SCNC: 4.1 MMOL/L (ref 3.5–5.1)
PROT SERPL-MCNC: 7.5 GM/DL (ref 5.8–7.6)
RBC # BLD AUTO: 4.29 X10(6)/MCL (ref 4.2–5.4)
SODIUM SERPL-SCNC: 139 MMOL/L (ref 136–145)
WBC # BLD AUTO: 2.82 X10(3)/MCL (ref 4.5–11.5)

## 2024-12-12 PROCEDURE — 85025 COMPLETE CBC W/AUTO DIFF WBC: CPT

## 2024-12-12 PROCEDURE — 80053 COMPREHEN METABOLIC PANEL: CPT

## 2024-12-12 PROCEDURE — 36415 COLL VENOUS BLD VENIPUNCTURE: CPT

## 2024-12-19 ENCOUNTER — OFFICE VISIT (OUTPATIENT)
Dept: INTERNAL MEDICINE | Facility: CLINIC | Age: 64
End: 2024-12-19
Payer: MEDICARE

## 2024-12-19 VITALS
WEIGHT: 155.19 LBS | BODY MASS INDEX: 23.52 KG/M2 | HEIGHT: 68 IN | OXYGEN SATURATION: 100 % | HEART RATE: 72 BPM | SYSTOLIC BLOOD PRESSURE: 138 MMHG | DIASTOLIC BLOOD PRESSURE: 72 MMHG | TEMPERATURE: 98 F | RESPIRATION RATE: 18 BRPM

## 2024-12-19 DIAGNOSIS — Z12.31 BREAST CANCER SCREENING BY MAMMOGRAM: ICD-10-CM

## 2024-12-19 DIAGNOSIS — R31.29 MICROSCOPIC HEMATURIA: ICD-10-CM

## 2024-12-19 DIAGNOSIS — D3A.8 BENIGN NEUROENDOCRINE TUMOR OF RECTUM: Primary | ICD-10-CM

## 2024-12-19 DIAGNOSIS — R00.1 BRADYCARDIA: ICD-10-CM

## 2024-12-19 DIAGNOSIS — E04.1 THYROID NODULE: ICD-10-CM

## 2024-12-19 DIAGNOSIS — E03.9 HYPOTHYROIDISM, UNSPECIFIED TYPE: ICD-10-CM

## 2024-12-19 DIAGNOSIS — I10 PRIMARY HYPERTENSION: ICD-10-CM

## 2024-12-19 DIAGNOSIS — E03.9 ACQUIRED HYPOTHYROIDISM: ICD-10-CM

## 2024-12-19 DIAGNOSIS — E78.00 HIGH CHOLESTEROL: ICD-10-CM

## 2024-12-19 PROCEDURE — 3078F DIAST BP <80 MM HG: CPT | Mod: CPTII,,, | Performed by: NURSE PRACTITIONER

## 2024-12-19 PROCEDURE — 3044F HG A1C LEVEL LT 7.0%: CPT | Mod: CPTII,,, | Performed by: NURSE PRACTITIONER

## 2024-12-19 PROCEDURE — 99214 OFFICE O/P EST MOD 30 MIN: CPT | Mod: S$PBB,,, | Performed by: NURSE PRACTITIONER

## 2024-12-19 PROCEDURE — 1101F PT FALLS ASSESS-DOCD LE1/YR: CPT | Mod: CPTII,,, | Performed by: NURSE PRACTITIONER

## 2024-12-19 PROCEDURE — 99215 OFFICE O/P EST HI 40 MIN: CPT | Mod: PBBFAC | Performed by: NURSE PRACTITIONER

## 2024-12-19 PROCEDURE — 3288F FALL RISK ASSESSMENT DOCD: CPT | Mod: CPTII,,, | Performed by: NURSE PRACTITIONER

## 2024-12-19 PROCEDURE — 3008F BODY MASS INDEX DOCD: CPT | Mod: CPTII,,, | Performed by: NURSE PRACTITIONER

## 2024-12-19 PROCEDURE — 4010F ACE/ARB THERAPY RXD/TAKEN: CPT | Mod: CPTII,,, | Performed by: NURSE PRACTITIONER

## 2024-12-19 PROCEDURE — 1160F RVW MEDS BY RX/DR IN RCRD: CPT | Mod: CPTII,,, | Performed by: NURSE PRACTITIONER

## 2024-12-19 PROCEDURE — 3075F SYST BP GE 130 - 139MM HG: CPT | Mod: CPTII,,, | Performed by: NURSE PRACTITIONER

## 2024-12-19 PROCEDURE — 1159F MED LIST DOCD IN RCRD: CPT | Mod: CPTII,,, | Performed by: NURSE PRACTITIONER

## 2024-12-19 RX ORDER — LEVOTHYROXINE SODIUM 75 UG/1
75 TABLET ORAL
Qty: 90 TABLET | Refills: 1 | Status: SHIPPED | OUTPATIENT
Start: 2024-12-19 | End: 2025-06-17

## 2024-12-19 NOTE — PROGRESS NOTES
Patient ID: [id number]     Chief Complaint: Follow-up (Lab results)        HPI:     HPI      Julianne Parker is a 64 y.o. female here today for a follow up.         Immunizations:   Immunization History   Administered Date(s) Administered    COVID-19 MRNA, LN-S PF (MODERNA HALF 0.25 ML DOSE) 11/17/2021    COVID-19 Vaccine 03/11/2021, 04/08/2021    COVID-19, MRNA, LN-S, PF (MODERNA FULL 0.5 ML DOSE) 03/11/2021, 04/08/2021    Td (ADULT) 02/19/1992, 05/01/2007    Tdap 12/30/2020        -------------------------------------    Benign neuroendocrine tumor of rectum    Bradycardia    Essential (primary) hypertension    Hypothyroidism, unspecified    Menopause    Microscopic hematuria    Nontoxic single thyroid nodule    Sleep disorder not due to a substance or known physiological condition, unspecified        Past Surgical History:   Procedure Laterality Date    COLONOSCOPY      EXAMINATION UNDER ANESTHESIA N/A 10/2/2024    Procedure: Exam under anesthesia;  Surgeon: Aditya Espinal MD;  Location: [facility] OR;  Service: Colon and Rectal;  Laterality: N/A;  POSSIBLE TRANSANAL EXCISION    PLEASE HAVE SCOTTY STORZ EQUIPMENT, TAMIS EQUIPMENT AND PROCTOSCOPE AVAILABLE //  XX    KNEE ARTHROSCOPY W/ MENISCECTOMY Right 02/22/2024    Procedure: ARTHROSCOPY, KNEE, WITH MENISCECTOMY;  Surgeon: Leo Saucedo MD;  Location: [facility] OR;  Service: Orthopedics;  Laterality: Right;    NO PAST SURGERIES      TRANSANAL RECTAL RESECTION N/A 10/2/2024    Procedure: EXCISION, LESION, RECTUM, ANAL APPROACH;  Surgeon: Aditya Espinal MD;  Location: [facility] OR;  Service: Colon and Rectal;  Laterality: N/A;  transanal excision       Review of patient's allergies indicates:   Allergen Reactions    Hydrochlorothiazide      Other reaction(s): rash  Rash    Hydrocortisone Other (See Comments)    Lisinopril Edema    Sulfamethoxazole-trimethoprim Other (See Comments)    Hydrobenzthiazide Rash       Current Outpatient Medications   Medication  Instructions    amLODIPine (NORVASC) 10 mg, Oral, Daily    blood pressure monitor (BLOOD PRESSURE KIT) Kit Use daily to assess blood pressure    ceramides 1,3,6-II (CERAVE) Crea Apply as needed    HYDROcodone-acetaminophen (NORCO) 5-325 mg per tablet 1 tablet, Oral, Every 4 hours PRN    levothyroxine (SYNTHROID) 75 mcg, Oral, Before breakfast    losartan (COZAAR) 50 mg, Daily    meloxicam (MOBIC) 15 mg, Oral, Daily    vitamin D (VITAMIN D3) 2,000 Units, Daily       Social History     Socioeconomic History    Marital status:      Spouse name: Manas    Number of children: 2   Tobacco Use    Smoking status: Never     Passive exposure: Past    Smokeless tobacco: Never   Substance and Sexual Activity    Alcohol use: Never    Drug use: Never    Sexual activity: Yes     Partners: Male   Social History Narrative    ** Merged History Encounter **          Social Drivers of Health     Financial Resource Strain: Low Risk  (12/19/2024)    Overall Financial Resource Strain (CARDIA)     Difficulty of Paying Living Expenses: Not hard at all   Food Insecurity: No Food Insecurity (9/18/2024)    Hunger Vital Sign     Worried About Running Out of Food in the Last Year: Never true     Ran Out of Food in the Last Year: Never true   Transportation Needs: No Transportation Needs (9/18/2024)    TRANSPORTATION NEEDS     Transportation : No   Physical Activity: Insufficiently Active (9/18/2024)    Exercise Vital Sign     Days of Exercise per Week: 4 days     Minutes of Exercise per Session: 30 min   Stress: No Stress Concern Present (9/18/2024)    [employer] - Occupational Stress Questionnaire     Feeling of Stress : Not at all   Housing Stability: Low Risk  (9/18/2024)    Housing Stability Vital Sign     Unable to Pay for Housing in the Last Year: No     Homeless in the Last Year: No        Family History   Problem Relation Name Age of Onset    Diabetes type II Mother      Hypertension Mother       "Hypertension Father          Patient Care Team:  Poonam Appiah FNP as PCP - General (Family Medicine)     Subjective:     Review of Systems     See HPI for details    Constitutional: Denies Change in appetite. Denies Chills. Denies Fever. Denies Night sweats.  Eye: Denies Blurred vision. Denies Discharge. Denies Eye pain.  ENT: Denies Decreased hearing. Denies Sore throat. Denies Swollen glands.  Respiratory: Denies Cough. Denies Shortness of breath. Denies Shortness of breath with exertion. Denies Wheezing.  Cardiovascular: DeniesChest pain at rest. Denies Chest pain with exertion. Denies Irregular heartbeat. Denies Palpitations. Denies Edema.  Gastrointestinal: Denies Abdominal pain. DeniesDiarrhea. Denies Nausea. Denies Vomiting. Denies Hematemesis or Hematochezia.  Genitourinary: Denies Dysuria. Denies Urinary frequency. Denies Urinary urgency. Denies Blood in urine.  Endocrine: Denies Cold intolerance. Denies Excessive thirst. Denies Heat intolerance. Denies Weight loss. Denies Weight gain.  Musculoskeletal: Denies Painful joints. Denies Weakness.  Integumentary: Denies Rash. Denies Itching. Denies Dry skin.  Neurologic: Denies Dizziness. Denies Fainting. Denies Headache.  Psychiatric: Denies Depression. Denies Anxiety. Denies Suicidal/Homicidal ideations.    All Other ROS: Negative except as stated in HPI.       Objective:     Visit Vitals  /72 (Patient Position: Sitting)   Pulse 72   Temp 97.6 °F (36.4 °C) (Oral)   Resp 18   Ht 5' 8" (1.727 m)   Wt 70.4 kg (155 lb 3.2 oz)   SpO2 100%   BMI 23.60 kg/m²       Physical Exam    General: Alert and oriented, No acute distress.  Head: Normocephalic, Atraumatic.  Eye: Pupils are equal, round and reactive to light, Extraocular movements are intact, Sclera non-icteric.  Ears/Nose/Throat: Normal, Mucosa moist,Clear.  Neck/Thyroid: Supple, Non-tender, No carotid bruit, No lymphadenopathy, No JVD, Full range of motion.  Respiratory: Clear to auscultation " bilaterally; No wheezes, rales or rhonchi,Non-labored respirations, Symmetrical chest wall expansion.  Cardiovascular: Regular rate and rhythm, S1/S2 normal, No murmurs, rubs or gallops.  Gastrointestinal: Soft, Non-tender, Non-distended, Normal bowel sounds, No palpable organomegaly.  Musculoskeletal: Normal range of motion.  Integumentary: Warm, Dry, Intact, No suspicious lesions or rashes.  Extremities: No clubbing, cyanosis or edema  Neurologic: No focal deficits, Cranial Nerves II-XII are grossly intact, Motor strength normal upper and lower extremities, Sensory exam intact.  Psychiatric: Normal interaction, Coherent speech, Euthymic mood, Appropriate affect       Labs Reviewed:     Chemistry:  Lab Results   Component Value Date     12/12/2024    K 4.1 12/12/2024    BUN 10.6 12/12/2024    CREATININE 0.74 12/12/2024    EGFRNORACEVR >60 12/12/2024    GLUCOSE 77 (L) 12/12/2024    CALCIUM 9.5 12/12/2024    ALKPHOS 82 12/12/2024    LABPROT 7.5 12/12/2024    ALBUMIN 3.9 12/12/2024    BILIDIR 0.2 12/03/2021    IBILI 0.20 12/03/2021    AST 20 12/12/2024    ALT 13 12/12/2024    MG 2.30 05/26/2024    AGIICQEO01YI 57.3 08/30/2023        Lab Results   Component Value Date    HGBA1C 5.6 03/14/2024        Hematology:  Lab Results   Component Value Date    WBC 2.82 (L) 12/12/2024    HGB 12.6 12/12/2024    HCT 37.9 12/12/2024     12/12/2024       Lipid Panel:  Lab Results   Component Value Date    CHOL 191 03/14/2024    HDL 64 (H) 03/14/2024    .00 03/14/2024    TRIG 43 03/14/2024    TOTALCHOLEST 3 03/14/2024        Urine:  Lab Results   Component Value Date    APPEARANCEUA Clear 09/17/2024    SGUA 1.016 09/17/2024    PROTEINUA Negative 09/17/2024    KETONESUA Negative 09/17/2024    LEUKOCYTESUR Negative 09/17/2024    RBCUA 0-5 09/17/2024    WBCUA 0-5 09/17/2024    BACTERIA None Seen 09/17/2024    SQEPUA Occ (A) 09/17/2024    HYALINECASTS None Seen 09/17/2024        Assessment:       ICD-10-CM ICD-9-CM   1.  Benign neuroendocrine tumor of rectum  D3A.8 209.57   2. Primary hypertension  I10 401.9   3. Acquired hypothyroidism  E03.9 244.9   4. Bradycardia  R00.1 427.89   5. Microscopic hematuria  R31.29 599.72   6. High cholesterol  E78.00 272.0   7. Thyroid nodule  E04.1 241.0   8. Breast cancer screening by mammogram  Z12.31 V76.12   9. Hypothyroidism, unspecified type  E03.9 244.9        Plan:     1. Benign neuroendocrine tumor of rectum (Primary)  Pt had colonoscopy 7-19-24 showing polyps X 2. Pathology of polyp showed Benign neuroendocrine tumor of rectum- recommend repeat proctoscopy in 6 months- due Jan 2025. Pt followed with Surg Onc for further management. Keep appt 4-17-25.     2. Primary hypertension  Low fat low salt diet and exercise. Cont Amlodipine as prescribed.     3. Acquired hypothyroidism  TSH WNL. Cont Levothyroxine as prescribed.     4. Bradycardia  Pt followed [facility]. Keep appts.     5. Microscopic hematuria  Blood persists in urine. Pt followed in Uro- last seen 6-7-23 after cystoscope and RTC in 6 months. Pt had appt scheduled 12-6-23 but NS. Refer back to Uro cl for further mgmt.     6. High cholesterol  FLP WNL. Low fat diet and exercise.     7. Thyroid nodule  Thyroid US done 10-31-22:  US Thyroid  Order: [id number]  Status: Final result       Visible to patient: No (inaccessible in Patient Portal)       Next appt: 04/10/2024 at 09:00 AM in Orthopedics (Leo Saucedo MD)       Dx: Thyroid nodule    0 Result Notes       1 Follow-up Encounter  Details     Reading Physician Reading Date Result Priority   Shola Waldron MD  [phone] 10/31/2022 Routine      Narrative & Impression  EXAMINATION:  US THYROID     CLINICAL HISTORY:  , Nontoxic single thyroid nodule.     TECHNIQUE:  Multiple transverse and sagittal grayscale sonographic images were acquired through the thyroid gland.     FINDINGS:  Right hemithyroid measures 3.4 x 1.4 x 1.6, left joel thyroid  measures 3.5 x 1.4 x 1.2 isthmus measures 3.1 mm in thickness.     Within the right joel thyroid there is a 7 mm x 6 mm x 7 mm subcentimeter nodule and within the left joel thyroid there is a 2 mm x 1 mm x 2 mm subcentimeter nodule none of these nodules meets criteria for biopsy and or surveillance.     No other focal abnormalities identified     Impression:     Subcentimeter nodules in the right and left joel thyroid as above        Electronically signed by: Shola Waldron  Date:                                            10/31/2022  Time:                                           14:18             Exam Ended: 10/31/22 10:45 CDT Last Resulted: 10/31/22 14:18 CDT            Sub-centimeter nodules not recommended for surveillance or biopsy per radiology recommendation.    8. Breast cancer screening by mammogram  UTD MMG.         Follow up in about 3 months (around 3/19/2025) for with labs 1 week prior to appt. . In addition to their scheduled follow up, the patient has also been instructed to follow up on as needed basis.     Future Appointments   Date Time Provider Department Center   4/17/2025 10:00 AM [facility]   7/15/2025  8:50 AM Gillian Lundberg FNP [facility]        BERTHA Cooper

## 2025-01-24 ENCOUNTER — TELEPHONE (OUTPATIENT)
Dept: INTERNAL MEDICINE | Facility: CLINIC | Age: 65
End: 2025-01-24
Payer: MEDICARE

## 2025-01-24 NOTE — TELEPHONE ENCOUNTER
Informed patient it was ok to take medication as prescribed with her vitamins. Patient verbalized understanding.

## 2025-02-10 ENCOUNTER — OFFICE VISIT (OUTPATIENT)
Dept: UROLOGY | Facility: CLINIC | Age: 65
End: 2025-02-10
Payer: MEDICARE

## 2025-02-10 VITALS
WEIGHT: 156.38 LBS | DIASTOLIC BLOOD PRESSURE: 56 MMHG | OXYGEN SATURATION: 100 % | SYSTOLIC BLOOD PRESSURE: 126 MMHG | TEMPERATURE: 98 F | HEIGHT: 68 IN | RESPIRATION RATE: 20 BRPM | HEART RATE: 48 BPM | BODY MASS INDEX: 23.7 KG/M2

## 2025-02-10 DIAGNOSIS — R31.29 MICROSCOPIC HEMATURIA: ICD-10-CM

## 2025-02-10 LAB
BILIRUB SERPL-MCNC: NEGATIVE MG/DL
BLOOD URINE, POC: NORMAL
COLOR, POC UA: YELLOW
GLUCOSE UR QL STRIP: NEGATIVE
KETONES UR QL STRIP: NEGATIVE
LEUKOCYTE ESTERASE URINE, POC: NEGATIVE
NITRITE, POC UA: NEGATIVE
PH, POC UA: 5.5
PROTEIN, POC: NEGATIVE
SPECIFIC GRAVITY, POC UA: 1.03
UROBILINOGEN, POC UA: 0.2

## 2025-02-10 PROCEDURE — 99213 OFFICE O/P EST LOW 20 MIN: CPT | Mod: S$PBB,,, | Performed by: NURSE PRACTITIONER

## 2025-02-10 PROCEDURE — 1159F MED LIST DOCD IN RCRD: CPT | Mod: CPTII,,, | Performed by: NURSE PRACTITIONER

## 2025-02-10 PROCEDURE — 99214 OFFICE O/P EST MOD 30 MIN: CPT | Mod: PBBFAC | Performed by: NURSE PRACTITIONER

## 2025-02-10 PROCEDURE — 1101F PT FALLS ASSESS-DOCD LE1/YR: CPT | Mod: CPTII,,, | Performed by: NURSE PRACTITIONER

## 2025-02-10 PROCEDURE — 3074F SYST BP LT 130 MM HG: CPT | Mod: CPTII,,, | Performed by: NURSE PRACTITIONER

## 2025-02-10 PROCEDURE — 3078F DIAST BP <80 MM HG: CPT | Mod: CPTII,,, | Performed by: NURSE PRACTITIONER

## 2025-02-10 PROCEDURE — 1160F RVW MEDS BY RX/DR IN RCRD: CPT | Mod: CPTII,,, | Performed by: NURSE PRACTITIONER

## 2025-02-10 PROCEDURE — 3008F BODY MASS INDEX DOCD: CPT | Mod: CPTII,,, | Performed by: NURSE PRACTITIONER

## 2025-02-10 PROCEDURE — 1126F AMNT PAIN NOTED NONE PRSNT: CPT | Mod: CPTII,,, | Performed by: NURSE PRACTITIONER

## 2025-02-10 PROCEDURE — 81001 URINALYSIS AUTO W/SCOPE: CPT | Mod: PBBFAC | Performed by: NURSE PRACTITIONER

## 2025-02-10 PROCEDURE — 88108 CYTOPATH CONCENTRATE TECH: CPT | Mod: TC | Performed by: NURSE PRACTITIONER

## 2025-02-10 PROCEDURE — 3288F FALL RISK ASSESSMENT DOCD: CPT | Mod: CPTII,,, | Performed by: NURSE PRACTITIONER

## 2025-02-10 NOTE — PROGRESS NOTES
Chief Complaint:   Chief Complaint   Patient presents with    [facility] [place]       HPI:   Patient is a 65 y.o. female here for cystoscopy for hematuria.  Patient has a history of microscopic hematuria and had a negative workup approximally three years ago.  Patient's most recent cystoscope on 06/07/2023, revealed negative results with Dr. Hills.   Hematuria has persisted today on urine specimen with 2-4 RBCs per HPF.    Today patient denies any urinary complaints.  She has no tobacco use history.  She denies gross hematuria.      Allergies:  Review of patient's allergies indicates:   Allergen Reactions    Hydrochlorothiazide      Other reaction(s): rash  Rash    Hydrocortisone Other (See Comments)    Lisinopril Edema    Sulfamethoxazole-trimethoprim Other (See Comments)    Hydrobenzthiazide Rash       Medications:  Current Outpatient Medications   Medication Sig Dispense Refill    amLODIPine (NORVASC) 10 MG tablet Take 1 tablet (10 mg total) by mouth once daily. 90 tablet 3    blood pressure monitor (BLOOD PRESSURE KIT) Kit Use daily to assess blood pressure 1 each 0    levothyroxine (SYNTHROID) 75 MCG tablet Take 1 tablet (75 mcg total) by mouth before breakfast. 90 tablet 1    losartan (COZAAR) 50 MG tablet Take 50 mg by mouth once daily.      meloxicam (MOBIC) 15 MG tablet Take 1 tablet (15 mg total) by mouth once daily. 30 tablet 2    vitamin D (VITAMIN D3) 1000 units Tab Take 2,000 Units by mouth once daily.      ceramides 1,3,6-II (CERAVE) Crea Apply as needed (Patient not taking: Reported on 12/19/2024) 453 g 0    HYDROcodone-acetaminophen (NORCO) 5-325 mg per tablet Take 1 tablet by mouth every 4 (four) hours as needed for Pain. (Patient not taking: Reported on 11/11/2024) 20 tablet 0     No current facility-administered medications for this visit.       Review of Systems:  General: No fever, chills, fatigability, or weight loss.  Skin: No rashes, itching, or changes in color or texture of skin.  Chest: Denies CORLEY,  cyanosis, wheezing, cough, and sputum production.  Abdomen: Appetite fine. No weight loss. Denies diarrhea, abdominal pain, hematemesis, or blood in stool.  Musculoskeletal: No joint stiffness or swelling. Denies back pain.  : As above.  All other review of systems negative.    PMH:  Past Medical History:   Diagnosis Date    Benign neuroendocrine tumor of rectum     Bradycardia     Essential (primary) hypertension     Hypothyroidism, unspecified     Menopause     Microscopic hematuria     Nontoxic single thyroid nodule     Sleep disorder not due to a substance or known physiological condition, unspecified        PSH:  Past Surgical History:   Procedure Laterality Date    COLONOSCOPY      EXAMINATION UNDER ANESTHESIA N/A 10/2/2024    Procedure: Exam under anesthesia;  Surgeon: Aditya Espinal MD;  Location: [facility] OR;  Service: Colon and Rectal;  Laterality: N/A;  POSSIBLE TRANSANAL EXCISION    PLEASE HAVE SCOTTY STORZ EQUIPMENT, TAMIS EQUIPMENT AND PROCTOSCOPE AVAILABLE //  XX    KNEE ARTHROSCOPY W/ MENISCECTOMY Right 02/22/2024    Procedure: ARTHROSCOPY, KNEE, WITH MENISCECTOMY;  Surgeon: Leo Saucedo MD;  Location: [facility] OR;  Service: Orthopedics;  Laterality: Right;    NO PAST SURGERIES      TRANSANAL RECTAL RESECTION N/A 10/2/2024    Procedure: EXCISION, LESION, RECTUM, ANAL APPROACH;  Surgeon: Aditya Espinal MD;  Location: [facility] OR;  Service: Colon and Rectal;  Laterality: N/A;  transanal excision       FamHx:  Family History   Problem Relation Name Age of Onset    Diabetes type II Mother      Hypertension Mother      Hypertension Father         SocHx:  Social History     Socioeconomic History    Marital status:      Spouse name: Manas    Number of children: 2   Tobacco Use    Smoking status: Never     Passive exposure: Current    Smokeless tobacco: Never   Substance and Sexual Activity    Alcohol use: Never    Drug use: Never    Sexual activity: Yes     Partners: Male   Social History  Narrative    ** Merged History Encounter **          Social Drivers of Health     Financial Resource Strain: Low Risk  (12/19/2024)    Overall Financial Resource Strain (CARDIA)     Difficulty of Paying Living Expenses: Not hard at all   Food Insecurity: No Food Insecurity (9/18/2024)    Hunger Vital Sign     Worried About Running Out of Food in the Last Year: Never true     Ran Out of Food in the Last Year: Never true   Transportation Needs: No Transportation Needs (9/18/2024)    TRANSPORTATION NEEDS     Transportation : No   Physical Activity: Insufficiently Active (9/18/2024)    Exercise Vital Sign     Days of Exercise per Week: 4 days     Minutes of Exercise per Session: 30 min   Stress: No Stress Concern Present (9/18/2024)    [employer] [place] [employer] - Occupational Stress Questionnaire     Feeling of Stress : Not at all   Housing Stability: Low Risk  (9/18/2024)    Housing Stability Vital Sign     Unable to Pay for Housing in the Last Year: No     Homeless in the Last Year: No       Physical Exam:  Vitals:    02/10/25 0800   BP: (!) 126/56   Pulse: (!) 48   Resp: 20   Temp: 97.5 °F (36.4 °C)     General: A&Ox3, no apparent distress, no deformities  Neck: No masses, normal thyroid  Lungs: CTA rose mary, no use of accessory muscles  Heart: RRR, no arrhythmias  Abdomen: Soft, NT, ND, no masses, no hernias, no hepatosplenomegaly  Lymphatic: Neck and groin nodes negative  Skin: The skin is warm and dry. No jaundice.  Ext: No c/c/e.         Labs:         Urinalysis:  Results for orders placed or performed in visit on 02/10/25   POCT URINE DIPSTICK WITH MICROSCOPE, AUTOMATED   Result Value Ref Range    Color, UA Yellow     Spec Grav UA 1.030     pH, UA 5.5     WBC, UA Negative     Nitrite, UA Negative     Protein, POC Negative     Glucose, UA Negative     Ketones, UA Negative     Urobilinogen, UA 0.2     Bilirubin, POC Negative     Blood, UA Small    Microscopic urinalysis reveals small RBCs 2-4 per HPF  negative WBCs negative bacteria negative nitrites epithelial cells 2-3 per HPF.        Impression:  1. Microscopic hematuria  - Ambulatory referral/consult to Urology  - POCT URINE DIPSTICK WITH MICROSCOPE, AUTOMATED      Plan:  Instructed patient to repeat urine cytology will notify patient on virtual visit in 8 days.  Instructed patient on timed voiding every 2-3 hrs, double voiding, avoidance of bladder irritants such as alcohol, citrus foods, chocolate, caffeinated drinks, energy drinks, spicy foods, sugar, caffeine products, sodas. Instructed patient to avoid drinking fluids 1-2 hours prior to bedtime & void immediately before bedtime.   RTC 6 months with urine cytology.  Instructed patient if develops any abnormal urologic symptoms notify clinic to be re-evaluate treated or during after hours go to emergency room versus urgent here.                           [facility]

## 2025-02-10 NOTE — PROGRESS NOTES
Placed in room. Seen by Patrick Martini NP. Spoke with patient.  Urine sent to lab for cytology. F/U 8 days virtual cytology. RTC in 6 months F/U.

## 2025-02-18 ENCOUNTER — OFFICE VISIT (OUTPATIENT)
Dept: UROLOGY | Facility: CLINIC | Age: 65
End: 2025-02-18
Payer: MEDICARE

## 2025-02-18 DIAGNOSIS — R31.29 MICROSCOPIC HEMATURIA: Primary | ICD-10-CM

## 2025-02-18 NOTE — PROGRESS NOTES
Established Patient - Audio Only Telehealth Visit     The patient location is:  Home  The chief complaint leading to consultation is:  Urine cytology  Visit type: Virtual visit with audio only (telephone)  Total time spent with patient:  20  minutes  Total time spent with medical decision with patient 15 minutes     The reason for the audio only service rather than synchronous audio virtual visit was related to technical difficulties or patient preference/necessity.     Each patient to whom I provide medical services by telemedicine is:  (1) informed of the relationship between the physician and patient and the respective role of any other health care provider with respect to management of the patient; and (2) notified that they may decline to receive medical services by telemedicine and may withdraw from such care at any time. Patient verbally consented to receive this service via voice-only telephone call.     This service was not originating from a related E/M service provided within the previous 7 days nor will  to an E/M service or procedure within the next 24 hours or my soonest available appointment.  Prevailing standard of care was able to be met in this audio-only visit.  Chief Complaint:  hematuria    HPI:   Patient is a 65 y.o. female here for audio virtual visit for hematuria to discuss urine cytology results.  Patient has a history of microscopic hematuria and had a negative workup approximally three years ago.  Patient's most recent cystoscope on 06/07/2023, revealed negative results with Dr. Hills.   Hematuria has persisted today on urine specimen with 2-4 RBCs per HPF.    Discuss results of urine cytology negative for malignancy and negative urethral carcinoma.  Instructed patient to keep follow up appointment in 6 months with urine cytology repeated.      Allergies:  Review of patient's allergies indicates:   Allergen Reactions    Hydrochlorothiazide      Other reaction(s): rash  Rash     Hydrocortisone Other (See Comments)    Lisinopril Edema    Sulfamethoxazole-trimethoprim Other (See Comments)    Hydrobenzthiazide Rash       Medications:  Current Medications[1]    Review of Systems:  General: No fever, chills, fatigability, or weight loss.  Skin: No rashes, itching, or changes in color or texture of skin.  Chest: Denies CORLEY, cyanosis, wheezing, cough, and sputum production.  Abdomen: Appetite fine. No weight loss. Denies diarrhea, abdominal pain, hematemesis, or blood in stool.  Musculoskeletal: No joint stiffness or swelling. Denies back pain.  : As above.  All other review of systems negative.    PMH:  Past Medical History:   Diagnosis Date    Benign neuroendocrine tumor of rectum     Bradycardia     Essential (primary) hypertension     Hypothyroidism, unspecified     Menopause     Microscopic hematuria     Nontoxic single thyroid nodule     Sleep disorder not due to a substance or known physiological condition, unspecified        PSH:  Past Surgical History:   Procedure Laterality Date    COLONOSCOPY      EXAMINATION UNDER ANESTHESIA N/A 10/2/2024    Procedure: Exam under anesthesia;  Surgeon: Aditya Espinal MD;  Location: [facility] OR;  Service: Colon and Rectal;  Laterality: N/A;  POSSIBLE TRANSANAL EXCISION    PLEASE HAVE SCOTTY STORZ EQUIPMENT, TAMIS EQUIPMENT AND PROCTOSCOPE AVAILABLE //  XX    KNEE ARTHROSCOPY W/ MENISCECTOMY Right 02/22/2024    Procedure: ARTHROSCOPY, KNEE, WITH MENISCECTOMY;  Surgeon: Leo Saucedo MD;  Location: [facility] OR;  Service: Orthopedics;  Laterality: Right;    NO PAST SURGERIES      TRANSANAL RECTAL RESECTION N/A 10/2/2024    Procedure: EXCISION, LESION, RECTUM, ANAL APPROACH;  Surgeon: Aditya Espinal MD;  Location: [facility] OR;  Service: Colon and Rectal;  Laterality: N/A;  transanal excision       FamHx:  Family History   Problem Relation Name Age of Onset    Diabetes type II Mother      Hypertension Mother      Hypertension Father         SocHx:  Social  History[2]    Physical Exam:  There were no vitals filed for this visit.    Labs:   Final Diagnosis      Urine, clean catch:  - Negative for malignancy.        Electronically signed by Carmen Thompson MD on 2/12/2025 at 1430   Gross Description    1. Urine, Clean Catch:   Received fresh is 10 ml of clear yellow fluid submitted for cytospin preparation.    Specimen 1 Interpretation   Negative for high grade urothelial carcinoma   Electronically signed by Carmen Thompson MD on 2/12/2025 at 1430       Impression:  There are no diagnoses linked to this encounter.    Plan:  Instructed patient to RTC 6 months with urine cytology.  Instructed patient if develops any abnormal urologic symptoms notify clinic to be re-evaluate treated or during after hours go to emergency room versus urgent here.                           [facility]         [1]   Current Outpatient Medications   Medication Sig Dispense Refill    amLODIPine (NORVASC) 10 MG tablet Take 1 tablet (10 mg total) by mouth once daily. 90 tablet 3    blood pressure monitor (BLOOD PRESSURE KIT) Kit Use daily to assess blood pressure 1 each 0    ceramides 1,3,6-II (CERAVE) Crea Apply as needed (Patient not taking: Reported on 12/19/2024) 453 g 0    HYDROcodone-acetaminophen (NORCO) 5-325 mg per tablet Take 1 tablet by mouth every 4 (four) hours as needed for Pain. (Patient not taking: Reported on 11/11/2024) 20 tablet 0    levothyroxine (SYNTHROID) 75 MCG tablet Take 1 tablet (75 mcg total) by mouth before breakfast. 90 tablet 1    losartan (COZAAR) 50 MG tablet Take 50 mg by mouth once daily.      meloxicam (MOBIC) 15 MG tablet Take 1 tablet (15 mg total) by mouth once daily. 30 tablet 2    vitamin D (VITAMIN D3) 1000 units Tab Take 2,000 Units by mouth once daily.       No current facility-administered medications for this visit.   [2]   Social History  Socioeconomic History    Marital status:      Spouse name: Manas    Number of children: 2   Tobacco Use     Smoking status: Never     Passive exposure: Current    Smokeless tobacco: Never   Substance and Sexual Activity    Alcohol use: Never    Drug use: Never    Sexual activity: Yes     Partners: Male   Social History Narrative    ** Merged History Encounter **          Social Drivers of Health     Financial Resource Strain: Low Risk  (12/19/2024)    Overall Financial Resource Strain (CARDIA)     Difficulty of Paying Living Expenses: Not hard at all   Food Insecurity: No Food Insecurity (9/18/2024)    Hunger Vital Sign     Worried About Running Out of Food in the Last Year: Never true     Ran Out of Food in the Last Year: Never true   Transportation Needs: No Transportation Needs (9/18/2024)    TRANSPORTATION NEEDS     Transportation : No   Physical Activity: Insufficiently Active (9/18/2024)    Exercise Vital Sign     Days of Exercise per Week: 4 days     Minutes of Exercise per Session: 30 min   Stress: No Stress Concern Present (9/18/2024)    [employer] [place] [employer] - Occupational Stress Questionnaire     Feeling of Stress : Not at all   Housing Stability: Unknown (9/18/2024)    Housing Stability Vital Sign     Unable to Pay for Housing in the Last Year: No     Homeless in the Last Year: No

## 2025-02-28 ENCOUNTER — TELEPHONE (OUTPATIENT)
Dept: ORTHOPEDICS | Facility: CLINIC | Age: 65
End: 2025-02-28
Payer: MEDICARE

## 2025-02-28 DIAGNOSIS — Z98.890 S/P ARTHROSCOPIC PARTIAL MEDIAL MENISCECTOMY OF RIGHT KNEE: Primary | ICD-10-CM

## 2025-02-28 DIAGNOSIS — Z87.828 S/P ARTHROSCOPIC PARTIAL MEDIAL MENISCECTOMY OF RIGHT KNEE: Primary | ICD-10-CM

## 2025-02-28 RX ORDER — MELOXICAM 15 MG/1
15 TABLET ORAL DAILY
Qty: 30 TABLET | Refills: 2 | Status: SHIPPED | OUTPATIENT
Start: 2025-02-28

## 2025-04-07 ENCOUNTER — HOSPITAL ENCOUNTER (OUTPATIENT)
Dept: RADIOLOGY | Facility: HOSPITAL | Age: 65
Discharge: HOME | End: 2025-04-07
Payer: MEDICARE

## 2025-04-07 DIAGNOSIS — Z12.31 ENCOUNTER FOR SCREENING MAMMOGRAM FOR BREAST CANCER: ICD-10-CM

## 2025-04-07 PROCEDURE — 77067 SCR MAMMO BI INCL CAD: CPT | Mod: TC

## 2025-04-07 PROCEDURE — 77067 SCR MAMMO BI INCL CAD: CPT | Mod: 26,,, | Performed by: RADIOLOGY

## 2025-04-07 PROCEDURE — 77063 BREAST TOMOSYNTHESIS BI: CPT | Mod: 26,,, | Performed by: RADIOLOGY

## 2025-04-17 ENCOUNTER — OFFICE VISIT (OUTPATIENT)
Dept: SURGICAL ONCOLOGY | Facility: CLINIC | Age: 65
End: 2025-04-17
Payer: MEDICARE

## 2025-04-17 VITALS
DIASTOLIC BLOOD PRESSURE: 67 MMHG | HEIGHT: 67 IN | HEART RATE: 48 BPM | WEIGHT: 157 LBS | SYSTOLIC BLOOD PRESSURE: 113 MMHG | BODY MASS INDEX: 24.64 KG/M2

## 2025-04-17 DIAGNOSIS — D3A.8 BENIGN NEUROENDOCRINE TUMOR OF RECTUM: Primary | ICD-10-CM

## 2025-04-17 PROCEDURE — 1160F RVW MEDS BY RX/DR IN RCRD: CPT | Mod: CPTII,S$GLB,, | Performed by: COLON & RECTAL SURGERY

## 2025-04-17 PROCEDURE — 99999 PR PBB SHADOW E&M-EST. PATIENT-LVL III: CPT | Mod: PBBFAC,,, | Performed by: COLON & RECTAL SURGERY

## 2025-04-17 PROCEDURE — 3288F FALL RISK ASSESSMENT DOCD: CPT | Mod: CPTII,S$GLB,, | Performed by: COLON & RECTAL SURGERY

## 2025-04-17 PROCEDURE — 1101F PT FALLS ASSESS-DOCD LE1/YR: CPT | Mod: CPTII,S$GLB,, | Performed by: COLON & RECTAL SURGERY

## 2025-04-17 PROCEDURE — 99213 OFFICE O/P EST LOW 20 MIN: CPT | Mod: S$GLB,,, | Performed by: COLON & RECTAL SURGERY

## 2025-04-17 PROCEDURE — 3008F BODY MASS INDEX DOCD: CPT | Mod: CPTII,S$GLB,, | Performed by: COLON & RECTAL SURGERY

## 2025-04-17 PROCEDURE — 3074F SYST BP LT 130 MM HG: CPT | Mod: CPTII,S$GLB,, | Performed by: COLON & RECTAL SURGERY

## 2025-04-17 PROCEDURE — 1159F MED LIST DOCD IN RCRD: CPT | Mod: CPTII,S$GLB,, | Performed by: COLON & RECTAL SURGERY

## 2025-04-17 PROCEDURE — 1126F AMNT PAIN NOTED NONE PRSNT: CPT | Mod: CPTII,S$GLB,, | Performed by: COLON & RECTAL SURGERY

## 2025-04-17 PROCEDURE — 3078F DIAST BP <80 MM HG: CPT | Mod: CPTII,S$GLB,, | Performed by: COLON & RECTAL SURGERY

## 2025-04-17 PROCEDURE — 4010F ACE/ARB THERAPY RXD/TAKEN: CPT | Mod: CPTII,S$GLB,, | Performed by: COLON & RECTAL SURGERY

## 2025-04-17 NOTE — PROGRESS NOTES
"   Patient ID: [id number]     Chief Complaint: Follow-up (6 month f/u)      HPI:     Julianne Parker is a 65 y.o. female s/p transanal excision of a T1a well-differentiated rectal neuroendocrine tumor here today for scheduled follow-up.  She has no complaints.  She denies rectal pain, bleeding, flushing, palpitations, and diarrhea.  She is scheduled for surveillance colonoscopy in July.    Current Outpatient Medications   Medication Instructions    amLODIPine (NORVASC) 10 mg, Oral, Daily    blood pressure monitor (BLOOD PRESSURE KIT) Kit Use daily to assess blood pressure    ceramides 1,3,6-II (CERAVE) Crea Apply as needed    HYDROcodone-acetaminophen (NORCO) 5-325 mg per tablet 1 tablet, Oral, Every 4 hours PRN    levothyroxine (SYNTHROID) 75 mcg, Oral, Before breakfast    losartan (COZAAR) 50 mg, Daily    meloxicam (MOBIC) 15 mg, Oral, Daily    vitamin D (VITAMIN D3) 2,000 Units, Daily       Patient is allergic to hydrochlorothiazide, hydrocortisone, lisinopril, sulfamethoxazole-trimethoprim, and hydrobenzthiazide.     Patient Care Team:  Poonam Appiah FNP as PCP - General (Family Medicine)       Subjective:     Review of Systems    12 point review of systems conducted, negative except as stated in the history of present illness. See HPI for details.      Objective:     Visit Vitals  /67 (BP Location: Left arm, Patient Position: Sitting)   Pulse (!) 48   Ht 5' 7" (1.702 m)   Wt 71.2 kg (157 lb)   BMI 24.59 kg/m²       Physical Exam    General: Alert and oriented, No acute distress.  Head: Normocephalic, Atraumatic.  Eye: Sclera non-icteric.  Respiratory: Non-labored respirations, Symmetrical chest wall expansion.  Cardiovascular: Regular rate.  Gastrointestinal: Soft, Non-tender, Non-distended, Normal bowel sounds.  Rectal: Not examined today  Integumentary: Warm, Dry, Intact, No rashes.  Extremities: No edema.  Neurologic: No focal deficits.      Labs Reviewed:     Chemistry:  Lab Results   Component " Value Date     12/12/2024    K 4.1 12/12/2024    BUN 10.6 12/12/2024    CREATININE 0.74 12/12/2024    EGFRNORACEVR >60 12/12/2024    GLUCOSE 77 (L) 12/12/2024    CALCIUM 9.5 12/12/2024    ALKPHOS 82 12/12/2024    LABPROT 7.5 12/12/2024    ALBUMIN 3.9 12/12/2024    BILIDIR 0.2 12/03/2021    IBILI 0.20 12/03/2021    AST 20 12/12/2024    ALT 13 12/12/2024    MG 2.30 05/26/2024        Hematology:  Lab Results   Component Value Date    WBC 2.82 (L) 12/12/2024    HGB 12.6 12/12/2024    HCT 37.9 12/12/2024     12/12/2024         Assessment:       ICD-10-CM ICD-9-CM   1. Benign neuroendocrine tumor of rectum  D3A.8 209.57        Plan:     Keep colonoscopy with Dr. Farrukh Mccall in July  RTC in August      No follow-ups on file. In addition to their scheduled follow up, the patient has also been instructed to follow up on as needed basis.     Future Appointments   Date Time Provider Department Center   4/28/2025  8:20 AM Poonam Appiah RE [facility] [place]   7/15/2025  8:50 AM Gillian Lundberg [facility] [place]    8/11/2025  8:00 AM Thom Martini NP [facility] UROLO [place]    8/21/2025  9:00 AM Aditya Espinal MD [facility] [id number] [facility]        Aditya Espinal MD

## 2025-04-28 ENCOUNTER — LAB VISIT (OUTPATIENT)
Dept: LAB | Facility: HOSPITAL | Age: 65
End: 2025-04-28
Attending: NURSE PRACTITIONER
Payer: MEDICARE

## 2025-04-28 ENCOUNTER — OFFICE VISIT (OUTPATIENT)
Dept: INTERNAL MEDICINE | Facility: CLINIC | Age: 65
End: 2025-04-28
Payer: MEDICARE

## 2025-04-28 VITALS
DIASTOLIC BLOOD PRESSURE: 64 MMHG | WEIGHT: 158 LBS | HEART RATE: 51 BPM | RESPIRATION RATE: 18 BRPM | BODY MASS INDEX: 24.8 KG/M2 | TEMPERATURE: 98 F | HEIGHT: 67 IN | SYSTOLIC BLOOD PRESSURE: 112 MMHG

## 2025-04-28 DIAGNOSIS — Z12.31 BREAST CANCER SCREENING BY MAMMOGRAM: ICD-10-CM

## 2025-04-28 DIAGNOSIS — E78.00 HIGH CHOLESTEROL: ICD-10-CM

## 2025-04-28 DIAGNOSIS — D3A.8 BENIGN NEUROENDOCRINE TUMOR OF RECTUM: Primary | ICD-10-CM

## 2025-04-28 DIAGNOSIS — R51.9 NONINTRACTABLE HEADACHE, UNSPECIFIED CHRONICITY PATTERN, UNSPECIFIED HEADACHE TYPE: ICD-10-CM

## 2025-04-28 DIAGNOSIS — R79.89 ABNORMAL CBC: ICD-10-CM

## 2025-04-28 DIAGNOSIS — R31.29 MICROSCOPIC HEMATURIA: ICD-10-CM

## 2025-04-28 DIAGNOSIS — E04.1 THYROID NODULE: ICD-10-CM

## 2025-04-28 DIAGNOSIS — E03.9 HYPOTHYROIDISM, UNSPECIFIED TYPE: ICD-10-CM

## 2025-04-28 DIAGNOSIS — I10 HYPERTENSION, UNSPECIFIED TYPE: ICD-10-CM

## 2025-04-28 DIAGNOSIS — R73.03 PREDIABETES: ICD-10-CM

## 2025-04-28 DIAGNOSIS — I10 PRIMARY HYPERTENSION: ICD-10-CM

## 2025-04-28 DIAGNOSIS — R00.1 BRADYCARDIA: ICD-10-CM

## 2025-04-28 DIAGNOSIS — Z00.00 WELL ADULT EXAM: ICD-10-CM

## 2025-04-28 DIAGNOSIS — C7A.026 MALIGNANT CARCINOID TUMOR OF THE RECTUM: ICD-10-CM

## 2025-04-28 DIAGNOSIS — E03.9 ACQUIRED HYPOTHYROIDISM: ICD-10-CM

## 2025-04-28 DIAGNOSIS — I10 PRIMARY HYPERTENSION: Primary | ICD-10-CM

## 2025-04-28 LAB
ALBUMIN SERPL-MCNC: 3.7 G/DL (ref 3.4–4.8)
ALBUMIN/GLOB SERPL: 1.1 RATIO (ref 1.1–2)
ALP SERPL-CCNC: 79 UNIT/L (ref 40–150)
ALT SERPL-CCNC: 12 UNIT/L (ref 0–55)
ANION GAP SERPL CALC-SCNC: 8 MEQ/L
AST SERPL-CCNC: 20 UNIT/L (ref 11–45)
BASOPHILS # BLD AUTO: 0.03 X10(3)/MCL
BASOPHILS NFR BLD AUTO: 0.8 %
BILIRUB SERPL-MCNC: 0.3 MG/DL
BUN SERPL-MCNC: 15.1 MG/DL (ref 9.8–20.1)
CALCIUM SERPL-MCNC: 8.8 MG/DL (ref 8.4–10.2)
CHLORIDE SERPL-SCNC: 112 MMOL/L (ref 98–107)
CHOLEST SERPL-MCNC: 202 MG/DL
CHOLEST/HDLC SERPL: 3 {RATIO} (ref 0–5)
CO2 SERPL-SCNC: 21 MMOL/L (ref 23–31)
CREAT SERPL-MCNC: 0.63 MG/DL (ref 0.55–1.02)
CREAT/UREA NIT SERPL: 24
EOSINOPHIL # BLD AUTO: 0.16 X10(3)/MCL (ref 0–0.9)
EOSINOPHIL NFR BLD AUTO: 4.4 %
ERYTHROCYTE [DISTWIDTH] IN BLOOD BY AUTOMATED COUNT: 13.7 % (ref 11.5–17)
GFR SERPLBLD CREATININE-BSD FMLA CKD-EPI: >60 ML/MIN/1.73/M2
GLOBULIN SER-MCNC: 3.5 GM/DL (ref 2.4–3.5)
GLUCOSE SERPL-MCNC: 81 MG/DL (ref 82–115)
HCT VFR BLD AUTO: 34.9 % (ref 37–47)
HDLC SERPL-MCNC: 64 MG/DL (ref 35–60)
HGB BLD-MCNC: 11.1 G/DL (ref 12–16)
IMM GRANULOCYTES # BLD AUTO: 0.01 X10(3)/MCL (ref 0–0.04)
IMM GRANULOCYTES NFR BLD AUTO: 0.3 %
LDLC SERPL CALC-MCNC: 129 MG/DL (ref 50–140)
LYMPHOCYTES # BLD AUTO: 1.3 X10(3)/MCL (ref 0.6–4.6)
LYMPHOCYTES NFR BLD AUTO: 35.4 %
MCH RBC QN AUTO: 28.8 PG (ref 27–31)
MCHC RBC AUTO-ENTMCNC: 31.8 G/DL (ref 33–36)
MCV RBC AUTO: 90.6 FL (ref 80–94)
MONOCYTES # BLD AUTO: 0.41 X10(3)/MCL (ref 0.1–1.3)
MONOCYTES NFR BLD AUTO: 11.2 %
NEUTROPHILS # BLD AUTO: 1.76 X10(3)/MCL (ref 2.1–9.2)
NEUTROPHILS NFR BLD AUTO: 47.9 %
NRBC BLD AUTO-RTO: 0 %
PLATELET # BLD AUTO: 228 X10(3)/MCL (ref 130–400)
PMV BLD AUTO: 9.2 FL (ref 7.4–10.4)
POTASSIUM SERPL-SCNC: 3.5 MMOL/L (ref 3.5–5.1)
PROT SERPL-MCNC: 7.2 GM/DL (ref 5.8–7.6)
RBC # BLD AUTO: 3.85 X10(6)/MCL (ref 4.2–5.4)
SODIUM SERPL-SCNC: 141 MMOL/L (ref 136–145)
TRIGL SERPL-MCNC: 43 MG/DL (ref 37–140)
VLDLC SERPL CALC-MCNC: 9 MG/DL
WBC # BLD AUTO: 3.67 X10(3)/MCL (ref 4.5–11.5)

## 2025-04-28 PROCEDURE — 1160F RVW MEDS BY RX/DR IN RCRD: CPT | Mod: CPTII,,, | Performed by: NURSE PRACTITIONER

## 2025-04-28 PROCEDURE — 99213 OFFICE O/P EST LOW 20 MIN: CPT | Mod: PBBFAC | Performed by: NURSE PRACTITIONER

## 2025-04-28 PROCEDURE — 99214 OFFICE O/P EST MOD 30 MIN: CPT | Mod: S$PBB,,, | Performed by: NURSE PRACTITIONER

## 2025-04-28 PROCEDURE — 4010F ACE/ARB THERAPY RXD/TAKEN: CPT | Mod: CPTII,,, | Performed by: NURSE PRACTITIONER

## 2025-04-28 PROCEDURE — 3008F BODY MASS INDEX DOCD: CPT | Mod: CPTII,,, | Performed by: NURSE PRACTITIONER

## 2025-04-28 PROCEDURE — 36415 COLL VENOUS BLD VENIPUNCTURE: CPT

## 2025-04-28 PROCEDURE — 3078F DIAST BP <80 MM HG: CPT | Mod: CPTII,,, | Performed by: NURSE PRACTITIONER

## 2025-04-28 PROCEDURE — 1159F MED LIST DOCD IN RCRD: CPT | Mod: CPTII,,, | Performed by: NURSE PRACTITIONER

## 2025-04-28 PROCEDURE — 3074F SYST BP LT 130 MM HG: CPT | Mod: CPTII,,, | Performed by: NURSE PRACTITIONER

## 2025-04-28 PROCEDURE — 80053 COMPREHEN METABOLIC PANEL: CPT

## 2025-04-28 PROCEDURE — 80061 LIPID PANEL: CPT

## 2025-04-28 PROCEDURE — 85025 COMPLETE CBC W/AUTO DIFF WBC: CPT

## 2025-04-28 RX ORDER — AMLODIPINE BESYLATE 10 MG/1
10 TABLET ORAL DAILY
Qty: 90 TABLET | Refills: 1 | Status: SHIPPED | OUTPATIENT
Start: 2025-04-28 | End: 2026-04-28

## 2025-04-28 RX ORDER — LEVOTHYROXINE SODIUM 75 UG/1
75 TABLET ORAL
Qty: 90 TABLET | Refills: 1 | Status: SHIPPED | OUTPATIENT
Start: 2025-04-28 | End: 2025-10-25

## 2025-04-28 NOTE — PROGRESS NOTES
Patient ID: [id number]     Chief Complaint: Results        HPI:     HPI      Julianne Parker is a 65 y.o. female here today for a follow up.         Immunizations:   Immunization History   Administered Date(s) Administered    COVID-19 MRNA, LN-S PF (MODERNA HALF 0.25 ML DOSE) 11/17/2021    COVID-19 Vaccine 03/11/2021, 04/08/2021    COVID-19, MRNA, LN-S, PF (MODERNA FULL 0.5 ML DOSE) 03/11/2021, 04/08/2021    Td (ADULT) 02/19/1992, 05/01/2007    Tdap 12/30/2020        -------------------------------------    Benign neuroendocrine tumor of rectum    Bradycardia    Essential (primary) hypertension    Hypothyroidism, unspecified    Menopause    Microscopic hematuria    Nontoxic single thyroid nodule    Sleep disorder not due to a substance or known physiological condition, unspecified        Past Surgical History:   Procedure Laterality Date    COLONOSCOPY      EXAMINATION UNDER ANESTHESIA N/A 10/2/2024    Procedure: Exam under anesthesia;  Surgeon: Aditya Espinal MD;  Location: [facility] OR;  Service: Colon and Rectal;  Laterality: N/A;  POSSIBLE TRANSANAL EXCISION    PLEASE HAVE SCOTTY STORZ EQUIPMENT, TAMIS EQUIPMENT AND PROCTOSCOPE AVAILABLE //  XX    KNEE ARTHROSCOPY W/ MENISCECTOMY Right 02/22/2024    Procedure: ARTHROSCOPY, KNEE, WITH MENISCECTOMY;  Surgeon: Leo Saucedo MD;  Location: [facility] OR;  Service: Orthopedics;  Laterality: Right;    NO PAST SURGERIES      TRANSANAL RECTAL RESECTION N/A 10/2/2024    Procedure: EXCISION, LESION, RECTUM, ANAL APPROACH;  Surgeon: Aditya Espinal MD;  Location: [facility] OR;  Service: Colon and Rectal;  Laterality: N/A;  transanal excision       Review of patient's allergies indicates:   Allergen Reactions    Hydrochlorothiazide      Other reaction(s): rash  Rash    Hydrocortisone Other (See Comments)    Lisinopril Edema    Sulfamethoxazole-trimethoprim Other (See Comments)    Hydrobenzthiazide Rash       Current Outpatient Medications   Medication Instructions     amLODIPine (NORVASC) 10 mg, Oral, Daily    blood pressure monitor (BLOOD PRESSURE KIT) Kit Use daily to assess blood pressure    ceramides 1,3,6-II (CERAVE) Crea Apply as needed    HYDROcodone-acetaminophen (NORCO) 5-325 mg per tablet 1 tablet, Oral, Every 4 hours PRN    levothyroxine (SYNTHROID) 75 mcg, Oral, Before breakfast    losartan (COZAAR) 50 mg, Daily    meloxicam (MOBIC) 15 mg, Oral, Daily    vitamin D (VITAMIN D3) 2,000 Units, Daily       Social History[1]     Family History   Problem Relation Name Age of Onset    Diabetes type II Mother      Hypertension Mother      Hypertension Father          Patient Care Team:  Poonam Appiah FNP as PCP - General (Family Medicine)     Subjective:     Review of Systems     See HPI for details    Constitutional: Denies Change in appetite. Denies Chills. Denies Fever. Denies Night sweats.  Eye: Denies Blurred vision. Denies Discharge. Denies Eye pain.  ENT: Denies Decreased hearing. Denies Sore throat. Denies Swollen glands.  Respiratory: Denies Cough. Denies Shortness of breath. Denies Shortness of breath with exertion. Denies Wheezing.  Cardiovascular: DeniesChest pain at rest. Denies Chest pain with exertion. Denies Irregular heartbeat. Denies Palpitations. Denies Edema.  Gastrointestinal: Denies Abdominal pain. DeniesDiarrhea. Denies Nausea. Denies Vomiting. Denies Hematemesis or Hematochezia.  Genitourinary: Denies Dysuria. Denies Urinary frequency. Denies Urinary urgency. Denies Blood in urine.  Endocrine: Denies Cold intolerance. Denies Excessive thirst. Denies Heat intolerance. Denies Weight loss. Denies Weight gain.  Musculoskeletal: Denies Painful joints. Denies Weakness.  Integumentary: Denies Rash. Denies Itching. Denies Dry skin.  Neurologic: Denies Dizziness. Denies Fainting. Denies Headache.  Psychiatric: Denies Depression. Denies Anxiety. Denies Suicidal/Homicidal ideations.    All Other ROS: Negative except as stated in HPI.       Objective:  "    Visit Vitals  /64 (BP Location: Right arm, Patient Position: Sitting)   Pulse (!) 51   Temp 97.8 °F (36.6 °C) (Oral)   Resp 18   Ht 5' 7" (1.702 m)   Wt 71.7 kg (158 lb)   BMI 24.75 kg/m²       Physical Exam    General: Alert and oriented, No acute distress.  Head: Normocephalic, Atraumatic.  Eye: Pupils are equal, round and reactive to light, Extraocular movements are intact, Sclera non-icteric.  Ears/Nose/Throat: Normal, Mucosa moist,Clear.  Neck/Thyroid: Supple, Non-tender, No carotid bruit, No lymphadenopathy, No JVD, Full range of motion.  Respiratory: Clear to auscultation bilaterally; No wheezes, rales or rhonchi,Non-labored respirations, Symmetrical chest wall expansion.  Cardiovascular: Regular rate and rhythm, S1/S2 normal, No murmurs, rubs or gallops.  Gastrointestinal: Soft, Non-tender, Non-distended, Normal bowel sounds, No palpable organomegaly.  Musculoskeletal: Normal range of motion.  Integumentary: Warm, Dry, Intact, No suspicious lesions or rashes.  Extremities: No clubbing, cyanosis or edema  Neurologic: No focal deficits, Cranial Nerves II-XII are grossly intact, Motor strength normal upper and lower extremities, Sensory exam intact.  Psychiatric: Normal interaction, Coherent speech, Euthymic mood, Appropriate affect       Labs Reviewed:     Chemistry:  Lab Results   Component Value Date     04/28/2025    K 3.5 04/28/2025    BUN 15.1 04/28/2025    CREATININE 0.63 04/28/2025    EGFRNORACEVR >60 04/28/2025    GLUCOSE 81 (L) 04/28/2025    CALCIUM 8.8 04/28/2025    ALKPHOS 79 04/28/2025    LABPROT 7.2 04/28/2025    ALBUMIN 3.7 04/28/2025    BILIDIR 0.2 12/03/2021    IBILI 0.20 12/03/2021    AST 20 04/28/2025    ALT 12 04/28/2025    MG 2.30 05/26/2024    MPJECCHV35DO 57.3 08/30/2023        Lab Results   Component Value Date    HGBA1C 5.6 03/14/2024        Hematology:  Lab Results   Component Value Date    WBC 3.67 (L) 04/28/2025    HGB 11.1 (L) 04/28/2025    HCT 34.9 (L) 04/28/2025    "  04/28/2025       Lipid Panel:  Lab Results   Component Value Date    CHOL 202 (H) 04/28/2025    HDL 64 (H) 04/28/2025    .00 04/28/2025    TRIG 43 04/28/2025    TOTALCHOLEST 3 04/28/2025        Urine:  Lab Results   Component Value Date    APPEARANCEUA Clear 09/17/2024    SGUA 1.016 09/17/2024    PROTEINUA Negative 09/17/2024    KETONESUA Negative 09/17/2024    LEUKOCYTESUR Negative 09/17/2024    RBCUA 0-5 09/17/2024    WBCUA 0-5 09/17/2024    BACTERIA None Seen 09/17/2024    SQEPUA Occ (A) 09/17/2024    HYALINECASTS None Seen 09/17/2024        Assessment:       ICD-10-CM ICD-9-CM   1. Benign neuroendocrine tumor of rectum  D3A.8 209.57   2. Primary hypertension  I10 401.9   3. Acquired hypothyroidism  E03.9 244.9   4. Bradycardia  R00.1 427.89   5. Microscopic hematuria  R31.29 599.72   6. High cholesterol  E78.00 272.0   7. Thyroid nodule  E04.1 241.0   8. Breast cancer screening by mammogram  Z12.31 V76.12   9. Well adult exam  Z00.00 V70.0   10. Prediabetes  R73.03 790.29   11. Malignant carcinoid tumor of the rectum  C7A.026 209.17   12. Hypertension, unspecified type  I10 401.9   13. Hypothyroidism, unspecified type  E03.9 244.9   14. Abnormal CBC  R79.89 790.6        Plan:     1. Benign neuroendocrine tumor of rectum (Primary)  Pt had colonoscopy 7-19-24 showing polyps X 2. Pathology of polyp showed Benign neuroendocrine tumor of rectum- recommend repeat proctoscopy in 6 months- due Jan 2025. Pt followed with Surg Onc for further management. Pt had colonoscopy done 7-19-24  showing Neuro endocrine tumor. Pf followed in Surg Onc. Keep appts.     2. Primary hypertension  Low fat low salt diet and exercise. Cont Amlodipine as prescribed.     3. Acquired hypothyroidism  TSH WNL. Cont Levothyroxine as prescribed.     4. Bradycardia  Pt followed [facility]. Keep appts.     5. Microscopic hematuria  Blood persists in urine. Pt followed in Uro- last seen 6-7-23 after cystoscope  and RTC in 6 months. Pt had appt scheduled 12-6-23 but NS. Referred back to Uro cl for further mgmt.     6. High cholesterol  FLP WNL. Low fat diet and exercise.     7. Thyroid nodule  Thyroid US done 10-31-22:  US Thyroid  Order: [id number]  Status: Final result       Visible to patient: No (inaccessible in Patient Portal)       Next appt: 04/10/2024 at 09:00 AM in Orthopedics (Leo Saucedo MD)       Dx: Thyroid nodule    0 Result Notes       1 Follow-up Encounter  Details     Reading Physician Reading Date Result Priority   Shola Waldron MD  [phone] 10/31/2022 Routine      Narrative & Impression  EXAMINATION:  US THYROID     CLINICAL HISTORY:  , Nontoxic single thyroid nodule.     TECHNIQUE:  Multiple transverse and sagittal grayscale sonographic images were acquired through the thyroid gland.     FINDINGS:  Right hemithyroid measures 3.4 x 1.4 x 1.6, left joel thyroid measures 3.5 x 1.4 x 1.2 isthmus measures 3.1 mm in thickness.     Within the right joel thyroid there is a 7 mm x 6 mm x 7 mm subcentimeter nodule and within the left joel thyroid there is a 2 mm x 1 mm x 2 mm subcentimeter nodule none of these nodules meets criteria for biopsy and or surveillance.     No other focal abnormalities identified     Impression:     Subcentimeter nodules in the right and left joel thyroid as above        Electronically signed by: Shola Waldron  Date:                                            10/31/2022  Time:                                           14:18             Exam Ended: 10/31/22 10:45 CDT Last Resulted: 10/31/22 14:18 CDT            Sub-centimeter nodules not recommended for surveillance or biopsy per radiology recommendation.    8. Breast cancer screening by mammogram  UTD MMG.     9. Well adult exam  Labs done today. UTD MMG. Keep GYN cl appt. Colonoscopy done 7-19-24 showing neuroendocrine tumor- pt followed by surg onc. Keep appts. RTC in 4 months with labs 1 week prior to appt.     10.  Prediabetes  A1c 5.6. ADA diet and exercise.         Follow up in about 4 months (around 8/28/2025) for with labs 1 week prior to appt. . In addition to their scheduled follow up, the patient has also been instructed to follow up on as needed basis.     Future Appointments   Date Time Provider Department Center   5/5/2025  9:30 AM Leo Saucedo MD [facility] MOBORT [place]   7/15/2025  8:50 AM Gillian Lundberg FNP [facility] GYN [place]   8/11/2025  8:00 AM Thom Martini NP [facility] UROLO [place]    8/21/2025  9:00 AM [facility]        BERTHA Cooper             [1]   Social History  Socioeconomic History    Marital status:      Spouse name: Manas    Number of children: 2   Tobacco Use    Smoking status: Never     Passive exposure: Current    Smokeless tobacco: Never   Substance and Sexual Activity    Alcohol use: Never    Drug use: Never    Sexual activity: Yes     Partners: Male   Social History Narrative    ** Merged History Encounter **          Social Drivers of Health     Financial Resource Strain: Low Risk  (12/19/2024)    Overall Financial Resource Strain (CARDIA)     Difficulty of Paying Living Expenses: Not hard at all   Food Insecurity: No Food Insecurity (9/18/2024)    Hunger Vital Sign     Worried About Running Out of Food in the Last Year: Never true     Ran Out of Food in the Last Year: Never true   Transportation Needs: No Transportation Needs (9/18/2024)    TRANSPORTATION NEEDS     Transportation : No   Physical Activity: Insufficiently Active (9/18/2024)    Exercise Vital Sign     Days of Exercise per Week: 4 days     Minutes of Exercise per Session: 30 min   Stress: No Stress Concern Present (9/18/2024)    [place] [employer] - Occupational Stress Questionnaire     Feeling of Stress : Not at all   Housing Stability: Unknown (9/18/2024)    Housing Stability Vital Sign     Unable to Pay for Housing in the Last Year: No      Homeless in the Last Year: No

## 2025-05-05 ENCOUNTER — OFFICE VISIT (OUTPATIENT)
Dept: ORTHOPEDICS | Facility: CLINIC | Age: 65
End: 2025-05-05
Payer: MEDICARE

## 2025-05-05 VITALS
HEIGHT: 67 IN | DIASTOLIC BLOOD PRESSURE: 68 MMHG | HEART RATE: 53 BPM | SYSTOLIC BLOOD PRESSURE: 112 MMHG | WEIGHT: 156 LBS | BODY MASS INDEX: 24.48 KG/M2

## 2025-05-05 DIAGNOSIS — M17.11 PRIMARY OSTEOARTHRITIS OF RIGHT KNEE: Primary | ICD-10-CM

## 2025-05-05 PROCEDURE — 3074F SYST BP LT 130 MM HG: CPT | Mod: CPTII,,, | Performed by: REHABILITATION UNIT

## 2025-05-05 PROCEDURE — 4010F ACE/ARB THERAPY RXD/TAKEN: CPT | Mod: CPTII,,, | Performed by: REHABILITATION UNIT

## 2025-05-05 PROCEDURE — 3078F DIAST BP <80 MM HG: CPT | Mod: CPTII,,, | Performed by: REHABILITATION UNIT

## 2025-05-05 PROCEDURE — 1159F MED LIST DOCD IN RCRD: CPT | Mod: CPTII,,, | Performed by: REHABILITATION UNIT

## 2025-05-05 PROCEDURE — 3008F BODY MASS INDEX DOCD: CPT | Mod: CPTII,,, | Performed by: REHABILITATION UNIT

## 2025-05-05 PROCEDURE — 1125F AMNT PAIN NOTED PAIN PRSNT: CPT | Mod: CPTII,,, | Performed by: REHABILITATION UNIT

## 2025-05-05 PROCEDURE — 99213 OFFICE O/P EST LOW 20 MIN: CPT | Mod: 25,,, | Performed by: REHABILITATION UNIT

## 2025-05-05 RX ORDER — BETAMETHASONE SODIUM PHOSPHATE AND BETAMETHASONE ACETATE 3; 3 MG/ML; MG/ML
12 INJECTION, SUSPENSION INTRA-ARTICULAR; INTRALESIONAL; INTRAMUSCULAR; SOFT TISSUE
Status: DISCONTINUED | OUTPATIENT
Start: 2025-05-05 | End: 2025-05-05 | Stop reason: HOSPADM

## 2025-05-05 RX ORDER — LIDOCAINE HYDROCHLORIDE 10 MG/ML
2 INJECTION, SOLUTION INFILTRATION; PERINEURAL
Status: DISCONTINUED | OUTPATIENT
Start: 2025-05-05 | End: 2025-05-05 | Stop reason: HOSPADM

## 2025-05-05 RX ADMIN — LIDOCAINE HYDROCHLORIDE 2 ML: 10 INJECTION, SOLUTION INFILTRATION; PERINEURAL at 09:05

## 2025-05-05 RX ADMIN — BETAMETHASONE SODIUM PHOSPHATE AND BETAMETHASONE ACETATE 12 MG: 3; 3 INJECTION, SUSPENSION INTRA-ARTICULAR; INTRALESIONAL; INTRAMUSCULAR; SOFT TISSUE at 09:05

## 2025-05-05 NOTE — PROGRESS NOTES
Subjective:      Patient ID: Julianne Parker is a 65 y.o. female.    Chief Complaint: Pain of the Right Knee (Rt knee pain and swelling patient states it just stated hurting again a few wks ago it has good and bad days. She does ice and elevate and taking some mobic with relief.  )    Date of procedure: 2/22/24     Procedure:  Right knee arthroscopy and partial Medial and Lateral meniscectomies       Julianne Parker returns to the clinic for evaluation of her right knee.  She reports around 1 month history of worsening right knee pain.  Pain is anterior and diffusely about the knee.  She has some associated swelling.  Ambulating without device. No sensory or motor changes distally.  No mechanical symptoms and no instability.      Comprehensive review of systems completed and negative except as per HPI.  Objective:     Vitals:    05/05/25 0917   BP: 112/68   Pulse: (!) 53       Gen: No acute distress    Right knee:  Portal sites healed without signs of infection or dehiscence.  Mild effusion  Range of motion 0 to 125 degrees. Able to perform straight leg raise    Crepitus with flexion and extension localized to the patellofemoral compartment.  Positive patellar grind.  Negative Surinder's  Well-perfused lower extremity with capillary refill less than 2 seconds  Sensation intact to light touch to tibial nerve, superficial and deep peroneal distributions.  5 out of 5 EHL/FHL, tibials anterior, and gastrocsoleus complex  Calf soft and easily compressible without clinical sign of DVT. No palpable popliteal lymphadenopathy.  No pain out of proportion to expectation. No excessive pain with passive stretch of muscles in any compartment. Temperature and color of extremity appropriate. Brisk capillary refill of digits. Compartments compressible without evidence of excessive firmness.     Imaging:  Four view weight bearing radiographs of the right knee previously obtained show no acute fractures or dislocations.  Degenerative changes throughout joint spaces. No gross malalignment.      Assessment:       Encounter Diagnosis   Name Primary?    Primary osteoarthritis of right knee Yes         Plan:       Julianne was seen today for pain.    Diagnoses and all orders for this visit:    Primary osteoarthritis of right knee        Status post the above-stated procedure with a acute exacerbation of her knee pain  We discussed her options.  She elected to proceed with injection.  This was provided as above and she tolerated well.  Post-injection care was discussed.  Avoid aggravating activities.  Symptomatic management.  Rest, ice, compression, and elevation encouraged.  Anti-inflammatories.  Follow up with me as needed.  All of the patient's questions and concerns were addressed during the visit. Patient will call or contact our office with any new issues or concerns.    Follow-up: Julianne Parker to follow up as needed

## 2025-05-05 NOTE — PROCEDURES
Large Joint Aspiration/Injection: R knee    Date/Time: 5/5/2025 9:30 AM    Performed by: Yaneth Feliciano PA-C  Authorized by: Leo Saucedo MD    Consent Done?:  Yes (Verbal)  Indications:  Pain  Local anesthesia used?: No      Details:  Needle Size:  22 G  Ultrasonic Guidance for needle placement?: No    Approach:  Anterolateral  Location:  Knee  Site:  R knee  Medications:  2 mL LIDOcaine HCL 10 mg/ml (1%) 10 mg/mL (1 %); 12 mg betamethasone acetate-betamethasone sodium phosphate 6 mg/mL

## (undated) DEVICE — GLOVE PROTEXIS LTX MICRO  7.5

## (undated) DEVICE — CLOSURE SKIN STERI STRIP 1/2X4

## (undated) DEVICE — KIT SURGICAL TURNOVER

## (undated) DEVICE — ELECTRODE UTAHLOOP EXT 10CM

## (undated) DEVICE — GLOVE SENSICARE PI GRN 7

## (undated) DEVICE — SOL IRR NACL .9% 3000ML

## (undated) DEVICE — APPLICATOR CHLORAPREP ORN 26ML

## (undated) DEVICE — SUT VICRYL CTD 2-0 GI 27 SH

## (undated) DEVICE — GLOVE SENSICARE PI ORTHO LT 7

## (undated) DEVICE — Device

## (undated) DEVICE — DRAPE FULL SHEET 70X100IN

## (undated) DEVICE — GLOVE PROTEXIS BLUE LATEX 7

## (undated) DEVICE — TRAY SKIN SCRUB WET PREMIUM

## (undated) DEVICE — FOAM DEVON WELL LEG HOLDER

## (undated) DEVICE — TUBE SET INFLOW/OUTFLOW

## (undated) DEVICE — ELECTRODE PATIENT RETURN DISP

## (undated) DEVICE — GLOVE SENSICARE PI GRN 6.5

## (undated) DEVICE — SUPPORT ULNA NERVE PROTECTOR

## (undated) DEVICE — CUFF ATS 2 PORT SNGL BLDR 34IN

## (undated) DEVICE — SUT MONOCRYL 3-0 PS-2 UND

## (undated) DEVICE — GLOVE SENSICARE PI SURG 6.5

## (undated) DEVICE — COVER FULLGUARD SHOE HIGH-TOP

## (undated) DEVICE — NDL SAFETY 22G X 1.5 ECLIPSE

## (undated) DEVICE — BLADE SHAVER LANZA 4.2X13CM

## (undated) DEVICE — SOL NACL IRR 3000ML

## (undated) DEVICE — GLOVE PROTEXIS LTX MICRO 6.5

## (undated) DEVICE — CONTAINER SPECIMEN SCREW 4OZ

## (undated) DEVICE — DRAPE STERI U-SHAPED 47X51IN

## (undated) DEVICE — SOL BETADINE 5%

## (undated) DEVICE — PACK SURGICAL KNEE SCOPE

## (undated) DEVICE — SET 24K OUTFLOW TUBE SHV SUC

## (undated) DEVICE — PAD PREP CUFFED NS 24X48IN

## (undated) DEVICE — GLOVE PROTEXIS BLUE LATEX 8

## (undated) DEVICE — POSITIONER HEAD ADULT

## (undated) DEVICE — PROTECTANT BENZOIN SPRAY ON 4.